# Patient Record
Sex: MALE | Race: WHITE | NOT HISPANIC OR LATINO | Employment: OTHER | URBAN - METROPOLITAN AREA
[De-identification: names, ages, dates, MRNs, and addresses within clinical notes are randomized per-mention and may not be internally consistent; named-entity substitution may affect disease eponyms.]

---

## 2017-05-19 ENCOUNTER — CONVERSION ENCOUNTER (OUTPATIENT)
Dept: CARDIOLOGY | Facility: CLINIC | Age: 62
End: 2017-05-19

## 2018-05-18 ENCOUNTER — CONVERSION ENCOUNTER (OUTPATIENT)
Dept: CARDIOLOGY | Facility: CLINIC | Age: 63
End: 2018-05-18

## 2018-11-12 ENCOUNTER — CONVERSION ENCOUNTER (OUTPATIENT)
Dept: CARDIOLOGY | Facility: CLINIC | Age: 63
End: 2018-11-12

## 2018-11-13 ENCOUNTER — HOSPITAL ENCOUNTER (OUTPATIENT)
Dept: PREADMISSION TESTING | Facility: HOSPITAL | Age: 63
Discharge: HOME | End: 2018-11-13
Attending: INTERNAL MEDICINE | Admitting: INTERNAL MEDICINE

## 2018-11-13 LAB
ANION GAP SERPL CALC-SCNC: 12.9 MMOL/L (ref 10–20)
APTT BLD: 24.3 SEC (ref 24–31)
BASOPHILS # BLD AUTO: 0 10*3/UL (ref 0–0.2)
BASOPHILS NFR BLD AUTO: 1 % (ref 0–2)
BUN SERPL-MCNC: 19 MG/DL (ref 8–20)
BUN/CREAT SERPL: 21.1 (ref 6.2–20.3)
CALCIUM SERPL-MCNC: 9 MG/DL (ref 8.9–10.3)
CHLORIDE SERPL-SCNC: 99 MMOL/L (ref 101–111)
CONV CO2: 31 MMOL/L (ref 22–32)
CREAT UR-MCNC: 0.9 MG/DL (ref 0.7–1.2)
DIFFERENTIAL METHOD BLD: (no result)
EOSINOPHIL # BLD AUTO: 0.2 10*3/UL (ref 0–0.3)
EOSINOPHIL # BLD AUTO: 3 % (ref 0–3)
ERYTHROCYTE [DISTWIDTH] IN BLOOD BY AUTOMATED COUNT: 13.6 % (ref 11.5–14.5)
GLUCOSE SERPL-MCNC: 220 MG/DL (ref 65–99)
HCT VFR BLD AUTO: 37.8 % (ref 40–54)
HGB BLD-MCNC: 13.3 G/DL (ref 14–18)
INR PPP: 1
LYMPHOCYTES # BLD AUTO: 1.2 10*3/UL (ref 0.8–4.8)
LYMPHOCYTES NFR BLD AUTO: 18 % (ref 18–42)
MCH RBC QN AUTO: 29.8 PG (ref 26–32)
MCHC RBC AUTO-ENTMCNC: 35.3 G/DL (ref 32–36)
MCV RBC AUTO: 84.5 FL (ref 80–94)
MONOCYTES # BLD AUTO: 0.4 10*3/UL (ref 0.1–1.3)
MONOCYTES NFR BLD AUTO: 7 % (ref 2–11)
NEUTROPHILS # BLD AUTO: 4.8 10*3/UL (ref 2.3–8.6)
NEUTROPHILS NFR BLD AUTO: 71 % (ref 50–75)
NRBC BLD AUTO-RTO: 0 /100{WBCS}
NRBC/RBC NFR BLD MANUAL: 0 10*3/UL
PLATELET # BLD AUTO: 248 10*3/UL (ref 150–450)
PMV BLD AUTO: 8 FL (ref 7.4–10.4)
POTASSIUM SERPL-SCNC: 3.9 MMOL/L (ref 3.6–5.1)
PROTHROMBIN TIME: 10.2 SEC (ref 9.6–11.7)
RBC # BLD AUTO: 4.48 10*6/UL (ref 4.6–6)
SODIUM SERPL-SCNC: 139 MMOL/L (ref 136–144)
WBC # BLD AUTO: 6.7 10*3/UL (ref 4.5–11.5)

## 2018-11-21 ENCOUNTER — HOSPITAL ENCOUNTER (OUTPATIENT)
Dept: LAB | Facility: HOSPITAL | Age: 63
Discharge: HOME | End: 2018-11-21
Attending: NURSE PRACTITIONER | Admitting: NURSE PRACTITIONER

## 2018-11-21 LAB
ANION GAP SERPL CALC-SCNC: 11.2 MMOL/L (ref 10–20)
BUN SERPL-MCNC: 15 MG/DL (ref 8–20)
BUN/CREAT SERPL: 16.7 (ref 6.2–20.3)
CALCIUM SERPL-MCNC: 9.4 MG/DL (ref 8.9–10.3)
CHLORIDE SERPL-SCNC: 100 MMOL/L (ref 101–111)
CONV CO2: 29 MMOL/L (ref 22–32)
CREAT UR-MCNC: 0.9 MG/DL (ref 0.7–1.2)
GLUCOSE SERPL-MCNC: 202 MG/DL (ref 65–99)
POTASSIUM SERPL-SCNC: 4.2 MMOL/L (ref 3.6–5.1)
SODIUM SERPL-SCNC: 136 MMOL/L (ref 136–144)

## 2018-11-27 ENCOUNTER — CONVERSION ENCOUNTER (OUTPATIENT)
Dept: CARDIOLOGY | Facility: CLINIC | Age: 63
End: 2018-11-27

## 2019-06-04 VITALS
BODY MASS INDEX: 39.68 KG/M2 | WEIGHT: 258 LBS | SYSTOLIC BLOOD PRESSURE: 132 MMHG | WEIGHT: 257 LBS | DIASTOLIC BLOOD PRESSURE: 74 MMHG | SYSTOLIC BLOOD PRESSURE: 118 MMHG | WEIGHT: 261 LBS | HEART RATE: 71 BPM | DIASTOLIC BLOOD PRESSURE: 72 MMHG | BODY MASS INDEX: 38.77 KG/M2 | WEIGHT: 255 LBS | HEART RATE: 68 BPM | BODY MASS INDEX: 39.23 KG/M2 | SYSTOLIC BLOOD PRESSURE: 142 MMHG | BODY MASS INDEX: 39.08 KG/M2 | DIASTOLIC BLOOD PRESSURE: 76 MMHG | HEART RATE: 67 BPM | DIASTOLIC BLOOD PRESSURE: 80 MMHG | SYSTOLIC BLOOD PRESSURE: 142 MMHG

## 2019-11-19 PROBLEM — I10 HYPERTENSION: Status: ACTIVE | Noted: 2019-11-19

## 2019-11-19 PROBLEM — E11.9 TYPE 2 DIABETES MELLITUS WITHOUT COMPLICATIONS: Status: ACTIVE | Noted: 2019-11-19

## 2019-11-19 PROBLEM — E78.5 HYPERLIPIDEMIA: Status: ACTIVE | Noted: 2019-11-19

## 2019-11-19 PROBLEM — E78.2 MIXED HYPERLIPIDEMIA: Status: ACTIVE | Noted: 2019-11-19

## 2021-04-30 ENCOUNTER — OFFICE VISIT (OUTPATIENT)
Dept: CARDIOLOGY | Facility: CLINIC | Age: 66
End: 2021-04-30

## 2021-04-30 VITALS
DIASTOLIC BLOOD PRESSURE: 90 MMHG | OXYGEN SATURATION: 98 % | WEIGHT: 255 LBS | BODY MASS INDEX: 38.77 KG/M2 | SYSTOLIC BLOOD PRESSURE: 148 MMHG | HEART RATE: 74 BPM

## 2021-04-30 DIAGNOSIS — I10 ESSENTIAL HYPERTENSION: ICD-10-CM

## 2021-04-30 DIAGNOSIS — I25.10 CORONARY ARTERY DISEASE INVOLVING NATIVE CORONARY ARTERY OF NATIVE HEART WITHOUT ANGINA PECTORIS: ICD-10-CM

## 2021-04-30 DIAGNOSIS — Z01.818 PRE-OPERATIVE CLEARANCE: Primary | ICD-10-CM

## 2021-04-30 PROCEDURE — 99204 OFFICE O/P NEW MOD 45 MIN: CPT | Performed by: INTERNAL MEDICINE

## 2021-04-30 PROCEDURE — 93000 ELECTROCARDIOGRAM COMPLETE: CPT | Performed by: INTERNAL MEDICINE

## 2021-04-30 RX ORDER — METFORMIN HYDROCHLORIDE 500 MG/1
500 TABLET, EXTENDED RELEASE ORAL DAILY
COMMUNITY
Start: 2021-02-19 | End: 2023-03-27 | Stop reason: ALTCHOICE

## 2021-04-30 RX ORDER — METOPROLOL SUCCINATE 50 MG/1
50 TABLET, EXTENDED RELEASE ORAL DAILY
COMMUNITY
Start: 2021-02-21 | End: 2021-04-30 | Stop reason: ALTCHOICE

## 2021-04-30 RX ORDER — TERAZOSIN 5 MG/1
5 CAPSULE ORAL
COMMUNITY
Start: 2021-03-17 | End: 2023-03-27

## 2021-06-28 RX ORDER — METOPROLOL SUCCINATE 50 MG/1
50 TABLET, EXTENDED RELEASE ORAL DAILY
COMMUNITY
Start: 2021-05-18 | End: 2023-03-27

## 2021-06-28 RX ORDER — NITROGLYCERIN 0.4 MG/1
TABLET SUBLINGUAL
Qty: 25 TABLET | Refills: 11 | Status: SHIPPED | OUTPATIENT
Start: 2021-06-28

## 2021-06-30 ENCOUNTER — TELEPHONE (OUTPATIENT)
Dept: CARDIOLOGY | Facility: CLINIC | Age: 66
End: 2021-06-30

## 2021-06-30 NOTE — TELEPHONE ENCOUNTER
Patient called and stated he was going to postpone having his stress myoview done because of the things that were going on with his wife. I made sure to let him know based on the symptoms that he was having that it was important that he have this test done. The patient was insistent on waiting. Patient made aware of the risks.

## 2021-09-13 ENCOUNTER — HOSPITAL ENCOUNTER (OUTPATIENT)
Dept: NUCLEAR MEDICINE | Facility: HOSPITAL | Age: 66
Discharge: HOME | End: 2021-09-13

## 2021-09-13 DIAGNOSIS — I25.10 CORONARY ARTERY DISEASE INVOLVING NATIVE CORONARY ARTERY OF NATIVE HEART WITHOUT ANGINA PECTORIS: ICD-10-CM

## 2021-09-13 DIAGNOSIS — Z01.818 PRE-OPERATIVE CLEARANCE: ICD-10-CM

## 2021-09-13 DIAGNOSIS — I10 ESSENTIAL HYPERTENSION: ICD-10-CM

## 2021-09-13 LAB
BH CV NUCLEAR PRIOR STUDY: 3
BH CV REST NUCLEAR ISOTOPE DOSE: 7.8 MCI
BH CV STRESS BP STAGE 1: NORMAL
BH CV STRESS BP STAGE 2: NORMAL
BH CV STRESS BP STAGE 3: NORMAL
BH CV STRESS BP STAGE 4: NORMAL
BH CV STRESS COMMENTS STAGE 1: NORMAL
BH CV STRESS COMMENTS STAGE 2: NORMAL
BH CV STRESS DOSE REGADENOSON STAGE 1: 0.4
BH CV STRESS DURATION MIN STAGE 1: 0
BH CV STRESS DURATION MIN STAGE 2: 4
BH CV STRESS DURATION SEC STAGE 1: 10
BH CV STRESS DURATION SEC STAGE 2: 0
BH CV STRESS HR STAGE 1: 96
BH CV STRESS HR STAGE 2: 91
BH CV STRESS HR STAGE 3: 86
BH CV STRESS HR STAGE 4: 84
BH CV STRESS NUCLEAR ISOTOPE DOSE: 21.5 MCI
BH CV STRESS PROTOCOL 1: NORMAL
BH CV STRESS RECOVERY BP: NORMAL MMHG
BH CV STRESS RECOVERY HR: 78 BPM
BH CV STRESS STAGE 1: 1
BH CV STRESS STAGE 2: 2
BH CV STRESS STAGE 3: 3
BH CV STRESS STAGE 4: 4
LV EF NUC BP: 62 %
MAXIMAL PREDICTED HEART RATE: 153 BPM
PERCENT MAX PREDICTED HR: 54.9 %
STRESS BASELINE BP: NORMAL MMHG
STRESS BASELINE HR: 69 BPM
STRESS PERCENT HR: 65 %
STRESS POST PEAK BP: NORMAL MMHG
STRESS POST PEAK HR: 84 BPM
STRESS TARGET HR: 130 BPM

## 2021-09-13 PROCEDURE — 93017 CV STRESS TEST TRACING ONLY: CPT

## 2021-09-13 PROCEDURE — A9502 TC99M TETROFOSMIN: HCPCS | Performed by: INTERNAL MEDICINE

## 2021-09-13 PROCEDURE — 78452 HT MUSCLE IMAGE SPECT MULT: CPT | Performed by: INTERNAL MEDICINE

## 2021-09-13 PROCEDURE — 78452 HT MUSCLE IMAGE SPECT MULT: CPT

## 2021-09-13 PROCEDURE — 0 TECHNETIUM TETROFOSMIN KIT: Performed by: INTERNAL MEDICINE

## 2021-09-13 PROCEDURE — 25010000002 REGADENOSON 0.4 MG/5ML SOLUTION: Performed by: INTERNAL MEDICINE

## 2021-09-13 PROCEDURE — 93016 CV STRESS TEST SUPVJ ONLY: CPT | Performed by: NURSE PRACTITIONER

## 2021-09-13 PROCEDURE — 93018 CV STRESS TEST I&R ONLY: CPT | Performed by: INTERNAL MEDICINE

## 2021-09-13 RX ADMIN — TETROFOSMIN 1 DOSE: 1.38 INJECTION, POWDER, LYOPHILIZED, FOR SOLUTION INTRAVENOUS at 10:39

## 2021-09-13 RX ADMIN — REGADENOSON 0.4 MG: 0.08 INJECTION, SOLUTION INTRAVENOUS at 10:39

## 2021-09-13 RX ADMIN — TETROFOSMIN 1 DOSE: 1.38 INJECTION, POWDER, LYOPHILIZED, FOR SOLUTION INTRAVENOUS at 08:45

## 2022-03-10 ENCOUNTER — OFFICE VISIT (OUTPATIENT)
Dept: CARDIOLOGY | Facility: CLINIC | Age: 67
End: 2022-03-10

## 2022-03-10 VITALS
OXYGEN SATURATION: 97 % | DIASTOLIC BLOOD PRESSURE: 79 MMHG | WEIGHT: 253 LBS | SYSTOLIC BLOOD PRESSURE: 146 MMHG | BODY MASS INDEX: 38.34 KG/M2 | HEIGHT: 68 IN | HEART RATE: 82 BPM

## 2022-03-10 DIAGNOSIS — I10 ESSENTIAL HYPERTENSION: ICD-10-CM

## 2022-03-10 DIAGNOSIS — I25.10 CORONARY ARTERY DISEASE INVOLVING NATIVE CORONARY ARTERY OF NATIVE HEART WITHOUT ANGINA PECTORIS: ICD-10-CM

## 2022-03-10 DIAGNOSIS — E78.2 MIXED HYPERLIPIDEMIA: ICD-10-CM

## 2022-03-10 DIAGNOSIS — Z01.818 PRE-OPERATIVE CLEARANCE: ICD-10-CM

## 2022-03-10 DIAGNOSIS — J38.1 VOCAL CORD POLYPS: Primary | ICD-10-CM

## 2022-03-10 PROCEDURE — 93000 ELECTROCARDIOGRAM COMPLETE: CPT | Performed by: NURSE PRACTITIONER

## 2022-03-10 PROCEDURE — 99214 OFFICE O/P EST MOD 30 MIN: CPT | Performed by: NURSE PRACTITIONER

## 2022-03-13 PROBLEM — Z01.810 PREOP CARDIOVASCULAR EXAM: Status: ACTIVE | Noted: 2022-03-13

## 2022-03-13 NOTE — PROGRESS NOTES
Cardiology Office Follow Up Visit      Primary Care Provider:  Ta Islas MD    Reason for f/u:     Preoperative cardiovascular risk assessment requested  Vocal polyps  CAD  Hypertension  Dyslipidemia      Subjective     CC:    Denies chest pain or dyspnea    History of Present Illness       Codey Rhodes is a 67 y.o. male.  Patient is here today requesting preoperative cardiovascular risk assessment prior to upcoming excision of vocal cord nodules planned by Dr. Durand.    The patient is known to have coronary artery disease with previous PCI and 2006, 2016 and 2018.  Additional past medical history is significant for hypertension, dyslipidemia and diabetes.    In September 2020 when the patient underwent a stress Myoview which showed no reversible ischemia.    At this time he denies any current chest pain or shortness of breath.  He denies any change in his functional activity status.  He has had no PND, orthopnea, palpitations, near syncope, lower extremity edema or feelings of his heart racing.  He reports compliance with medical therapy.          ASSESSMENT/PLAN:      Diagnoses and all orders for this visit:    1. Vocal cord polyps (Primary)    2. Pre-operative clearance  Comments:  Preoperative cardiovascular risk assessment requested prior to removal of vocal polyps    3. Essential hypertension  Comments:  Stable on current medical therapy    4. Mixed hyperlipidemia  Comments:  Treated with statin therapy    5. Coronary artery disease involving native coronary artery of native heart without angina pectoris  Comments:  And O'sNo signs or symptoms to suggest unstable angina.  Previous PCI 6, 2016 and 2018.  Negative stress Myoview September 2021            MEDICAL DECISION MAKING:      Patient is seeking preoperative cardiovascular risk assessment prior to upper upcoming vocal cord surgery.  The patient does not have any current signs or symptoms to suggest unstable angina, recent MI or heart failure.   EKG shows no acute ST or T wave segment abnormalities.  The patient has had a noninvasive ischemic evaluation within the last year that showed no reversible ischemia.    At this time the patient is an acceptable risk to proceed with upcoming surgery as planned.  He will follow up in the future with Dr. Cruz in our [place] clinic for convenience.  If he develops any new or worsening problems of asked him to contact the office sooner      Past Medical History:   Diagnosis Date   • Coronary artery disease involving native coronary artery of native heart 2016    PCI  PCI RCA 16 PCI to LAD 2018   • Hypertension    • Mixed hyperlipidemia 2019   • Type 2 diabetes mellitus without complications (HCC) 2019       Past Surgical History:   Procedure Laterality Date   • CARDIAC CATHETERIZATION     • CORONARY STENT PLACEMENT           Current Outpatient Medications:   •  amLODIPine (NORVASC) 10 MG tablet, Daily., Disp: , Rfl:   •  aspirin 325 MG tablet, Daily., Disp: , Rfl:   •  chlorthalidone (HYGROTON) 25 MG tablet, Take 12.5 mg by mouth Daily., Disp: , Rfl:   •  losartan (COZAAR) 100 MG tablet, LOSARTAN POTASSIUM 100 MG TABS, Disp: , Rfl:   •  metFORMIN ER (GLUCOPHAGE-XR) 500 MG 24 hr tablet, Take 500 mg by mouth Daily., Disp: , Rfl:   •  metoprolol succinate XL (TOPROL-XL) 50 MG 24 hr tablet, Take 50 mg by mouth Daily., Disp: , Rfl:   •  nitroglycerin (NITROSTAT) 0.4 MG SL tablet, 1 under the tongue as needed for angina, may repeat q5mins for up three doses, Disp: 25 tablet, Rfl: 11  •  pravastatin (PRAVACHOL) 40 MG tablet, Daily., Disp: , Rfl:   •  terazosin (HYTRIN) 5 MG capsule, Take 5 mg by mouth every night at bedtime., Disp: , Rfl:     Social History     Socioeconomic History   • Marital status:    Tobacco Use   • Smoking status: Former Smoker     Quit date:      Years since quittin.2   • Smokeless tobacco: Never Used   Vaping Use   • Vaping Use: Never used   Substance  "and Sexual Activity   • Alcohol use: Not Currently   • Drug use: Never   • Sexual activity: Defer       Family History   Problem Relation Age of Onset   • Heart disease Mother    • COPD Mother    • Cancer Mother    • No Known Problems Father        The following portions of the patient's history were reviewed and updated as appropriate: allergies, current medications, past family history, past medical history, past social history, past surgical history and problem list.    Review of Systems   Constitutional: Negative for decreased appetite and diaphoresis.   HENT: Positive for hoarse voice. Negative for congestion, hearing loss and nosebleeds.    Cardiovascular: Negative for chest pain, claudication, dyspnea on exertion, irregular heartbeat, leg swelling, near-syncope, orthopnea, palpitations, paroxysmal nocturnal dyspnea and syncope.   Respiratory: Negative for cough, shortness of breath and sleep disturbances due to breathing.    Endocrine: Negative for polyuria.   Hematologic/Lymphatic: Does not bruise/bleed easily.   Skin: Negative for itching and rash.   Musculoskeletal: Negative for back pain, muscle weakness and myalgias.   Gastrointestinal: Negative for abdominal pain, change in bowel habit and nausea.   Genitourinary: Negative for dysuria, flank pain, frequency and hesitancy.   Neurological: Negative for dizziness, tremors and weakness.   Psychiatric/Behavioral: Negative for altered mental status. The patient does not have insomnia.      /79   Pulse 82   Ht 172.7 cm (67.99\")   Wt 115 kg (253 lb)   SpO2 97%   BMI 38.48 kg/m² .  Objective     Vitals reviewed.   Constitutional:       General: Not in acute distress.     Appearance: Normal appearance. Well-developed.   Eyes:      Pupils: Pupils are equal, round, and reactive to light.   HENT:      Head: Normocephalic and atraumatic.   Neck:      Vascular: No JVD.   Pulmonary:      Effort: Pulmonary effort is normal.      Breath sounds: Normal breath " sounds.   Cardiovascular:      Normal rate. Regular rhythm.   Pulses:     Intact distal pulses.   Edema:     Peripheral edema absent.   Abdominal:      General: There is no distension.      Palpations: Abdomen is soft.      Tenderness: There is no abdominal tenderness.   Musculoskeletal: Normal range of motion.      Cervical back: Normal range of motion and neck supple. Skin:     General: Skin is warm and dry.   Neurological:      Mental Status: Alert and oriented to person, place, and time.             ECG 12 Lead    Date/Time: 3/10/2022 11:05 AM  Performed by: Bhavna Abdi APRN  Authorized by: Bhavna Abdi APRN   Comparison: not compared with previous ECG   Rhythm: sinus rhythm  Rate: normal  BPM: 82  Conduction: conduction normal  ST Segments: ST segments normal  T Waves: T waves normal  QRS axis: normal  Other: no other findings    Clinical impression: normal ECG            EKG ordered by and reviewed by me in office

## 2023-03-09 ENCOUNTER — OFFICE VISIT (OUTPATIENT)
Dept: CARDIOLOGY | Facility: CLINIC | Age: 68
End: 2023-03-09
Payer: MEDICARE

## 2023-03-09 VITALS
WEIGHT: 244 LBS | DIASTOLIC BLOOD PRESSURE: 79 MMHG | SYSTOLIC BLOOD PRESSURE: 144 MMHG | RESPIRATION RATE: 18 BRPM | HEART RATE: 60 BPM | BODY MASS INDEX: 36.98 KG/M2 | HEIGHT: 68 IN

## 2023-03-09 DIAGNOSIS — I25.118 CORONARY ARTERY DISEASE OF NATIVE ARTERY OF NATIVE HEART WITH STABLE ANGINA PECTORIS: Primary | ICD-10-CM

## 2023-03-09 PROCEDURE — 3077F SYST BP >= 140 MM HG: CPT | Performed by: INTERNAL MEDICINE

## 2023-03-09 PROCEDURE — 99214 OFFICE O/P EST MOD 30 MIN: CPT | Performed by: INTERNAL MEDICINE

## 2023-03-09 PROCEDURE — 3078F DIAST BP <80 MM HG: CPT | Performed by: INTERNAL MEDICINE

## 2023-03-09 PROCEDURE — 93000 ELECTROCARDIOGRAM COMPLETE: CPT | Performed by: INTERNAL MEDICINE

## 2023-03-17 NOTE — PROGRESS NOTES
"Cardiology Clinic Note  Zak Cantu MD, PhD    Subjective:     Encounter Date:03/09/2023      Patient ID: Codey Rhodes is a 68 y.o. male.    Chief Complaint:  Chief Complaint   Patient presents with   • Follow-up       HPI:    I had the pleasure to see this very pleasant 67-year-old gentleman previously followed by cardiology with CAD, prior PCI remotely 2006 2016 and 2018, essential hypertension hyperlipidemia and diabetes.  Stress Myoview 2020 revealed no reversible ischemia with preserved EF.  He has had occasional palpitations, underwent preoperative clearance with stress testing which was also unremarkable.  His blood pressures are controlled at home less than 135 systolic with heart rates in the 60s, his weight is down to 244 from 253, he displays no chest pain, unstable angina, EKG demonstrates sinus rhythm with normal conduction at a rate of 60.  No heart failure signs or symptoms no unstable angina no unexplained palpitations or presyncope    Review of systems otherwise negative x14 point review of systems except as mentioned above    History reviewed  PCI 2006 to the RCA, 2016 LAD    Historical data copied forward from previous encounters in EMR including the history, exam, and assessment/plan has been reviewed and is unchanged unless noted otherwise.    Cardiac medicines reviewed with risk, benefits, and necessity of each discussed.    Risk and benefit of cardiac testing reviewed including death heart attack stroke pain bleeding infection need for vascular /cardiovascular surgery were discussed and the patient     Objective:         /79 (BP Location: Left arm, Patient Position: Sitting)   Pulse 60   Resp 18   Ht 172.7 cm (68\")   Wt 111 kg (244 lb)   BMI 37.10 kg/m²     Physical Exam  Regular rate and rhythm with no rubs gallops heave or lift  Faint 1 out of 6 murmur benign  No clubbing cyanosis or edema  Overweight, BMI greater than 35, soft nontender nondistended  Intact grossly  Clear to " auscultation  Normocephalic/atraumatic pupils equal round  Normal pulses normal cap refill  No carotid bruits or JVD  Assessment:       Essential hypertension  Hyperlipidemia  Diabetes is comorbidity  Obesity BMI greater than 35  Multivessel CAD, RCA and LAD with interventions 2016 2016 respectively  Secondary prevention goals discussed  Goal LDL is less than 70 with CAD and diabetes  Goal blood pressure really less than 130 systolic and is in the 120s occasionally at home but also in the 130s,  Continue losartan chlorthalidone and amlodipine and metoprolol extended release in combination with aspirin and statin    EKG is okay today  No complaints    Continue current secondary prevention goals on appropriate medicines as above  See him back in 6 months to year with stability of symptoms and stable CAD since 2018    Encourage diet and exercise, weight loss caloric restriction low-cholesterol low-sodium diet      The pleasure to be involved in this patient's cardiovascular care.  Please call with any questions or concerns  Zak Cantu MD, PhD    Most recent EKG as reviewed and interpreted by me:    ECG 12 Lead    Date/Time: 3/9/2023 8:52 AM  Performed by: Zak Cantu MD  Authorized by: Zak Cantu MD   Comparison: not compared with previous ECG   Previous ECG: no previous ECG available  Rate: normal  Conduction: conduction normal  QRS axis: normal    Clinical impression: normal ECG             Most recent echo as reviewed and interpreted by me:      Most recent stress test as reviewed and interpreted by me:  Results for orders placed during the hospital encounter of 09/13/21    Stress Test With Myocardial Perfusion One Day    Interpretation Summary  · Left ventricular ejection fraction is normal. (Calculated EF = 62%).  · Myocardial perfusion imaging indicates a normal myocardial perfusion study with no evidence of ischemia.  · Impressions are consistent with a low risk study.  · There is  no prior study available for comparison. Resting images had a fixed defect in the inferoapical wall, summed rest score of 9 in the inferoseptum and apical inferior walls mainly Summed rest score improved in stress, summed stress score of only 4, with improvement of perfusion at the apex, no reversibility seen EF 62% with normal size ventricle.  · Findings consistent with a normal ECG stress test.    Low risk study  No significant reversibility seen  EF 60%  Resting abnormalities in the inferoseptal mid and apical inferior wall improved with stress imaging  No arrhythmias seen  Normal stress ECG, no ischemic changes at maximal stress or recovery  No arrhythmias seen    Low risk study for significant burden of reversible ischemia      Most recent cardiac catheterization as reviewed interpreted by me:  No results found for this or any previous visit.    The following portions of the patient's history were reviewed and updated as appropriate: allergies, current medications, past family history, past medical history, past social history, past surgical history and problem list.      ROS:  14 point review of systems negative except as mentioned above    Current Outpatient Medications:   •  amLODIPine (NORVASC) 10 MG tablet, Daily., Disp: , Rfl:   •  aspirin 325 MG tablet, Daily., Disp: , Rfl:   •  chlorthalidone (HYGROTON) 25 MG tablet, Take 12.5 mg by mouth Daily., Disp: , Rfl:   •  losartan (COZAAR) 100 MG tablet, LOSARTAN POTASSIUM 100 MG TABS, Disp: , Rfl:   •  metFORMIN ER (GLUCOPHAGE-XR) 500 MG 24 hr tablet, Take 1 tablet by mouth Daily., Disp: , Rfl:   •  metoprolol succinate XL (TOPROL-XL) 50 MG 24 hr tablet, Take 1 tablet by mouth Daily., Disp: , Rfl:   •  nitroglycerin (NITROSTAT) 0.4 MG SL tablet, 1 under the tongue as needed for angina, may repeat q5mins for up three doses, Disp: 25 tablet, Rfl: 11  •  pravastatin (PRAVACHOL) 40 MG tablet, Daily., Disp: , Rfl:   •  terazosin (HYTRIN) 5 MG capsule, Take 1  capsule by mouth every night at bedtime., Disp: , Rfl:     Problem List:  Patient Active Problem List   Diagnosis   • Coronary artery disease involving native coronary artery of native heart   • Mixed hyperlipidemia   • Essential hypertension   • Type 2 diabetes mellitus without complications (HCC)   • Pre-operative clearance   • Vocal cord polyps   • S/P coronary artery stent placement   • S/P blane   • Borderline diabetes   • Preop cardiovascular exam     Past Medical History:  Past Medical History:   Diagnosis Date   • Coronary artery disease involving native coronary artery of native heart 2016    PCI  PCI RCA 16 PCI to LAD 2018   • Hypertension    • Mixed hyperlipidemia 2019   • Type 2 diabetes mellitus without complications (HCC) 2019     Past Surgical History:  Past Surgical History:   Procedure Laterality Date   • CARDIAC CATHETERIZATION     • CORONARY STENT PLACEMENT       Social History:  Social History     Socioeconomic History   • Marital status:    Tobacco Use   • Smoking status: Former     Types: Cigarettes     Quit date:      Years since quittin.2   • Smokeless tobacco: Never   Vaping Use   • Vaping Use: Never used   Substance and Sexual Activity   • Alcohol use: Not Currently   • Drug use: Never   • Sexual activity: Defer     Allergies:  Allergies   Allergen Reactions   • Crestor [Rosuvastatin] Myalgia     Immunizations:  Immunization History   Administered Date(s) Administered   • COVID-19 (PFIZER) PURPLE CAP 2021, 2021, 2021   • Covid-19 (Pfizer) Gray Cap 2022   • Influenza Split Preservative Free ID 2012   • Pneumococcal Polysaccharide (PPSV23) 2013, 2020            In-Office Procedure(s):  No orders to display        ASCVD RIsk Score::  The ASCVD Risk score (Mario DK, et al., 2019) failed to calculate for the following reasons:    Cannot find a previous HDL lab    Cannot find a previous total  cholesterol lab    Imaging:                 Lab Review:   No visits with results within 6 Month(s) from this visit.   Latest known visit with results is:   Hospital Outpatient Visit on 09/13/2021   Component Date Value   • Target HR (85%) 09/13/2021 130    • Max. Pred. HR (100%) 09/13/2021 153    • BH CV STRESS PROTOCOL 1 09/13/2021 Pharmacologic    • Stage 1 09/13/2021 1    • HR Stage 1 09/13/2021 96    • BP Stage 1 09/13/2021 146/66    • Duration Min Stage 1 09/13/2021 0    • Duration Sec Stage 1 09/13/2021 10    • Stress Dose Regadenoson * 09/13/2021 0.4    • Stress Comments Stage 1 09/13/2021 10 sec bolus injection    • Stage 2 09/13/2021 2    • HR Stage 2 09/13/2021 91    • BP Stage 2 09/13/2021 138/71    • Duration Min Stage 2 09/13/2021 4    • Duration Sec Stage 2 09/13/2021 0    • Stress Comments Stage 2 09/13/2021 recovery    • Stage 3 09/13/2021 3    • HR Stage 3 09/13/2021 86    • BP Stage 3 09/13/2021 138/71    • Stage 4 09/13/2021 4    • HR Stage 4 09/13/2021 84    • BP Stage 4 09/13/2021 141/82    • Baseline HR 09/13/2021 69    • Baseline BP 09/13/2021 146/66    • Peak HR 09/13/2021 84    • Percent Max Pred HR 09/13/2021 54.90    • Percent Target HR 09/13/2021 65    • Peak BP 09/13/2021 141/82    • Recovery HR 09/13/2021 78    • Recovery BP 09/13/2021 139/56    • Nuclear Prior Study 09/13/2021 3    • BH CV REST NUCLEAR ISOTO* 09/13/2021 7.8    • BH CV STRESS NUCLEAR ISO* 09/13/2021 21.5    • Nuc Stress EF 09/13/2021 62      Recent labs reviewed and interpreted for clinical significance and application            Level of Care:           Zak Cantu MD  03/17/23  .

## 2023-03-27 ENCOUNTER — APPOINTMENT (OUTPATIENT)
Dept: CT IMAGING | Facility: HOSPITAL | Age: 68
End: 2023-03-27
Payer: MEDICARE

## 2023-03-27 ENCOUNTER — HOSPITAL ENCOUNTER (EMERGENCY)
Facility: HOSPITAL | Age: 68
Discharge: HOME | End: 2023-03-28
Attending: EMERGENCY MEDICINE | Admitting: EMERGENCY MEDICINE
Payer: MEDICARE

## 2023-03-27 DIAGNOSIS — R10.31 RLQ ABDOMINAL PAIN: ICD-10-CM

## 2023-03-27 DIAGNOSIS — N39.0 URINARY TRACT INFECTION WITHOUT HEMATURIA, SITE UNSPECIFIED: Primary | ICD-10-CM

## 2023-03-27 DIAGNOSIS — N21.0 BLADDER STONE: ICD-10-CM

## 2023-03-27 LAB
ALBUMIN SERPL-MCNC: 4.4 G/DL (ref 3.5–5.2)
ALBUMIN/GLOB SERPL: 1.8 G/DL
ALP SERPL-CCNC: 61 U/L (ref 39–117)
ALT SERPL W P-5'-P-CCNC: 21 U/L (ref 1–41)
ANION GAP SERPL CALCULATED.3IONS-SCNC: 9 MMOL/L (ref 5–15)
AST SERPL-CCNC: 16 U/L (ref 1–40)
BACTERIA UR QL AUTO: ABNORMAL /HPF
BASOPHILS # BLD AUTO: 0.1 10*3/MM3 (ref 0–0.2)
BASOPHILS NFR BLD AUTO: 1.2 % (ref 0–1.5)
BILIRUB SERPL-MCNC: 0.2 MG/DL (ref 0–1.2)
BILIRUB UR QL STRIP: NEGATIVE
BUN SERPL-MCNC: 20 MG/DL (ref 8–23)
BUN/CREAT SERPL: 23.5 (ref 7–25)
CALCIUM SPEC-SCNC: 9.5 MG/DL (ref 8.6–10.5)
CHLORIDE SERPL-SCNC: 103 MMOL/L (ref 98–107)
CLARITY UR: ABNORMAL
CO2 SERPL-SCNC: 31 MMOL/L (ref 22–29)
COLOR UR: YELLOW
CREAT SERPL-MCNC: 0.85 MG/DL (ref 0.76–1.27)
DEPRECATED RDW RBC AUTO: 43.3 FL (ref 37–54)
EGFRCR SERPLBLD CKD-EPI 2021: 94.1 ML/MIN/1.73
EOSINOPHIL # BLD AUTO: 0.2 10*3/MM3 (ref 0–0.4)
EOSINOPHIL NFR BLD AUTO: 3.8 % (ref 0.3–6.2)
ERYTHROCYTE [DISTWIDTH] IN BLOOD BY AUTOMATED COUNT: 14.2 % (ref 12.3–15.4)
GLOBULIN UR ELPH-MCNC: 2.5 GM/DL
GLUCOSE SERPL-MCNC: 177 MG/DL (ref 65–99)
GLUCOSE UR STRIP-MCNC: ABNORMAL MG/DL
HCT VFR BLD AUTO: 38.5 % (ref 37.5–51)
HGB BLD-MCNC: 13.6 G/DL (ref 13–17.7)
HGB UR QL STRIP.AUTO: NEGATIVE
HYALINE CASTS UR QL AUTO: ABNORMAL /LPF
KETONES UR QL STRIP: NEGATIVE
LEUKOCYTE ESTERASE UR QL STRIP.AUTO: ABNORMAL
LIPASE SERPL-CCNC: 43 U/L (ref 13–60)
LYMPHOCYTES # BLD AUTO: 1.5 10*3/MM3 (ref 0.7–3.1)
LYMPHOCYTES NFR BLD AUTO: 24.3 % (ref 19.6–45.3)
MCH RBC QN AUTO: 29.3 PG (ref 26.6–33)
MCHC RBC AUTO-ENTMCNC: 35.2 G/DL (ref 31.5–35.7)
MCV RBC AUTO: 83.2 FL (ref 79–97)
MONOCYTES # BLD AUTO: 0.5 10*3/MM3 (ref 0.1–0.9)
MONOCYTES NFR BLD AUTO: 8.2 % (ref 5–12)
NEUTROPHILS NFR BLD AUTO: 3.9 10*3/MM3 (ref 1.7–7)
NEUTROPHILS NFR BLD AUTO: 62.5 % (ref 42.7–76)
NITRITE UR QL STRIP: NEGATIVE
NRBC BLD AUTO-RTO: 0 /100 WBC (ref 0–0.2)
PH UR STRIP.AUTO: 5.5 [PH] (ref 5–8)
PLATELET # BLD AUTO: 231 10*3/MM3 (ref 140–450)
PMV BLD AUTO: 7.7 FL (ref 6–12)
POTASSIUM SERPL-SCNC: 3.7 MMOL/L (ref 3.5–5.2)
PROT SERPL-MCNC: 6.9 G/DL (ref 6–8.5)
PROT UR QL STRIP: ABNORMAL
RBC # BLD AUTO: 4.63 10*6/MM3 (ref 4.14–5.8)
RBC # UR STRIP: ABNORMAL /HPF
REF LAB TEST METHOD: ABNORMAL
SODIUM SERPL-SCNC: 143 MMOL/L (ref 136–145)
SP GR UR STRIP: 1.02 (ref 1–1.03)
SQUAMOUS #/AREA URNS HPF: ABNORMAL /HPF
UNIDENT CRYS URNS QL MICRO: ABNORMAL /HPF
UROBILINOGEN UR QL STRIP: ABNORMAL
WBC # UR STRIP: ABNORMAL /HPF
WBC NRBC COR # BLD: 6.2 10*3/MM3 (ref 3.4–10.8)

## 2023-03-27 PROCEDURE — 85025 COMPLETE CBC W/AUTO DIFF WBC: CPT

## 2023-03-27 PROCEDURE — 80053 COMPREHEN METABOLIC PANEL: CPT

## 2023-03-27 PROCEDURE — 87086 URINE CULTURE/COLONY COUNT: CPT

## 2023-03-27 PROCEDURE — 83690 ASSAY OF LIPASE: CPT

## 2023-03-27 PROCEDURE — 82962 GLUCOSE BLOOD TEST: CPT

## 2023-03-27 PROCEDURE — 74176 CT ABD & PELVIS W/O CONTRAST: CPT

## 2023-03-27 PROCEDURE — 87077 CULTURE AEROBIC IDENTIFY: CPT

## 2023-03-27 PROCEDURE — 99284 EMERGENCY DEPT VISIT MOD MDM: CPT

## 2023-03-27 PROCEDURE — 81001 URINALYSIS AUTO W/SCOPE: CPT

## 2023-03-27 RX ORDER — ACETAMINOPHEN 500 MG
1000 TABLET ORAL ONCE
Status: COMPLETED | OUTPATIENT
Start: 2023-03-27 | End: 2023-03-28

## 2023-03-27 RX ORDER — SODIUM CHLORIDE 0.9 % (FLUSH) 0.9 %
10 SYRINGE (ML) INJECTION AS NEEDED
Status: DISCONTINUED | OUTPATIENT
Start: 2023-03-27 | End: 2023-03-28 | Stop reason: HOSPADM

## 2023-03-28 VITALS
WEIGHT: 242.51 LBS | OXYGEN SATURATION: 98 % | TEMPERATURE: 97.7 F | HEIGHT: 68 IN | SYSTOLIC BLOOD PRESSURE: 149 MMHG | RESPIRATION RATE: 18 BRPM | BODY MASS INDEX: 36.75 KG/M2 | DIASTOLIC BLOOD PRESSURE: 81 MMHG | HEART RATE: 71 BPM

## 2023-03-28 PROCEDURE — 25010000002 CEFTRIAXONE PER 250 MG: Performed by: PHYSICIAN ASSISTANT

## 2023-03-28 PROCEDURE — 96365 THER/PROPH/DIAG IV INF INIT: CPT

## 2023-03-28 RX ORDER — CEFDINIR 300 MG/1
300 CAPSULE ORAL 2 TIMES DAILY
Qty: 10 CAPSULE | Refills: 0 | Status: SHIPPED | OUTPATIENT
Start: 2023-03-28 | End: 2023-04-02

## 2023-03-28 RX ADMIN — ACETAMINOPHEN 1000 MG: 500 TABLET, FILM COATED ORAL at 00:08

## 2023-03-28 RX ADMIN — CEFTRIAXONE 2 G: 2 INJECTION, POWDER, FOR SOLUTION INTRAMUSCULAR; INTRAVENOUS at 00:08

## 2023-03-28 NOTE — ED PROVIDER NOTES
Subjective      Provider in Triage Note  Patient is a 68-year-old male presents to the ER today with right flank pain radiating into his abdomen, it is been going on for approximately 4 days he has had no nausea or vomiting no diarrhea or constipation he denies dysuria hematuria.  The pain is not exacerbated with certain movements.  Sent from urgent care.      History of Present Illness  Chief Complaint: Abdominal pain, flank pain    Patient is a 68-year-old  male with history of CAD, hypertension, diabetes presents the ER with complaints of right flank pain for 4 days.  Patient states the pain has been constant.  He describes as a constant aching throbbing pain that starts in the right mid to lower back and radiates down to the right lower quadrant.  He currently rates it a 7/10.  No nausea vomiting or diarrhea.  No hematuria hematochezia or melena.  No chest pain shortness breath headache or fever or chills.  Abdominal surgical history significant for cholecystectomy.    PCP: Ta Islas    History provided by:  Patient      Review of Systems   Constitutional: Negative for chills and fever.   HENT: Negative for sore throat and trouble swallowing.    Eyes: Negative.    Respiratory: Negative for shortness of breath and wheezing.    Cardiovascular: Negative for chest pain.   Gastrointestinal: Positive for abdominal pain. Negative for diarrhea, nausea and vomiting.   Endocrine: Negative.    Genitourinary: Positive for flank pain. Negative for dysuria.   Musculoskeletal: Negative for myalgias.   Skin: Negative for rash.   Allergic/Immunologic: Negative.    Neurological: Negative for headaches.   Psychiatric/Behavioral: Negative for behavioral problems.   All other systems reviewed and are negative.      Past Medical History:   Diagnosis Date   • Coronary artery disease involving native coronary artery of native heart 12/8/2016    PCI 12/1/12006 PCI RCA 12/14/16 PCI to LAD 11/2018   • Hypertension    •  Mixed hyperlipidemia 2019   • Type 2 diabetes mellitus without complications (HCC) 2019       Allergies   Allergen Reactions   • Crestor [Rosuvastatin] Myalgia       Past Surgical History:   Procedure Laterality Date   • CARDIAC CATHETERIZATION     • CORONARY STENT PLACEMENT         Family History   Problem Relation Age of Onset   • Heart disease Mother    • COPD Mother    • Cancer Mother    • No Known Problems Father        Social History     Socioeconomic History   • Marital status:    Tobacco Use   • Smoking status: Former     Types: Cigarettes     Quit date:      Years since quittin.2   • Smokeless tobacco: Never   Vaping Use   • Vaping Use: Never used   Substance and Sexual Activity   • Alcohol use: Not Currently   • Drug use: Never   • Sexual activity: Defer           Objective   Physical Exam  Vitals and nursing note reviewed.   Constitutional:       General: He is not in acute distress.     Appearance: He is well-developed and normal weight. He is not diaphoretic.   HENT:      Head: Normocephalic and atraumatic.      Mouth/Throat:      Mouth: Mucous membranes are moist.   Eyes:      Extraocular Movements: Extraocular movements intact.   Cardiovascular:      Rate and Rhythm: Normal rate and regular rhythm.      Heart sounds: Normal heart sounds. No murmur heard.  Pulmonary:      Effort: Pulmonary effort is normal.      Breath sounds: Normal breath sounds.   Abdominal:      General: Abdomen is flat. Bowel sounds are normal.      Palpations: Abdomen is soft.      Tenderness: There is abdominal tenderness in the right lower quadrant. There is right CVA tenderness. There is no left CVA tenderness or guarding. Negative signs include Eubanks's sign.   Skin:     General: Skin is warm.      Capillary Refill: Capillary refill takes less than 2 seconds.   Neurological:      General: No focal deficit present.      Mental Status: He is alert and oriented to person, place, and time.  "  Psychiatric:         Mood and Affect: Mood normal.         Behavior: Behavior normal.         Procedures           ED Course    /80   Pulse 69   Temp 97.7 °F (36.5 °C)   Resp 18   Ht 172.7 cm (68\")   Wt 110 kg (242 lb 8.1 oz)   SpO2 98%   BMI 36.87 kg/m²   Labs Reviewed   COMPREHENSIVE METABOLIC PANEL - Abnormal; Notable for the following components:       Result Value    Glucose 177 (*)     CO2 31.0 (*)     All other components within normal limits    Narrative:     GFR Normal >60  Chronic Kidney Disease <60  Kidney Failure <15     URINALYSIS W/ CULTURE IF INDICATED - Abnormal; Notable for the following components:    Appearance, UA Cloudy (*)     Glucose,  mg/dL (1+) (*)     Protein, UA Trace (*)     Leuk Esterase, UA Small (1+) (*)     All other components within normal limits    Narrative:     In absence of clinical symptoms, the presence of pyuria, bacteria, and/or nitrites on the urinalysis result does not correlate with infection.   URINALYSIS, MICROSCOPIC ONLY - Abnormal; Notable for the following components:    RBC, UA 0-2 (*)     WBC, UA 21-30 (*)     Bacteria, UA 3+ (*)     All other components within normal limits   LIPASE - Normal   CBC WITH AUTO DIFFERENTIAL - Normal   URINE CULTURE   CBC AND DIFFERENTIAL    Narrative:     The following orders were created for panel order CBC & Differential.  Procedure                               Abnormality         Status                     ---------                               -----------         ------                     CBC Auto Differential[[id number]]        Normal              Final result                 Please view results for these tests on the individual orders.     Medications   sodium chloride 0.9 % flush 10 mL (has no administration in time range)   cefTRIAXone (ROCEPHIN) 2 g in sodium chloride 0.9 % 100 mL IVPB (2 g Intravenous New Bag 3/28/23 0008)   acetaminophen (TYLENOL) tablet 1,000 mg (1,000 mg Oral Given 3/28/23 0008) "     CT Abdomen Pelvis Without Contrast    Result Date: 3/27/2023  1.Tiny nonobstructive stones are noted in the left kidney measuring 1 to 2 mm. There is no evidence for obstructive uropathy. 2.No evidence for acute abnormality throughout the abdomen or pelvis on this noncontrast exam. 3.A large stone is seen in the bladder measuring 3.6 cm. Electronically Signed: Valente Lyles  3/27/2023 10:08 PM EDT  Workstation ID: [id number]                                           Medical Decision Making  Differential Dx (Includes but not limited to): Appendicitis, kidney stone, obstructive uropathy, UTI, cystitis, obstruction, diverticulitis  Medical Records Reviewed: Patient seen in urgent care prior to presenting to the ER, no other pertinent records  Labs: On my interpretation urinalysis positive for UTI, 3+ bacteria.  CBC no leukocytosis.  CMP glucose 177, CO2 31.0.  Imaging: On my interpretation, no obvious obstructive uropathy.  There is a large bladder stone  Telemetry: N/A  Testing considered but not ordered: Chest x-ray, patient denies any cough congestion or URI symptoms.  Nature of Complaint: Acute  Admission vs Discharge:   Discussion: While in the ED IV was placed and labs were obtained appropriate PPE was worn during exam and throughout all encounters with the patient.  Patient had the above evaluation.  Patient offered pain medication, he declined.  Lab work unremarkable.  Urinalysis positive for UTI.  Patient given 1 g Rocephin.  CT abdomen pelvis shows nonobstructing stones in the left kidney, no obstructive uropathy.  Normal appendix.  Large bladder stone.  Patient was notified of findings.  He is aware of the bladder stone.  Patient will be treated for UTI, no indication for admission at this time.  He will be discharged with prescription for cefdinir.  Patient to follow-up with primary care for recheck.    Discharge plan and instructions were discussed with the patient who verbalized understanding and is in  agreement with the plan, all questions were answered at this time.  Patient is aware of signs symptoms that would require immediate return to the emergency room.  Patient understands importance of following up with primary care provider for further evaluation and worsening concerns as well as blood pressure recheck in the next 4 weeks.    Patient was discharged in improved stable condition with an upright steady gait.    Patient is aware that discharge does not mean that nothing is wrong but indicates no emergencies present and they must continue care with follow-up as given below or physician of his choice.    Bladder stone: acute illness or injury  RLQ abdominal pain: acute illness or injury  Urinary tract infection without hematuria, site unspecified: acute illness or injury  Amount and/or Complexity of Data Reviewed  External Data Reviewed: notes.  Labs: ordered. Decision-making details documented in ED Course.  Radiology: ordered. Decision-making details documented in ED Course.      Risk  OTC drugs.  Prescription drug management.          Final diagnoses:   Urinary tract infection without hematuria, site unspecified   RLQ abdominal pain   Bladder stone       ED Disposition  ED Disposition     ED Disposition   Discharge    Condition   Stable    Comment   --             Ta Islas MD  [address] [facility] [place] [address] [phone]    Schedule an appointment as soon as possible for a visit in 2 days  As needed, If symptoms worsen    Trent Bowden MD  [address] [place] [address] [phone]    Schedule an appointment as soon as possible for a visit in 2 days  As needed, If symptoms worsen         Medication List      New Prescriptions    cefdinir 300 MG capsule  Commonly known as: OMNICEF  Take 1 capsule by mouth 2 (Two) Times a Day for 5 days.           Where to Get Your Medications      These medications were sent to [facility]/pharmacy #[id number] - [place], [place] - [address]  - [phone] [facility] [phone] FX  [address] [facility] [place] [address]    Phone: [phone]   · cefdinir 300 MG capsule          Denise Larsen PA  03/28/23 0022

## 2023-03-28 NOTE — DISCHARGE INSTRUCTIONS
Take Tylenol as needed for pain.  Take cefdinir antibiotics to completion.    Drink plenty fluid  Follow-up with primary care for recheck    Return to the ER for new or worsening symptoms

## 2023-03-29 LAB — BACTERIA SPEC AEROBE CULT: ABNORMAL

## 2023-03-29 NOTE — PHARMACY RECOMMENDATION
Patient was seen in ED on 3/27 for flank pain radiating into the abdomen for 4 days. Patient was sent her from the urgent care. During the visit, WBC was wnl, patient was afebrile. Urinalysis showed 3+ bacteria, negative nitrites, small leukocytes, and 21-30 WBCs. Ct showed nonobstructive kidney stones. Patient urine culture resulted with  aerococcus urinae , susceptible to Penicillins and requiring higher AYO for ceftriaxone per Zepeda Guide. Spoke to the patient who stated he is feeling better and current antibiotic seem to help. Patient was given Rx for cefdinir. Case discussed with Lynette Larsen, no change in therapy needed. Therapy is appropriate coverage. No further follow-up required..    Microbiology Results (last 10 days)       Procedure Component Value - Date/Time    Urine Culture - Urine, Urine, Clean Catch [[id number]]  (Abnormal) Collected: 03/27/23 3415    Lab Status: Final result Specimen: Urine, Clean Catch Updated: 03/29/23 0842     Urine Culture >100,000 CFU/mL Aerococcus urinae     Comment:   Aerococcus spp. are usually susceptible to beta-lactams and vancomycin. Resistance has been reported to the fluoroquinolones. Routine susceptibility testing is not recommended. Please call lab to request further workup.       Narrative:      Colonization of the urinary tract without infection is common. Treatment is discouraged unless the patient is symptomatic, pregnant, or undergoing an invasive urologic procedure.            Denisha Sims, PharmD  3/29/2023 11:33 EDT

## 2023-03-30 ENCOUNTER — HOSPITAL ENCOUNTER (EMERGENCY)
Facility: HOSPITAL | Age: 68
Discharge: HOME | End: 2023-03-30
Attending: EMERGENCY MEDICINE | Admitting: EMERGENCY MEDICINE
Payer: MEDICARE

## 2023-03-30 VITALS
HEIGHT: 68 IN | WEIGHT: 242 LBS | RESPIRATION RATE: 19 BRPM | BODY MASS INDEX: 36.68 KG/M2 | DIASTOLIC BLOOD PRESSURE: 76 MMHG | TEMPERATURE: 97.2 F | SYSTOLIC BLOOD PRESSURE: 134 MMHG | HEART RATE: 69 BPM | OXYGEN SATURATION: 98 %

## 2023-03-30 DIAGNOSIS — B02.9 HERPES ZOSTER WITHOUT COMPLICATION: Primary | ICD-10-CM

## 2023-03-30 LAB
ALBUMIN SERPL-MCNC: 4.3 G/DL (ref 3.5–5.2)
ALBUMIN/GLOB SERPL: 1.7 G/DL
ALP SERPL-CCNC: 63 U/L (ref 39–117)
ALT SERPL W P-5'-P-CCNC: 19 U/L (ref 1–41)
ANION GAP SERPL CALCULATED.3IONS-SCNC: 11 MMOL/L (ref 5–15)
AST SERPL-CCNC: 15 U/L (ref 1–40)
BACTERIA UR QL AUTO: ABNORMAL /HPF
BASOPHILS # BLD AUTO: 0 10*3/MM3 (ref 0–0.2)
BASOPHILS NFR BLD AUTO: 0.7 % (ref 0–1.5)
BILIRUB SERPL-MCNC: 0.5 MG/DL (ref 0–1.2)
BILIRUB UR QL STRIP: NEGATIVE
BUN SERPL-MCNC: 13 MG/DL (ref 8–23)
BUN/CREAT SERPL: 15.9 (ref 7–25)
CALCIUM SPEC-SCNC: 9.4 MG/DL (ref 8.6–10.5)
CHLORIDE SERPL-SCNC: 103 MMOL/L (ref 98–107)
CLARITY UR: CLEAR
CO2 SERPL-SCNC: 29 MMOL/L (ref 22–29)
COLOR UR: YELLOW
CREAT SERPL-MCNC: 0.82 MG/DL (ref 0.76–1.27)
DEPRECATED RDW RBC AUTO: 43.8 FL (ref 37–54)
EGFRCR SERPLBLD CKD-EPI 2021: 95.1 ML/MIN/1.73
EOSINOPHIL # BLD AUTO: 0.1 10*3/MM3 (ref 0–0.4)
EOSINOPHIL NFR BLD AUTO: 2.3 % (ref 0.3–6.2)
ERYTHROCYTE [DISTWIDTH] IN BLOOD BY AUTOMATED COUNT: 14.2 % (ref 12.3–15.4)
GLOBULIN UR ELPH-MCNC: 2.6 GM/DL
GLUCOSE SERPL-MCNC: 165 MG/DL (ref 65–99)
GLUCOSE UR STRIP-MCNC: NEGATIVE MG/DL
HCT VFR BLD AUTO: 39.8 % (ref 37.5–51)
HGB BLD-MCNC: 13.8 G/DL (ref 13–17.7)
HGB UR QL STRIP.AUTO: NEGATIVE
HYALINE CASTS UR QL AUTO: ABNORMAL /LPF
KETONES UR QL STRIP: ABNORMAL
LEUKOCYTE ESTERASE UR QL STRIP.AUTO: ABNORMAL
LIPASE SERPL-CCNC: 30 U/L (ref 13–60)
LYMPHOCYTES # BLD AUTO: 1.1 10*3/MM3 (ref 0.7–3.1)
LYMPHOCYTES NFR BLD AUTO: 18.1 % (ref 19.6–45.3)
MCH RBC QN AUTO: 28.8 PG (ref 26.6–33)
MCHC RBC AUTO-ENTMCNC: 34.5 G/DL (ref 31.5–35.7)
MCV RBC AUTO: 83.4 FL (ref 79–97)
MONOCYTES # BLD AUTO: 0.3 10*3/MM3 (ref 0.1–0.9)
MONOCYTES NFR BLD AUTO: 5.3 % (ref 5–12)
NEUTROPHILS NFR BLD AUTO: 4.5 10*3/MM3 (ref 1.7–7)
NEUTROPHILS NFR BLD AUTO: 73.6 % (ref 42.7–76)
NITRITE UR QL STRIP: NEGATIVE
NRBC BLD AUTO-RTO: 0.2 /100 WBC (ref 0–0.2)
PH UR STRIP.AUTO: 5.5 [PH] (ref 5–8)
PLATELET # BLD AUTO: 223 10*3/MM3 (ref 140–450)
PMV BLD AUTO: 7.5 FL (ref 6–12)
POTASSIUM SERPL-SCNC: 3.7 MMOL/L (ref 3.5–5.2)
PROT SERPL-MCNC: 6.9 G/DL (ref 6–8.5)
PROT UR QL STRIP: ABNORMAL
RBC # BLD AUTO: 4.77 10*6/MM3 (ref 4.14–5.8)
RBC # UR STRIP: ABNORMAL /HPF
REF LAB TEST METHOD: ABNORMAL
SODIUM SERPL-SCNC: 143 MMOL/L (ref 136–145)
SP GR UR STRIP: 1.03 (ref 1–1.03)
SQUAMOUS #/AREA URNS HPF: ABNORMAL /HPF
UROBILINOGEN UR QL STRIP: ABNORMAL
WBC # UR STRIP: ABNORMAL /HPF
WBC NRBC COR # BLD: 6.1 10*3/MM3 (ref 3.4–10.8)

## 2023-03-30 PROCEDURE — 80053 COMPREHEN METABOLIC PANEL: CPT | Performed by: PHYSICIAN ASSISTANT

## 2023-03-30 PROCEDURE — 85025 COMPLETE CBC W/AUTO DIFF WBC: CPT | Performed by: PHYSICIAN ASSISTANT

## 2023-03-30 PROCEDURE — 83690 ASSAY OF LIPASE: CPT | Performed by: PHYSICIAN ASSISTANT

## 2023-03-30 PROCEDURE — 81001 URINALYSIS AUTO W/SCOPE: CPT | Performed by: PHYSICIAN ASSISTANT

## 2023-03-30 PROCEDURE — 99283 EMERGENCY DEPT VISIT LOW MDM: CPT

## 2023-03-30 RX ORDER — SODIUM CHLORIDE 0.9 % (FLUSH) 0.9 %
10 SYRINGE (ML) INJECTION AS NEEDED
Status: DISCONTINUED | OUTPATIENT
Start: 2023-03-30 | End: 2023-03-30 | Stop reason: HOSPADM

## 2023-03-30 RX ORDER — VALACYCLOVIR HYDROCHLORIDE 1 G/1
1000 TABLET, FILM COATED ORAL 3 TIMES DAILY
Qty: 21 TABLET | Refills: 0 | Status: SHIPPED | OUTPATIENT
Start: 2023-03-30 | End: 2023-04-06

## 2023-03-30 RX ORDER — ONDANSETRON 2 MG/ML
4 INJECTION INTRAMUSCULAR; INTRAVENOUS ONCE
Status: DISCONTINUED | OUTPATIENT
Start: 2023-03-30 | End: 2023-03-30

## 2023-03-30 RX ORDER — ACETAMINOPHEN 500 MG
1000 TABLET ORAL ONCE
Status: COMPLETED | OUTPATIENT
Start: 2023-03-30 | End: 2023-03-30

## 2023-03-30 RX ADMIN — ACETAMINOPHEN 1000 MG: 500 TABLET, FILM COATED ORAL at 09:51

## 2023-03-30 NOTE — ED PROVIDER NOTES
Subjective   History of Present Illness  Patient is a 68-year-old male who presents with right sided abdominal pain that radiates to the right flank.  Patient was seen here last Monday for similar pain and was found to have nonobstructing kidney stone and a large bladder stone.  Patient reports has had a bladder stone for quite some time and is currently followed with urology however he has not followed up with them since being seen in the ED earlier this week. patient was started on cefdinir which he says he has been taking as prescribed.  Patient reports pain is getting worse and spreading.  Patient reports pain is 9/10.  Patient denies any urinary symptoms. Denies increased urgency or frequency.  Denies pain with urination.  Denies hematuria.  Denies diarrhea/constipation.  Denies fever or recent illness.  Patient reports feeling nauseous without vomiting.  Upon assessment patient is in no acute distress.  He also states he noticed a rash in his right flank area a few days ago and has now noticed it on the right side of his abdomen it does not cross midline he states this is where he is tender.  He states it is not pruritic.        History provided by:  Patient      Review of Systems   Constitutional: Negative for chills, fatigue and fever.   HENT: Negative.    Eyes: Negative.    Respiratory: Negative.         Reports pain with deep respirations. Denies SOA.    Cardiovascular: Negative.  Negative for chest pain and palpitations.   Gastrointestinal: Positive for abdominal pain and nausea. Negative for blood in stool, constipation, diarrhea and vomiting.   Endocrine: Negative.    Genitourinary: Positive for flank pain. Negative for difficulty urinating, dysuria, frequency, hematuria and urgency.   Musculoskeletal: Negative for arthralgias and myalgias.   Skin: Positive for rash.        Red rash reported on Monday    Allergic/Immunologic: Negative.    Neurological: Negative for dizziness, weakness, light-headedness,  numbness and headaches.   Hematological: Negative.    Psychiatric/Behavioral: Negative.        Past Medical History:   Diagnosis Date   • Coronary artery disease involving native coronary artery of native heart 2016    PCI  PCI RCA 16 PCI to LAD 2018   • Hypertension    • Mixed hyperlipidemia 2019   • Type 2 diabetes mellitus without complications (HCC) 2019       Allergies   Allergen Reactions   • Crestor [Rosuvastatin] Myalgia       Past Surgical History:   Procedure Laterality Date   • CARDIAC CATHETERIZATION     • CORONARY STENT PLACEMENT         Family History   Problem Relation Age of Onset   • Heart disease Mother    • COPD Mother    • Cancer Mother    • No Known Problems Father        Social History     Socioeconomic History   • Marital status:    Tobacco Use   • Smoking status: Former     Types: Cigarettes     Quit date:      Years since quittin.2   • Smokeless tobacco: Never   Vaping Use   • Vaping Use: Never used   Substance and Sexual Activity   • Alcohol use: Not Currently   • Drug use: Never   • Sexual activity: Defer           Objective   Physical Exam  Vitals and nursing note reviewed.   Constitutional:       General: He is not in acute distress.     Appearance: He is well-developed. He is obese. He is not ill-appearing, toxic-appearing or diaphoretic.   HENT:      Head: Normocephalic and atraumatic.      Mouth/Throat:      Mouth: Mucous membranes are moist.      Pharynx: Oropharynx is clear.   Eyes:      Extraocular Movements: Extraocular movements intact.      Pupils: Pupils are equal, round, and reactive to light.   Cardiovascular:      Rate and Rhythm: Normal rate and regular rhythm.      Heart sounds: Normal heart sounds. No murmur heard.    No friction rub. No gallop.   Pulmonary:      Effort: Pulmonary effort is normal. No tachypnea or accessory muscle usage.      Breath sounds: Normal breath sounds. No decreased breath sounds, wheezing,  "rhonchi or rales.   Chest:      Chest wall: No mass, deformity, tenderness or crepitus.   Abdominal:      General: Abdomen is protuberant. Bowel sounds are normal. There is no distension.      Palpations: Abdomen is soft. There is no mass.      Tenderness: There is abdominal tenderness in the right lower quadrant. There is no guarding or rebound.   Skin:     General: Skin is warm.      Capillary Refill: Capillary refill takes less than 2 seconds.      Findings: No rash.      Comments: Red erythematous raised bumps on right flank.  Erythematous maculopapular rash to right lower quadrant.  The rash does not cross midline.Appears to follow the same dermatome   Neurological:      Mental Status: He is alert and oriented to person, place, and time.   Psychiatric:         Mood and Affect: Mood normal.         Behavior: Behavior normal.         Procedures           ED Course    /76   Pulse 69   Temp 97.2 °F (36.2 °C)   Resp 19   Ht 172.7 cm (68\")   Wt 110 kg (242 lb)   SpO2 98%   BMI 36.80 kg/m²   Medications   sodium chloride 0.9 % flush 10 mL (has no administration in time range)   acetaminophen (TYLENOL) tablet 1,000 mg (1,000 mg Oral Given 3/30/23 0951)     Labs Reviewed   COMPREHENSIVE METABOLIC PANEL - Abnormal; Notable for the following components:       Result Value    Glucose 165 (*)     All other components within normal limits    Narrative:     GFR Normal >60  Chronic Kidney Disease <60  Kidney Failure <15     URINALYSIS W/ CULTURE IF INDICATED - Abnormal; Notable for the following components:    Ketones, UA Trace (*)     Protein, UA Trace (*)     Leuk Esterase, UA Trace (*)     All other components within normal limits    Narrative:     In absence of clinical symptoms, the presence of pyuria, bacteria, and/or nitrites on the urinalysis result does not correlate with infection.   CBC WITH AUTO DIFFERENTIAL - Abnormal; Notable for the following components:    Lymphocyte % 18.1 (*)     All other " components within normal limits   URINALYSIS, MICROSCOPIC ONLY - Abnormal; Notable for the following components:    WBC, UA 0-2 (*)     All other components within normal limits   LIPASE - Normal   CBC AND DIFFERENTIAL    Narrative:     The following orders were created for panel order CBC & Differential.  Procedure                               Abnormality         Status                     ---------                               -----------         ------                     CBC Auto Differential[[id number]]        Abnormal            Final result                 Please view results for these tests on the individual orders.     No radiology results for the last day                                         Medical Decision Making  Chart Review:   ED visit reviewed from 3/27/2023 Labs are significant for urinary tract infection he was sent home on cefdinir.  CT abdomen and pelvis showed teeny nonobstructing stones in the left kidney no evidence of obstructive uropathy there was a large stone seen in the bladder measuring 3.6 cm.    Comorbidity: As per past medical history  Differentials: Herpes zoster, cellulitis, intra-abdominal infection kidney stone, UTI, pyelonephritis      ;this list is not all inclusive and does not constitute the entirety of considered causes  Labs: as above     Disposition/Treatment:  Appropriate PPE was worn during exam and throughout all encounters with the patient.  While in the ED IV was placed and labs were obtained patient was placed on proper monitor he was afebrile and appeared nontoxic presented with complaints of abdominal and flank pain over the past several days patient was seen in the ED on 3/27/2023 had a CT scan of his abdomen and pelvis at that time which tiny nonobstructing kidney stones in his left kidney but none noted in his right there are no signs of an intra-abdominal infection at that time.  Was also noted to have a bladder stone which patient reports history of.  In  regards to his labs he was found to have a UTI he was given cefdinir for home which he states he has been taking.     Patient declined pain medicine besides Tylenol while in the ED today.  Labs today showed a normal white count and patient was hemodynamically stable the metabolic panel showed hyperglycemia with a glucose of 165 but no signs of DKA with a normal anion gap and bicarb.  Repeat urinalysis now has no bacteria noted seems to be improving on previously prescribed cefdinir.     On physical exam patient did have a rash on the right side of his abdomen and flank area consistent with herpes zoster.  Patient states he first noticed the rash 3 days ago patient will be placed on valacyclovir for 7 days.  He was advised to continue antibiotic as previously directed he does not want any narcotic pain medicine for home.  I do believe his continued pain is likely secondary to herpes zoster intra-abdominal infection was considered though thought to be less likely as he had no signs of an intra-abdominal infection on CT scan 3 days ago.  Patient reports his bladder stone is chronic and he is followed by urology with this currently.  There was no kidney stones noted in the right kidney on recent scan.     Upon reassessment patient is resting quietly findings were discussed with the patient at bedside voiced understanding and discharge along with signs and symptoms to return to the ED he was in agreement with plan all questions were answered.     This document is intended for medical expert use only. Reading of this document by patients and/or patient's family without participating medical staff guidance may result in misinterpretation and unintended morbidity.  Any interpretation of such data is the responsibility of the patient and/or family member responsible for the patient in concert with their primary or specialist providers, not to be left for sources of online searches such as [facility], [employer] or similar queries.  Relying on these approaches to knowledge may result in misinterpretation, misguided goals of care and even death should patients or family members try recommendations outside of the realm of professional medical care in a supervised inpatient environment.     Herpes zoster without complication: acute illness or injury  Amount and/or Complexity of Data Reviewed  External Data Reviewed: radiology.  Labs: ordered. Decision-making details documented in ED Course.      Risk  OTC drugs.  Prescription drug management.          Final diagnoses:   Herpes zoster without complication       ED Disposition  ED Disposition     ED Disposition   Discharge    Condition   Stable    Comment   --             Ta Islas MD  [address] [place] [address] [phone]    Schedule an appointment as soon as possible for a visit in 3 days      [facility] EMERGENCY DEPARTMENT  [facility] [facility] [address] [phone]  Go to   If symptoms worsen         Medication List      New Prescriptions    valACYclovir 1000 MG tablet  Commonly known as: VALTREX  Take 1 tablet by mouth 3 (Three) Times a Day for 7 days.           Where to Get Your Medications      These medications were [facility] to [facility]/pharm[facility] #[id number] [facility] [place] [place] [facility] [address] [facility] [phone]  - [phone] FX  [address] [facility] [place] [address]    Phone: [phone]   · valACYclovir 1000 MG tablet          Sabra Gage PA  03/30/23 1100

## 2023-03-30 NOTE — DISCHARGE INSTRUCTIONS
Take medications as directed    Follow-up with your primary care provider in 3-5 days.  If you do not have a primary care provider call [phone] for help in finding one, or you may follow up with [facility] at [phone].    Return to ED for any new or worsening symptoms

## 2023-09-20 ENCOUNTER — HOSPITAL ENCOUNTER (EMERGENCY)
Facility: HOSPITAL | Age: 68
Discharge: HOME | End: 2023-09-20
Attending: EMERGENCY MEDICINE | Admitting: EMERGENCY MEDICINE

## 2023-09-20 ENCOUNTER — APPOINTMENT (OUTPATIENT)
Dept: CT IMAGING | Facility: HOSPITAL | Age: 68
End: 2023-09-20

## 2023-09-20 VITALS
TEMPERATURE: 98 F | BODY MASS INDEX: 36.37 KG/M2 | RESPIRATION RATE: 20 BRPM | OXYGEN SATURATION: 97 % | SYSTOLIC BLOOD PRESSURE: 128 MMHG | WEIGHT: 240 LBS | DIASTOLIC BLOOD PRESSURE: 81 MMHG | HEIGHT: 68 IN | HEART RATE: 70 BPM

## 2023-09-20 DIAGNOSIS — R10.31 RIGHT LOWER QUADRANT ABDOMINAL PAIN: Primary | ICD-10-CM

## 2023-09-20 DIAGNOSIS — N30.00 ACUTE CYSTITIS WITHOUT HEMATURIA: ICD-10-CM

## 2023-09-20 DIAGNOSIS — N21.0 BLADDER STONE: ICD-10-CM

## 2023-09-20 LAB
ALBUMIN SERPL-MCNC: 4.4 G/DL (ref 3.5–5.2)
ALBUMIN/GLOB SERPL: 1.8 G/DL
ALP SERPL-CCNC: 74 U/L (ref 39–117)
ALT SERPL W P-5'-P-CCNC: 22 U/L (ref 1–41)
ANION GAP SERPL CALCULATED.3IONS-SCNC: 8 MMOL/L (ref 5–15)
AST SERPL-CCNC: 16 U/L (ref 1–40)
BACTERIA UR QL AUTO: ABNORMAL /HPF
BASOPHILS # BLD AUTO: 0.1 10*3/MM3 (ref 0–0.2)
BASOPHILS NFR BLD AUTO: 0.8 % (ref 0–1.5)
BILIRUB SERPL-MCNC: 0.4 MG/DL (ref 0–1.2)
BILIRUB UR QL STRIP: NEGATIVE
BUN SERPL-MCNC: 16 MG/DL (ref 8–23)
BUN/CREAT SERPL: 19.8 (ref 7–25)
CALCIUM SPEC-SCNC: 10 MG/DL (ref 8.6–10.5)
CHLORIDE SERPL-SCNC: 102 MMOL/L (ref 98–107)
CLARITY UR: ABNORMAL
CO2 SERPL-SCNC: 31 MMOL/L (ref 22–29)
COLOR UR: YELLOW
CREAT SERPL-MCNC: 0.81 MG/DL (ref 0.76–1.27)
DEPRECATED RDW RBC AUTO: 43.3 FL (ref 37–54)
EGFRCR SERPLBLD CKD-EPI 2021: 95.4 ML/MIN/1.73
EOSINOPHIL # BLD AUTO: 0.3 10*3/MM3 (ref 0–0.4)
EOSINOPHIL NFR BLD AUTO: 4.7 % (ref 0.3–6.2)
ERYTHROCYTE [DISTWIDTH] IN BLOOD BY AUTOMATED COUNT: 14.5 % (ref 12.3–15.4)
GLOBULIN UR ELPH-MCNC: 2.5 GM/DL
GLUCOSE SERPL-MCNC: 183 MG/DL (ref 65–99)
GLUCOSE UR STRIP-MCNC: ABNORMAL MG/DL
HCT VFR BLD AUTO: 43.9 % (ref 37.5–51)
HGB BLD-MCNC: 14.8 G/DL (ref 13–17.7)
HGB UR QL STRIP.AUTO: ABNORMAL
HYALINE CASTS UR QL AUTO: ABNORMAL /LPF
KETONES UR QL STRIP: NEGATIVE
LEUKOCYTE ESTERASE UR QL STRIP.AUTO: ABNORMAL
LIPASE SERPL-CCNC: 48 U/L (ref 13–60)
LYMPHOCYTES # BLD AUTO: 1.2 10*3/MM3 (ref 0.7–3.1)
LYMPHOCYTES NFR BLD AUTO: 15.5 % (ref 19.6–45.3)
MCH RBC QN AUTO: 29.1 PG (ref 26.6–33)
MCHC RBC AUTO-ENTMCNC: 33.7 G/DL (ref 31.5–35.7)
MCV RBC AUTO: 86.4 FL (ref 79–97)
MONOCYTES # BLD AUTO: 0.4 10*3/MM3 (ref 0.1–0.9)
MONOCYTES NFR BLD AUTO: 5.5 % (ref 5–12)
NEUTROPHILS NFR BLD AUTO: 5.4 10*3/MM3 (ref 1.7–7)
NEUTROPHILS NFR BLD AUTO: 73.5 % (ref 42.7–76)
NITRITE UR QL STRIP: NEGATIVE
NRBC BLD AUTO-RTO: 0 /100 WBC (ref 0–0.2)
PH UR STRIP.AUTO: 5.5 [PH] (ref 5–8)
PLATELET # BLD AUTO: 251 10*3/MM3 (ref 140–450)
PMV BLD AUTO: 8 FL (ref 6–12)
POTASSIUM SERPL-SCNC: 4.2 MMOL/L (ref 3.5–5.2)
PROT SERPL-MCNC: 6.9 G/DL (ref 6–8.5)
PROT UR QL STRIP: ABNORMAL
RBC # BLD AUTO: 5.08 10*6/MM3 (ref 4.14–5.8)
RBC # UR STRIP: ABNORMAL /HPF
REF LAB TEST METHOD: ABNORMAL
SODIUM SERPL-SCNC: 141 MMOL/L (ref 136–145)
SP GR UR STRIP: 1.02 (ref 1–1.03)
SQUAMOUS #/AREA URNS HPF: ABNORMAL /HPF
UROBILINOGEN UR QL STRIP: ABNORMAL
WBC # UR STRIP: ABNORMAL /HPF
WBC NRBC COR # BLD: 7.4 10*3/MM3 (ref 3.4–10.8)

## 2023-09-20 PROCEDURE — 25510000001 IOPAMIDOL PER 1 ML: Performed by: EMERGENCY MEDICINE

## 2023-09-20 PROCEDURE — 74177 CT ABD & PELVIS W/CONTRAST: CPT

## 2023-09-20 PROCEDURE — 81001 URINALYSIS AUTO W/SCOPE: CPT | Performed by: EMERGENCY MEDICINE

## 2023-09-20 PROCEDURE — 80053 COMPREHEN METABOLIC PANEL: CPT | Performed by: EMERGENCY MEDICINE

## 2023-09-20 PROCEDURE — 96365 THER/PROPH/DIAG IV INF INIT: CPT

## 2023-09-20 PROCEDURE — 25010000002 CEFTRIAXONE PER 250 MG: Performed by: EMERGENCY MEDICINE

## 2023-09-20 PROCEDURE — 83690 ASSAY OF LIPASE: CPT | Performed by: EMERGENCY MEDICINE

## 2023-09-20 PROCEDURE — 87086 URINE CULTURE/COLONY COUNT: CPT | Performed by: EMERGENCY MEDICINE

## 2023-09-20 PROCEDURE — 85025 COMPLETE CBC W/AUTO DIFF WBC: CPT | Performed by: EMERGENCY MEDICINE

## 2023-09-20 PROCEDURE — 99285 EMERGENCY DEPT VISIT HI MDM: CPT

## 2023-09-20 RX ORDER — SODIUM CHLORIDE 0.9 % (FLUSH) 0.9 %
10 SYRINGE (ML) INJECTION AS NEEDED
Status: DISCONTINUED | OUTPATIENT
Start: 2023-09-20 | End: 2023-09-20 | Stop reason: HOSPADM

## 2023-09-20 RX ORDER — CEFDINIR 300 MG/1
300 CAPSULE ORAL 2 TIMES DAILY
Qty: 14 CAPSULE | Refills: 0 | Status: SHIPPED | OUTPATIENT
Start: 2023-09-20

## 2023-09-20 RX ADMIN — CEFTRIAXONE 2000 MG: 2 INJECTION, POWDER, FOR SOLUTION INTRAMUSCULAR; INTRAVENOUS at 13:44

## 2023-09-20 RX ADMIN — IOPAMIDOL 100 ML: 755 INJECTION, SOLUTION INTRAVENOUS at 12:41

## 2023-09-20 NOTE — DISCHARGE INSTRUCTIONS
Omnicef sent to your pharmacy.  Rest plenty of fluids.  Follow-up with urologist call tomorrow follow-up in the office for recheck and follow-up urine culture within the next 3 days.  Return for fever altered mental status vomiting unable to urinate no improvement next few days or any other new or worse problems or concerns return immediately to the ER.

## 2023-09-20 NOTE — ED PROVIDER NOTES
Subjective   History of Present Illness  Chief complaint I think I have another urinary tract infection    History of present illness 68-year-old male 8-day history of some dysuria frequency and some white milky discharge.  He denies any fever chills sweats nausea vomiting.  He has some right lower abdominal pain in the right inguinal crease.  No injury.  The patient denies any long car ride plane or immobilization foreign travels no discharge or worry of STD he states he is happily  and has had no penile sores or lesions and his wife has had no symptoms and not concerned about STD.  The patient denies any altered mental status.  He has had a couple of these use gets better with antibiotics and reoccurs.  He is followed by urology and has a prostate ultrasound upcoming.  No black or bloody stool no bloody urine.  Patient is able to eat and drink without difficulty.  No recent flus viruses or vaccinations.    Review of Systems   Constitutional:  Negative for chills and fever.   Respiratory:  Negative for chest tightness and shortness of breath.    Cardiovascular:  Negative for chest pain and palpitations.   Gastrointestinal:  Positive for abdominal pain. Negative for vomiting.   Genitourinary:  Positive for difficulty urinating and dysuria. Negative for genital sores, hematuria, scrotal swelling and testicular pain.   Neurological:  Negative for dizziness and light-headedness.   Psychiatric/Behavioral:  Negative for confusion.      Past Medical History:   Diagnosis Date    Coronary artery disease involving native coronary artery of native heart 12/8/2016    PCI 12/1/12006 PCI RCA 12/14/16 PCI to LAD 11/2018    Hypertension     Mixed hyperlipidemia 11/19/2019    Type 2 diabetes mellitus without complications 11/19/2019       Allergies   Allergen Reactions    Crestor [Rosuvastatin] Myalgia       Past Surgical History:   Procedure Laterality Date    CARDIAC CATHETERIZATION      CORONARY STENT PLACEMENT          Family History   Problem Relation Age of Onset    Heart disease Mother     COPD Mother     Cancer Mother     No Known Problems Father        Social History     Socioeconomic History    Marital status:    Tobacco Use    Smoking status: Former     Types: Cigarettes     Quit date:      Years since quittin.7    Smokeless tobacco: Never   Vaping Use    Vaping Use: Never used   Substance and Sexual Activity    Alcohol use: Not Currently    Drug use: Never    Sexual activity: Defer     Prior to Admission medications    Medication Sig Start Date End Date Taking? Authorizing Provider   albuterol sulfate  (90 Base) MCG/ACT inhaler Inhale 2 puffs Every 4 (Four) Hours As Needed for Wheezing or Shortness of Air. 23   Nery Beltran APRN   amLODIPine (NORVASC) 10 MG tablet Take 1 tablet by mouth Daily. 3/22/23   Kong Brennan MD   cefdinir (OMNICEF) 300 MG capsule Take 1 capsule by mouth 2 (Two) Times a Day. 23   Nir Mckeon MD   losartan (COZAAR) 100 MG tablet Take 1 tablet by mouth Daily. 3/6/23   Kong Brennan MD   metoprolol succinate XL (TOPROL-XL) 50 MG 24 hr tablet Take 1 tablet by mouth Daily. 22   Kong Brennan MD   nitroglycerin (NITROSTAT) 0.4 MG SL tablet 1 under the tongue as needed for angina, may repeat q5mins for up three doses 21   Pradip Mcneill MD   pravastatin (PRAVACHOL) 40 MG tablet Daily. 18   Kong Brennan MD   terazosin (HYTRIN) 5 MG capsule Take 1 capsule by mouth Daily. 3/17/23   Kong Brennan MD   cefdinir (OMNICEF) 300 MG capsule Take 1 capsule by mouth 2 (Two) Times a Day. 23  Lissett Ibrahim APRN   Patient is not on Omnicef        Objective   Physical Exam  Constitutional this is a 68-year-old male awake alert no acute distress triage vital signs reviewed.  HEENT extraocular muscles intact pupils equal round react sclera clear neck supple no adenopathy no meningeal signs  lungs clear no retractions heart regular without murmur abdomen soft nontender good bowel sounds no peritoneal findings or pulsatile masses no inguinal nodes no tenderness throughout the inguinal area no aneurysms or pulsations noted.   examination testicles downgoing without tenderness or masses normal lie normal cremaster reflex not red or hot or fevers no abscess or cellulitic changes noted.  No penile discharge sores or lesions.  Extremities no edema cords or Homans' sign or evidence of DVT skin warm and dry without rashes neurologic awake alert and follows commands motor strength normal without focal weakness.  No face asymmetry speech normal.  Procedures           ED Course      Results for orders placed or performed during the hospital encounter of 09/20/23   Comprehensive Metabolic Panel    Specimen: Blood   Result Value Ref Range    Glucose 183 (H) 65 - 99 mg/dL    BUN 16 8 - 23 mg/dL    Creatinine 0.81 0.76 - 1.27 mg/dL    Sodium 141 136 - 145 mmol/L    Potassium 4.2 3.5 - 5.2 mmol/L    Chloride 102 98 - 107 mmol/L    CO2 31.0 (H) 22.0 - 29.0 mmol/L    Calcium 10.0 8.6 - 10.5 mg/dL    Total Protein 6.9 6.0 - 8.5 g/dL    Albumin 4.4 3.5 - 5.2 g/dL    ALT (SGPT) 22 1 - 41 U/L    AST (SGOT) 16 1 - 40 U/L    Alkaline Phosphatase 74 39 - 117 U/L    Total Bilirubin 0.4 0.0 - 1.2 mg/dL    Globulin 2.5 gm/dL    A/G Ratio 1.8 g/dL    BUN/Creatinine Ratio 19.8 7.0 - 25.0    Anion Gap 8.0 5.0 - 15.0 mmol/L    eGFR 95.4 >60.0 mL/min/1.73   Lipase    Specimen: Blood   Result Value Ref Range    Lipase 48 13 - 60 U/L   Urinalysis With Microscopic If Indicated (No Culture) - Urine, Clean Catch    Specimen: Urine, Clean Catch   Result Value Ref Range    Color, UA Yellow Yellow, Straw    Appearance, UA Cloudy (A) Clear    pH, UA 5.5 5.0 - 8.0    Specific Gravity, UA 1.020 1.005 - 1.030    Glucose,  mg/dL (Trace) (A) Negative    Ketones, UA Negative Negative    Bilirubin, UA Negative Negative    Blood, UA Large (3+)  (A) Negative    Protein,  mg/dL (2+) (A) Negative    Leuk Esterase, UA Moderate (2+) (A) Negative    Nitrite, UA Negative Negative    Urobilinogen, UA 1.0 E.U./dL 0.2 - 1.0 E.U./dL   CBC Auto Differential    Specimen: Blood   Result Value Ref Range    WBC 7.40 3.40 - 10.80 10*3/mm3    RBC 5.08 4.14 - 5.80 10*6/mm3    Hemoglobin 14.8 13.0 - 17.7 g/dL    Hematocrit 43.9 37.5 - 51.0 %    MCV 86.4 79.0 - 97.0 fL    MCH 29.1 26.6 - 33.0 pg    MCHC 33.7 31.5 - 35.7 g/dL    RDW 14.5 12.3 - 15.4 %    RDW-SD 43.3 37.0 - 54.0 fl    MPV 8.0 6.0 - 12.0 fL    Platelets 251 140 - 450 10*3/mm3    Neutrophil % 73.5 42.7 - 76.0 %    Lymphocyte % 15.5 (L) 19.6 - 45.3 %    Monocyte % 5.5 5.0 - 12.0 %    Eosinophil % 4.7 0.3 - 6.2 %    Basophil % 0.8 0.0 - 1.5 %    Neutrophils, Absolute 5.40 1.70 - 7.00 10*3/mm3    Lymphocytes, Absolute 1.20 0.70 - 3.10 10*3/mm3    Monocytes, Absolute 0.40 0.10 - 0.90 10*3/mm3    Eosinophils, Absolute 0.30 0.00 - 0.40 10*3/mm3    Basophils, Absolute 0.10 0.00 - 0.20 10*3/mm3    nRBC 0.0 0.0 - 0.2 /100 WBC   Urinalysis, Microscopic Only - Urine, Clean Catch    Specimen: Urine, Clean Catch   Result Value Ref Range    RBC, UA Too Numerous to Count (A) None Seen /HPF    WBC, UA Too Numerous to Count (A) None Seen /HPF    Bacteria, UA 4+ (A) None Seen /HPF    Squamous Epithelial Cells, UA 0-2 None Seen, 0-2 /HPF    Hyaline Casts, UA 3-6 None Seen /LPF    Methodology Automated Microscopy      CT Abdomen Pelvis With Contrast    Result Date: 9/20/2023  Impression: 1.Persistent large bladder calculus with worsening irregular bladder wall thickening suggesting cystitis. Correlate with symptoms. 2.Other incidental nonemergent findings as detailed above. Electronically Signed: James Carter MD  9/20/2023 11:52 AM CDT  Workstation ID: [id number]   Medications   sodium chloride 0.9 % flush 10 mL (has no administration in time range)   cefTRIAXone (ROCEPHIN) 2,000 mg in sodium chloride 0.9 % 100 mL IVPB (2,000 mg  Intravenous New Bag 9/20/23 1344)   iopamidol (ISOVUE-370) 76 % injection 100 mL (100 mL Intravenous Given 9/20/23 1241)                                            Medical Decision Making  Medical decision making.  IV established monitor placed my review of sinus rhythm.  Labs obtained independent reviewed by me comprehensive metabolic profile unremarkable with a blood sugar of 183 liver lipase unremarkable CBC unremarkable urinalysis shows moderate leukocyte too numerous to count red cells too numerous to count white cells and 4+ bacteria I did culture that.  Patient was given Rocephin 2 g IV.  Patient's records reviewed.  He is been treated for UTI cystitis a couple times in the past he had a culture back in June of this year at some [facility] and is usually sensitive to beta-lactam's.  The patient is nontoxic.  We talked about inpatient with IV antibiotics versus outpatient but he wants to go home.  I do not see evidence of bacteremia or sepsis do not see evidence of appendicitis diverticulitis aortic aneurysm dissections or ischemic bowel testicular torsion or infection at the scrotum STD.  Not a complete list of all possibilities.  He will be discharged home stressed importance of follow-up with his urologist and follow-up the culture.  We had a long discussion about what to return for he voices understanding stable otherwise unremarkable ER course.  Patient did have a CT scan abdomen pelvis I do not see evidence of appendicitis diverticulitis or pancreatitis or ischemic bowel or aneurysm dissection on my independent review.  He has a bladder stone in the left renal stone.  Radiology review agrees.    Problems Addressed:  Acute cystitis without hematuria: complicated acute illness or injury  Bladder stone: complicated acute illness or injury  Right lower quadrant abdominal pain: complicated acute illness or injury    Amount and/or Complexity of Data Reviewed  Labs: ordered. Decision-making details documented in  ED Course.  Radiology: ordered and independent interpretation performed. Decision-making details documented in ED Course.    Risk  Prescription drug management.        Final diagnoses:   Right lower quadrant abdominal pain   Acute cystitis without hematuria   Bladder stone       ED Disposition  ED Disposition       ED Disposition   Discharge    Condition   Stable    Comment   --               Ta Islas MD  [address] [facility] [place] [address]  [phone]    In 1 day      Trent Bowden MD  [facility] [place] [address]  [phone]    In 1 day           Where to Get Your Medications        These medications were sent to [address] [place] [address]      Hours: Monday to Friday 7 AM to 7 PM Phone: [phone]   cefdinir 300 MG capsule          Medication List      No changes were made to your prescriptions during this visit.            Nir Mckeon MD  09/20/23 2288

## 2023-09-21 LAB — BACTERIA SPEC AEROBE CULT: ABNORMAL

## 2023-09-22 NOTE — PROGRESS NOTES
Patient urine culture resulted with  alpha hemolytic streptococcus . Patient was given Rx for cefdinir. Therapy is appropriate coverage. No further follow-up required..    Microbiology Results (last 10 days)       Procedure Component Value - Date/Time    Urine Culture - Urine, Urine, Clean Catch [[id number]]  (Abnormal) Collected: 09/20/23 1119    Lab Status: Final result Specimen: Urine, Clean Catch Updated: 09/21/23 2055     Urine Culture >100,000 CFU/mL Streptococcus, Alpha Hemolytic     Comment:   Based on laboratory diagnosis criteria, these organisms are common urogenital commensal chin and have not been associated with urinary tract infections; clinical correlation is recommended.       Narrative:      Colonization of the urinary tract without infection is common. Treatment is discouraged unless the patient is symptomatic, pregnant, or undergoing an invasive urologic procedure.            Denisha Sims, PharmD  9/22/2023 09:55 EDT

## 2023-10-24 ENCOUNTER — TRANSCRIBE ORDERS (OUTPATIENT)
Dept: ADMINISTRATIVE | Facility: HOSPITAL | Age: 68
End: 2023-10-24
Payer: MEDICARE

## 2023-10-24 ENCOUNTER — LAB (OUTPATIENT)
Dept: LAB | Facility: HOSPITAL | Age: 68
End: 2023-10-24
Payer: MEDICARE

## 2023-10-24 ENCOUNTER — HOSPITAL ENCOUNTER (OUTPATIENT)
Dept: CARDIOLOGY | Facility: HOSPITAL | Age: 68
Discharge: HOME | End: 2023-10-24
Payer: MEDICARE

## 2023-10-24 DIAGNOSIS — N21.0 BLADDER STONE: Primary | ICD-10-CM

## 2023-10-24 DIAGNOSIS — N21.0 BLADDER STONE: ICD-10-CM

## 2023-10-24 LAB
ANION GAP SERPL CALCULATED.3IONS-SCNC: 6.3 MMOL/L (ref 5–15)
BASOPHILS # BLD AUTO: 0.05 10*3/MM3 (ref 0–0.2)
BASOPHILS NFR BLD AUTO: 0.6 % (ref 0–1.5)
BUN SERPL-MCNC: 17 MG/DL (ref 8–23)
BUN/CREAT SERPL: 19.1 (ref 7–25)
CALCIUM SPEC-SCNC: 9.7 MG/DL (ref 8.6–10.5)
CHLORIDE SERPL-SCNC: 104 MMOL/L (ref 98–107)
CO2 SERPL-SCNC: 32.7 MMOL/L (ref 22–29)
CREAT SERPL-MCNC: 0.89 MG/DL (ref 0.76–1.27)
DEPRECATED RDW RBC AUTO: 39.8 FL (ref 37–54)
EGFRCR SERPLBLD CKD-EPI 2021: 92.8 ML/MIN/1.73
EOSINOPHIL # BLD AUTO: 0.6 10*3/MM3 (ref 0–0.4)
EOSINOPHIL NFR BLD AUTO: 7.1 % (ref 0.3–6.2)
ERYTHROCYTE [DISTWIDTH] IN BLOOD BY AUTOMATED COUNT: 13 % (ref 12.3–15.4)
GLUCOSE SERPL-MCNC: 174 MG/DL (ref 65–99)
HCT VFR BLD AUTO: 41.5 % (ref 37.5–51)
HGB BLD-MCNC: 14.1 G/DL (ref 13–17.7)
IMM GRANULOCYTES # BLD AUTO: 0.03 10*3/MM3 (ref 0–0.05)
IMM GRANULOCYTES NFR BLD AUTO: 0.4 % (ref 0–0.5)
LYMPHOCYTES # BLD AUTO: 1.34 10*3/MM3 (ref 0.7–3.1)
LYMPHOCYTES NFR BLD AUTO: 15.8 % (ref 19.6–45.3)
MCH RBC QN AUTO: 28.8 PG (ref 26.6–33)
MCHC RBC AUTO-ENTMCNC: 34 G/DL (ref 31.5–35.7)
MCV RBC AUTO: 84.7 FL (ref 79–97)
MONOCYTES # BLD AUTO: 0.5 10*3/MM3 (ref 0.1–0.9)
MONOCYTES NFR BLD AUTO: 5.9 % (ref 5–12)
NEUTROPHILS NFR BLD AUTO: 5.98 10*3/MM3 (ref 1.7–7)
NEUTROPHILS NFR BLD AUTO: 70.2 % (ref 42.7–76)
NRBC BLD AUTO-RTO: 0.1 /100 WBC (ref 0–0.2)
PLATELET # BLD AUTO: 239 10*3/MM3 (ref 140–450)
PMV BLD AUTO: 10.2 FL (ref 6–12)
POTASSIUM SERPL-SCNC: 4 MMOL/L (ref 3.5–5.2)
RBC # BLD AUTO: 4.9 10*6/MM3 (ref 4.14–5.8)
SODIUM SERPL-SCNC: 143 MMOL/L (ref 136–145)
WBC NRBC COR # BLD: 8.5 10*3/MM3 (ref 3.4–10.8)

## 2023-10-24 PROCEDURE — 93005 ELECTROCARDIOGRAM TRACING: CPT | Performed by: UROLOGY

## 2023-10-24 PROCEDURE — 80048 BASIC METABOLIC PNL TOTAL CA: CPT

## 2023-10-24 PROCEDURE — 36415 COLL VENOUS BLD VENIPUNCTURE: CPT

## 2023-10-24 PROCEDURE — 85025 COMPLETE CBC W/AUTO DIFF WBC: CPT

## 2023-10-25 LAB
QT INTERVAL: 353 MS
QTC INTERVAL: 391 MS

## 2023-12-22 ENCOUNTER — APPOINTMENT (OUTPATIENT)
Dept: GENERAL RADIOLOGY | Facility: HOSPITAL | Age: 68
End: 2023-12-22
Payer: MEDICARE

## 2023-12-22 ENCOUNTER — HOSPITAL ENCOUNTER (INPATIENT)
Facility: HOSPITAL | Age: 68
LOS: 1 days | Discharge: HOME | End: 2023-12-25
Attending: EMERGENCY MEDICINE | Admitting: INTERNAL MEDICINE
Payer: MEDICARE

## 2023-12-22 DIAGNOSIS — R07.9 CHEST PAIN, UNSPECIFIED TYPE: Primary | ICD-10-CM

## 2023-12-22 DIAGNOSIS — R73.03 BORDERLINE DIABETES: ICD-10-CM

## 2023-12-22 DIAGNOSIS — E11.9 TYPE 2 DIABETES MELLITUS WITHOUT COMPLICATION, WITHOUT LONG-TERM CURRENT USE OF INSULIN: ICD-10-CM

## 2023-12-22 DIAGNOSIS — Z90.49 S/P CHOLE: ICD-10-CM

## 2023-12-22 DIAGNOSIS — I20.0 UNSTABLE ANGINA PECTORIS: ICD-10-CM

## 2023-12-22 DIAGNOSIS — I25.10 CORONARY ARTERY DISEASE INVOLVING NATIVE CORONARY ARTERY OF NATIVE HEART WITHOUT ANGINA PECTORIS: ICD-10-CM

## 2023-12-22 DIAGNOSIS — I10 ESSENTIAL HYPERTENSION: ICD-10-CM

## 2023-12-22 DIAGNOSIS — Z01.810 PREOP CARDIOVASCULAR EXAM: ICD-10-CM

## 2023-12-22 DIAGNOSIS — J38.1 VOCAL CORD POLYPS: ICD-10-CM

## 2023-12-22 DIAGNOSIS — Z01.818 PRE-OPERATIVE CLEARANCE: ICD-10-CM

## 2023-12-22 DIAGNOSIS — Z95.5 S/P CORONARY ARTERY STENT PLACEMENT: ICD-10-CM

## 2023-12-22 DIAGNOSIS — E78.2 MIXED HYPERLIPIDEMIA: ICD-10-CM

## 2023-12-22 LAB
ANION GAP SERPL CALCULATED.3IONS-SCNC: 5 MMOL/L (ref 5–15)
APTT PPP: 25.8 SECONDS (ref 61–76.5)
BASOPHILS # BLD AUTO: 0.1 10*3/MM3 (ref 0–0.2)
BASOPHILS NFR BLD AUTO: 0.8 % (ref 0–1.5)
BUN SERPL-MCNC: 17 MG/DL (ref 8–23)
BUN/CREAT SERPL: 18.5 (ref 7–25)
CALCIUM SPEC-SCNC: 9.7 MG/DL (ref 8.6–10.5)
CHLORIDE SERPL-SCNC: 99 MMOL/L (ref 98–107)
CO2 SERPL-SCNC: 34 MMOL/L (ref 22–29)
CREAT SERPL-MCNC: 0.92 MG/DL (ref 0.76–1.27)
DEPRECATED RDW RBC AUTO: 43.8 FL (ref 37–54)
EGFRCR SERPLBLD CKD-EPI 2021: 90 ML/MIN/1.73
EOSINOPHIL # BLD AUTO: 0.4 10*3/MM3 (ref 0–0.4)
EOSINOPHIL NFR BLD AUTO: 5 % (ref 0.3–6.2)
ERYTHROCYTE [DISTWIDTH] IN BLOOD BY AUTOMATED COUNT: 13.9 % (ref 12.3–15.4)
GEN 5 2HR TROPONIN T REFLEX: 12 NG/L
GLUCOSE BLDC GLUCOMTR-MCNC: 107 MG/DL (ref 70–105)
GLUCOSE BLDC GLUCOMTR-MCNC: 194 MG/DL (ref 70–105)
GLUCOSE SERPL-MCNC: 183 MG/DL (ref 65–99)
HCT VFR BLD AUTO: 42.5 % (ref 37.5–51)
HGB BLD-MCNC: 14.4 G/DL (ref 13–17.7)
HOLD SPECIMEN: NORMAL
INR PPP: 1 (ref 0.93–1.1)
LYMPHOCYTES # BLD AUTO: 1.4 10*3/MM3 (ref 0.7–3.1)
LYMPHOCYTES NFR BLD AUTO: 16 % (ref 19.6–45.3)
MAGNESIUM SERPL-MCNC: 1.9 MG/DL (ref 1.6–2.4)
MCH RBC QN AUTO: 28.6 PG (ref 26.6–33)
MCHC RBC AUTO-ENTMCNC: 33.8 G/DL (ref 31.5–35.7)
MCV RBC AUTO: 84.7 FL (ref 79–97)
MONOCYTES # BLD AUTO: 0.5 10*3/MM3 (ref 0.1–0.9)
MONOCYTES NFR BLD AUTO: 6.2 % (ref 5–12)
NEUTROPHILS NFR BLD AUTO: 6.1 10*3/MM3 (ref 1.7–7)
NEUTROPHILS NFR BLD AUTO: 72 % (ref 42.7–76)
NRBC BLD AUTO-RTO: 0.1 /100 WBC (ref 0–0.2)
NT-PROBNP SERPL-MCNC: 178.2 PG/ML (ref 0–900)
PLATELET # BLD AUTO: 265 10*3/MM3 (ref 140–450)
PMV BLD AUTO: 7.6 FL (ref 6–12)
POTASSIUM SERPL-SCNC: 3.4 MMOL/L (ref 3.5–5.2)
PROTHROMBIN TIME: 10.9 SECONDS (ref 9.6–11.7)
QT INTERVAL: 347 MS
QTC INTERVAL: 380 MS
RBC # BLD AUTO: 5.02 10*6/MM3 (ref 4.14–5.8)
SODIUM SERPL-SCNC: 138 MMOL/L (ref 136–145)
TROPONIN T DELTA: -1 NG/L
TROPONIN T SERPL HS-MCNC: 13 NG/L
TSH SERPL DL<=0.05 MIU/L-ACNC: 0.89 UIU/ML (ref 0.27–4.2)
WBC NRBC COR # BLD AUTO: 8.5 10*3/MM3 (ref 3.4–10.8)

## 2023-12-22 PROCEDURE — 93005 ELECTROCARDIOGRAM TRACING: CPT

## 2023-12-22 PROCEDURE — 85730 THROMBOPLASTIN TIME PARTIAL: CPT | Performed by: NURSE PRACTITIONER

## 2023-12-22 PROCEDURE — 84484 ASSAY OF TROPONIN QUANT: CPT | Performed by: NURSE PRACTITIONER

## 2023-12-22 PROCEDURE — G0378 HOSPITAL OBSERVATION PER HR: HCPCS

## 2023-12-22 PROCEDURE — 99285 EMERGENCY DEPT VISIT HI MDM: CPT

## 2023-12-22 PROCEDURE — 83880 ASSAY OF NATRIURETIC PEPTIDE: CPT | Performed by: NURSE PRACTITIONER

## 2023-12-22 PROCEDURE — 85025 COMPLETE CBC W/AUTO DIFF WBC: CPT | Performed by: NURSE PRACTITIONER

## 2023-12-22 PROCEDURE — 93005 ELECTROCARDIOGRAM TRACING: CPT | Performed by: EMERGENCY MEDICINE

## 2023-12-22 PROCEDURE — 36415 COLL VENOUS BLD VENIPUNCTURE: CPT

## 2023-12-22 PROCEDURE — 85610 PROTHROMBIN TIME: CPT | Performed by: NURSE PRACTITIONER

## 2023-12-22 PROCEDURE — 82948 REAGENT STRIP/BLOOD GLUCOSE: CPT

## 2023-12-22 PROCEDURE — 84443 ASSAY THYROID STIM HORMONE: CPT | Performed by: NURSE PRACTITIONER

## 2023-12-22 PROCEDURE — 83735 ASSAY OF MAGNESIUM: CPT | Performed by: NURSE PRACTITIONER

## 2023-12-22 PROCEDURE — 80048 BASIC METABOLIC PNL TOTAL CA: CPT | Performed by: NURSE PRACTITIONER

## 2023-12-22 PROCEDURE — 71045 X-RAY EXAM CHEST 1 VIEW: CPT

## 2023-12-22 RX ORDER — POTASSIUM CHLORIDE 20 MEQ/1
40 TABLET, EXTENDED RELEASE ORAL EVERY 4 HOURS
Status: COMPLETED | OUTPATIENT
Start: 2023-12-22 | End: 2023-12-22

## 2023-12-22 RX ORDER — INSULIN LISPRO 100 [IU]/ML
2-9 INJECTION, SOLUTION INTRAVENOUS; SUBCUTANEOUS
Status: DISCONTINUED | OUTPATIENT
Start: 2023-12-22 | End: 2023-12-25 | Stop reason: HOSPADM

## 2023-12-22 RX ORDER — SODIUM CHLORIDE 9 MG/ML
40 INJECTION, SOLUTION INTRAVENOUS AS NEEDED
Status: DISCONTINUED | OUTPATIENT
Start: 2023-12-22 | End: 2023-12-25 | Stop reason: HOSPADM

## 2023-12-22 RX ORDER — BISACODYL 10 MG
10 SUPPOSITORY, RECTAL RECTAL DAILY PRN
Status: DISCONTINUED | OUTPATIENT
Start: 2023-12-22 | End: 2023-12-25 | Stop reason: HOSPADM

## 2023-12-22 RX ORDER — POLYETHYLENE GLYCOL 3350 17 G/17G
17 POWDER, FOR SOLUTION ORAL DAILY PRN
Status: DISCONTINUED | OUTPATIENT
Start: 2023-12-22 | End: 2023-12-25 | Stop reason: HOSPADM

## 2023-12-22 RX ORDER — TERAZOSIN 5 MG/1
5 CAPSULE ORAL NIGHTLY
Status: DISCONTINUED | OUTPATIENT
Start: 2023-12-22 | End: 2023-12-25 | Stop reason: HOSPADM

## 2023-12-22 RX ORDER — LOSARTAN POTASSIUM 50 MG/1
100 TABLET ORAL NIGHTLY
Status: DISCONTINUED | OUTPATIENT
Start: 2023-12-22 | End: 2023-12-25 | Stop reason: HOSPADM

## 2023-12-22 RX ORDER — IPRATROPIUM BROMIDE AND ALBUTEROL SULFATE 2.5; .5 MG/3ML; MG/3ML
3 SOLUTION RESPIRATORY (INHALATION) ONCE
Status: COMPLETED | OUTPATIENT
Start: 2023-12-22 | End: 2023-12-23

## 2023-12-22 RX ORDER — IBUPROFEN 600 MG/1
1 TABLET ORAL
Status: DISCONTINUED | OUTPATIENT
Start: 2023-12-22 | End: 2023-12-25 | Stop reason: HOSPADM

## 2023-12-22 RX ORDER — ASPIRIN 81 MG/1
81 TABLET ORAL DAILY
Status: DISCONTINUED | OUTPATIENT
Start: 2023-12-23 | End: 2023-12-24 | Stop reason: SDUPTHER

## 2023-12-22 RX ORDER — BISACODYL 5 MG/1
5 TABLET, DELAYED RELEASE ORAL DAILY PRN
Status: DISCONTINUED | OUTPATIENT
Start: 2023-12-22 | End: 2023-12-25 | Stop reason: HOSPADM

## 2023-12-22 RX ORDER — ACETAMINOPHEN 160 MG/5ML
650 SOLUTION ORAL EVERY 4 HOURS PRN
Status: DISCONTINUED | OUTPATIENT
Start: 2023-12-22 | End: 2023-12-24 | Stop reason: SDUPTHER

## 2023-12-22 RX ORDER — DEXTROSE MONOHYDRATE 25 G/50ML
25 INJECTION, SOLUTION INTRAVENOUS
Status: DISCONTINUED | OUTPATIENT
Start: 2023-12-22 | End: 2023-12-25 | Stop reason: HOSPADM

## 2023-12-22 RX ORDER — ACETAMINOPHEN 325 MG/1
650 TABLET ORAL EVERY 4 HOURS PRN
Status: DISCONTINUED | OUTPATIENT
Start: 2023-12-22 | End: 2023-12-24 | Stop reason: SDUPTHER

## 2023-12-22 RX ORDER — SODIUM CHLORIDE 0.9 % (FLUSH) 0.9 %
10 SYRINGE (ML) INJECTION AS NEEDED
Status: DISCONTINUED | OUTPATIENT
Start: 2023-12-22 | End: 2023-12-25 | Stop reason: HOSPADM

## 2023-12-22 RX ORDER — ATORVASTATIN CALCIUM 10 MG/1
10 TABLET, FILM COATED ORAL DAILY
Status: DISCONTINUED | OUTPATIENT
Start: 2023-12-22 | End: 2023-12-24

## 2023-12-22 RX ORDER — METOPROLOL SUCCINATE 50 MG/1
50 TABLET, EXTENDED RELEASE ORAL DAILY
Status: DISCONTINUED | OUTPATIENT
Start: 2023-12-22 | End: 2023-12-24

## 2023-12-22 RX ORDER — ALUMINA, MAGNESIA, AND SIMETHICONE 2400; 2400; 240 MG/30ML; MG/30ML; MG/30ML
15 SUSPENSION ORAL EVERY 6 HOURS PRN
Status: DISCONTINUED | OUTPATIENT
Start: 2023-12-22 | End: 2023-12-24 | Stop reason: SDUPTHER

## 2023-12-22 RX ORDER — ASPIRIN 81 MG/1
81 TABLET ORAL DAILY
COMMUNITY

## 2023-12-22 RX ORDER — SODIUM CHLORIDE 0.9 % (FLUSH) 0.9 %
10 SYRINGE (ML) INJECTION EVERY 12 HOURS SCHEDULED
Status: DISCONTINUED | OUTPATIENT
Start: 2023-12-22 | End: 2023-12-25 | Stop reason: HOSPADM

## 2023-12-22 RX ORDER — ONDANSETRON 2 MG/ML
4 INJECTION INTRAMUSCULAR; INTRAVENOUS EVERY 6 HOURS PRN
Status: DISCONTINUED | OUTPATIENT
Start: 2023-12-22 | End: 2023-12-25 | Stop reason: HOSPADM

## 2023-12-22 RX ORDER — MORPHINE SULFATE 2 MG/ML
2 INJECTION, SOLUTION INTRAMUSCULAR; INTRAVENOUS EVERY 4 HOURS PRN
Status: DISCONTINUED | OUTPATIENT
Start: 2023-12-22 | End: 2023-12-25 | Stop reason: HOSPADM

## 2023-12-22 RX ORDER — AMLODIPINE BESYLATE 5 MG/1
10 TABLET ORAL DAILY
Status: DISCONTINUED | OUTPATIENT
Start: 2023-12-22 | End: 2023-12-23

## 2023-12-22 RX ORDER — AMOXICILLIN 250 MG
2 CAPSULE ORAL 2 TIMES DAILY
Status: DISCONTINUED | OUTPATIENT
Start: 2023-12-22 | End: 2023-12-25 | Stop reason: HOSPADM

## 2023-12-22 RX ORDER — ACETAMINOPHEN 650 MG/1
650 SUPPOSITORY RECTAL EVERY 4 HOURS PRN
Status: DISCONTINUED | OUTPATIENT
Start: 2023-12-22 | End: 2023-12-24 | Stop reason: SDUPTHER

## 2023-12-22 RX ORDER — NICOTINE POLACRILEX 4 MG
15 LOZENGE BUCCAL
Status: DISCONTINUED | OUTPATIENT
Start: 2023-12-22 | End: 2023-12-25 | Stop reason: HOSPADM

## 2023-12-22 RX ADMIN — AMLODIPINE BESYLATE 10 MG: 5 TABLET ORAL at 17:19

## 2023-12-22 RX ADMIN — METOPROLOL SUCCINATE 50 MG: 50 TABLET, EXTENDED RELEASE ORAL at 17:20

## 2023-12-22 RX ADMIN — Medication 10 ML: at 13:27

## 2023-12-22 RX ADMIN — ACETAMINOPHEN 650 MG: 325 TABLET, FILM COATED ORAL at 23:01

## 2023-12-22 RX ADMIN — Medication 10 ML: at 17:19

## 2023-12-22 RX ADMIN — TERAZOSIN HYDROCHLORIDE 5 MG: 5 CAPSULE ORAL at 21:11

## 2023-12-22 RX ADMIN — ATORVASTATIN CALCIUM 10 MG: 10 TABLET, FILM COATED ORAL at 17:20

## 2023-12-22 RX ADMIN — POTASSIUM CHLORIDE 40 MEQ: 1500 TABLET, EXTENDED RELEASE ORAL at 17:19

## 2023-12-22 RX ADMIN — TERAZOSIN HYDROCHLORIDE 5 MG: 5 CAPSULE ORAL at 21:10

## 2023-12-22 RX ADMIN — POTASSIUM CHLORIDE 40 MEQ: 1500 TABLET, EXTENDED RELEASE ORAL at 21:11

## 2023-12-22 RX ADMIN — Medication 10 ML: at 21:10

## 2023-12-22 NOTE — PLAN OF CARE
Goal Outcome Evaluation:      New Admit for chest pain, denies any pain , n/v at this time, patient will be NPO after midnight due to scheduled stress test

## 2023-12-22 NOTE — Clinical Note
Second inflation of balloon - Max pressure = 10 jeromy. 2nd Inflation of balloon - Duration = 8 seconds.

## 2023-12-22 NOTE — ED NOTES
"Nursing report ED to floor  Codey Rhodes  68 y.o.  male    HPI:   Chief Complaint   Patient presents with    Chest Pain       Admitting doctor:   Surya Xie MD    Admitting diagnosis:   The encounter diagnosis was Chest pain, unspecified type.    Code status:   Current Code Status       Date Active Code Status Order ID Comments User Context       Not on file            Allergies:   Crestor [rosuvastatin]    Isolation:  No active isolations     Fall Risk:  Fall Risk Assessment was completed, and patient is at low risk for falls.   Predictive Model Details         6 (Low) Factor Value    Calculated 12/22/2023 16:10 Age 68    Risk of Fall Model Musculoskeletal Assessment WDL     Active Peripheral IV Present     Imaging order in this encounter Present     Skin Assessment WDL     Financial Class Other     Magnesium 1.9 mg/dL     Drug Use No     Tobacco Use Quit     Glenn Scale not on file     Peripheral Vascular Assessment WDL     Chloride 99 mmol/L     Cardiac Assessment X     Clinically Relevant Sex Not Female     Gastrointestinal Assessment WDL     Number of Distinct Medication Classes administered 1     Diastolic BP 86     Albumin not on file     Respiratory Rate 14     Total Bilirubin not on file     Days after Admission 0.122     Creatinine 0.92 mg/dL     Calcium 9.7 mg/dL     Potassium 3.4 mmol/L     ALT not on file         Weight:       12/22/23  1258   Weight: 110 kg (243 lb 9.7 oz)       Intake and Output  No intake or output data in the 24 hours ending 12/22/23 1610    Diet:        Most recent vitals:   Vitals:    12/22/23 1200 12/22/23 1258 12/22/23 1444   BP: (!) 212/100  164/86   BP Location: Right arm  Left arm   Patient Position: Sitting  Sitting   Pulse: 78  72   Resp: 20  14   Temp: 98.1 °F (36.7 °C)     TempSrc: Oral     SpO2: 98%  100%   Weight:  110 kg (243 lb 9.7 oz)    Height: 172.7 cm (68\") 172.7 cm (68\")        Active LDAs/IV Access:   Lines, Drains & Airways       Active LDAs       Name " Placement date Placement time Site Days    Peripheral IV 12/22/23 1326 Right Antecubital 12/22/23  1326  Antecubital  less than 1                    Skin Condition:   Skin Assessments (last day)       None             Labs (abnormal labs have a star):   Labs Reviewed   BASIC METABOLIC PANEL - Abnormal; Notable for the following components:       Result Value    Glucose 183 (*)     Potassium 3.4 (*)     CO2 34.0 (*)     All other components within normal limits    Narrative:     GFR Normal >60  Chronic Kidney Disease <60  Kidney Failure <15     APTT - Abnormal; Notable for the following components:    PTT 25.8 (*)     All other components within normal limits   CBC WITH AUTO DIFFERENTIAL - Abnormal; Notable for the following components:    Lymphocyte % 16.0 (*)     All other components within normal limits   PROTIME-INR - Normal   BNP (IN-HOUSE) - Normal    Narrative:     This assay is used as an aid in the diagnosis of individuals suspected of having heart failure. It can be used as an aid in the diagnosis of acute decompensated heart failure (ADHF) in patients presenting with signs and symptoms of ADHF to the emergency department (ED). In addition, NT-proBNP of <300 pg/mL indicates ADHF is not likely.    Age Range Result Interpretation  NT-proBNP Concentration (pg/mL:      <50             Positive            >450                   Gray                 300-450                    Negative             <300    50-75           Positive            >900                  Gray                300-900                  Negative            <300      >75             Positive            >1800                  Gray                300-1800                  Negative            <300   TROPONIN - Normal    Narrative:     High Sensitive Troponin T Reference Range:  <14.0 ng/L- Negative Female for AMI  <22.0 ng/L- Negative Male for AMI  >=14 - Abnormal Female indicating possible myocardial injury.  >=22 - Abnormal Male indicating  possible myocardial injury.   Clinicians would have to utilize clinical acumen, EKG, Troponin, and serial changes to determine if it is an Acute Myocardial Infarction or myocardial injury due to an underlying chronic condition.        MAGNESIUM - Normal   TSH - Normal   HIGH SENSITIVITIY TROPONIN T 2HR   CBC AND DIFFERENTIAL    Narrative:     The following orders were created for panel order CBC & Differential.  Procedure                               Abnormality         Status                     ---------                               -----------         ------                     CBC Auto Differential[[id number]]        Abnormal            Final result                 Please view results for these tests on the individual orders.   EXTRA TUBES    Narrative:     The following orders were created for panel order Extra Tubes.  Procedure                               Abnormality         Status                     ---------                               -----------         ------                     [facility] - [facility][[id number]]                                   Final result                 Please view results for these tests on the individual orders.   [facility] - [facility]       LOC: Person, Place, Time, and Situation    Telemetry:  Observation Unit    Cardiac Monitoring Ordered: yes    EKG:   ECG 12 Lead Chest Pain   Preliminary Result   HEART RATE= 72  bpm   RR Interval= 836  ms   MO Interval= 149  ms   P Horizontal Axis=   deg   P Front Axis= 65  deg   QRSD Interval= 81  ms   QT Interval= 347  ms   QTcB= 380  ms   QRS Axis= 51  deg   T Wave Axis= 70  deg   - NORMAL ECG -   Sinus rhythm   When compared with ECG of 24-Oct-2023 12:21:59,   No significant change   Electronically Signed By:    Date and Time of Study: 2023-12-22 13:04:42          Medications Given in the ED:   Medications   sodium chloride 0.9 % flush 10 mL (10 mL Intravenous Given 12/22/23 0955)   ipratropium-albuterol (DUO-NEB) nebulizer solution 3 mL (has no  administration in time range)       Imaging results:  XR Chest 1 View    Result Date: 2023  Impression: No acute cardiopulmonary findings. Electronically Signed: Surya Rai MD  2023 1:57 PM EST  Workstation ID: [id number]     Social issues:   Social History     Socioeconomic History    Marital status:    Tobacco Use    Smoking status: Former     Types: Cigarettes     Quit date:      Years since quittin.9    Smokeless tobacco: Never   Vaping Use    Vaping Use: Never used   Substance and Sexual Activity    Alcohol use: Not Currently    Drug use: Never    Sexual activity: Defer       NIH Stroke Scale:  Interval: (not recorded)  1a. Level of Consciousness: (not recorded)  1b. LOC Questions: (not recorded)  1c. LOC Commands: (not recorded)  2. Best Gaze: (not recorded)  3. Visual: (not recorded)  4. Facial Palsy: (not recorded)  5a. Motor Arm, Left: (not recorded)  5b. Motor Arm, Right: (not recorded)  6a. Motor Leg, Left: (not recorded)  6b. Motor Leg, Right: (not recorded)  7. Limb Ataxia: (not recorded)  8. Sensory: (not recorded)  9. Best Language: (not recorded)  10. Dysarthria: (not recorded)  11. Extinction and Inattention (formerly Neglect): (not recorded)    Total (NIH Stroke Scale): (not recorded)     Additional notable assessment information:     Nursing report ED to floor:  Report given to MARY Turner taken room 223     Charbeljuan EDYTA Cueva   23 16:10 EST Nursing report ED to floor  Codey Rhodes  68 y.o.  male    HPI:   Chief Complaint   Patient presents with    Chest Pain       Admitting doctor:   Surya Xie MD    Admitting diagnosis:   The encounter diagnosis was Chest pain, unspecified type.    Code status:   Current Code Status       Date Active Code Status Order ID Comments User Context       Not on file            Allergies:   Crestor [rosuvastatin]    Isolation:  No active isolations     Fall Risk:  Fall Risk Assessment was completed, and patient is at low risk for  "falls.   Predictive Model Details         6 (Low) Factor Value    Calculated 12/22/2023 16:10 Age 68    Risk of Fall Model Musculoskeletal Assessment WDL     Active Peripheral IV Present     Imaging order in this encounter Present     Skin Assessment WDL     Financial Class Other     Magnesium 1.9 mg/dL     Drug Use No     Tobacco Use Quit     Glenn Scale not on file     Peripheral Vascular Assessment WDL     Chloride 99 mmol/L     Cardiac Assessment X     Clinically Relevant Sex Not Female     Gastrointestinal Assessment WDL     Number of Distinct Medication Classes administered 1     Diastolic BP 86     Albumin not on file     Respiratory Rate 14     Total Bilirubin not on file     Days after Admission 0.122     Creatinine 0.92 mg/dL     Calcium 9.7 mg/dL     Potassium 3.4 mmol/L     ALT not on file         Weight:       12/22/23  1258   Weight: 110 kg (243 lb 9.7 oz)       Intake and Output  No intake or output data in the 24 hours ending 12/22/23 1610    Diet:        Most recent vitals:   Vitals:    12/22/23 1200 12/22/23 1258 12/22/23 1444   BP: (!) 212/100  164/86   BP Location: Right arm  Left arm   Patient Position: Sitting  Sitting   Pulse: 78  72   Resp: 20  14   Temp: 98.1 °F (36.7 °C)     TempSrc: Oral     SpO2: 98%  100%   Weight:  110 kg (243 lb 9.7 oz)    Height: 172.7 cm (68\") 172.7 cm (68\")        Active LDAs/IV Access:   Lines, Drains & Airways       Active LDAs       Name Placement date Placement time Site Days    Peripheral IV 12/22/23 1326 Right Antecubital 12/22/23  1326  Antecubital  less than 1                    Skin Condition:   Skin Assessments (last day)       None             Labs (abnormal labs have a star):   Labs Reviewed   BASIC METABOLIC PANEL - Abnormal; Notable for the following components:       Result Value    Glucose 183 (*)     Potassium 3.4 (*)     CO2 34.0 (*)     All other components within normal limits    Narrative:     GFR Normal >60  Chronic Kidney Disease <60  Kidney " Failure <15     APTT - Abnormal; Notable for the following components:    PTT 25.8 (*)     All other components within normal limits   CBC WITH AUTO DIFFERENTIAL - Abnormal; Notable for the following components:    Lymphocyte % 16.0 (*)     All other components within normal limits   PROTIME-INR - Normal   BNP (IN-HOUSE) - Normal    Narrative:     This assay is used as an aid in the diagnosis of individuals suspected of having heart failure. It can be used as an aid in the diagnosis of acute decompensated heart failure (ADHF) in patients presenting with signs and symptoms of ADHF to the emergency department (ED). In addition, NT-proBNP of <300 pg/mL indicates ADHF is not likely.    Age Range Result Interpretation  NT-proBNP Concentration (pg/mL:      <50             Positive            >450                   Gray                 300-450                    Negative             <300    50-75           Positive            >900                  Gray                300-900                  Negative            <300      >75             Positive            >1800                  Gray                300-1800                  Negative            <300   TROPONIN - Normal    Narrative:     High Sensitive Troponin T Reference Range:  <14.0 ng/L- Negative Female for AMI  <22.0 ng/L- Negative Male for AMI  >=14 - Abnormal Female indicating possible myocardial injury.  >=22 - Abnormal Male indicating possible myocardial injury.   Clinicians would have to utilize clinical acumen, EKG, Troponin, and serial changes to determine if it is an Acute Myocardial Infarction or myocardial injury due to an underlying chronic condition.        MAGNESIUM - Normal   TSH - Normal   HIGH SENSITIVITIY TROPONIN T 2HR   CBC AND DIFFERENTIAL    Narrative:     The following orders were created for panel order CBC & Differential.  Procedure                               Abnormality         Status                     ---------                                -----------         ------                     CBC Auto Differential[[id number]]        Abnormal            Final result                 Please view results for these tests on the individual orders.   EXTRA TUBES    Narrative:     The following orders were created for panel order Extra Tubes.  Procedure                               Abnormality         Status                     ---------                               -----------         ------                     [facility] - [facility][[id number]]                                   Final result                 Please view results for these tests on the individual orders.   [facility] - [facility]       LOC: Person, Place, Time, and Situation    Telemetry:  Observation Unit    Cardiac Monitoring Ordered: yes    EKG:   ECG 12 Lead Chest Pain   Preliminary Result   HEART RATE= 72  bpm   RR Interval= 836  ms   NJ Interval= 149  ms   P Horizontal Axis=   deg   P Front Axis= 65  deg   QRSD Interval= 81  ms   QT Interval= 347  ms   QTcB= 380  ms   QRS Axis= 51  deg   T Wave Axis= 70  deg   - NORMAL ECG -   Sinus rhythm   When compared with ECG of 24-Oct-2023 12:21:59,   No significant change   Electronically Signed By:    Date and Time of Study: 2023 13:04:42          Medications Given in the ED:   Medications   sodium chloride 0.9 % flush 10 mL (10 mL Intravenous Given 23 1327)   ipratropium-albuterol (DUO-NEB) nebulizer solution 3 mL (has no administration in time range)       Imaging results:  XR Chest 1 View    Result Date: 2023  Impression: No acute cardiopulmonary findings. Electronically Signed: Surya Rai MD  2023 1:57 PM EST  Workstation ID: [id number]     Social issues:   Social History     Socioeconomic History    Marital status:    Tobacco Use    Smoking status: Former     Types: Cigarettes     Quit date:      Years since quittin.9    Smokeless tobacco: Never   Vaping Use    Vaping Use: Never used   Substance and Sexual Activity     Alcohol use: Not Currently    Drug use: Never    Sexual activity: Defer       NIH Stroke Scale:  Interval: (not recorded)  1a. Level of Consciousness: (not recorded)  1b. LOC Questions: (not recorded)  1c. LOC Commands: (not recorded)  2. Best Gaze: (not recorded)  3. Visual: (not recorded)  4. Facial Palsy: (not recorded)  5a. Motor Arm, Left: (not recorded)  5b. Motor Arm, Right: (not recorded)  6a. Motor Leg, Left: (not recorded)  6b. Motor Leg, Right: (not recorded)  7. Limb Ataxia: (not recorded)  8. Sensory: (not recorded)  9. Best Language: (not recorded)  10. Dysarthria: (not recorded)  11. Extinction and Inattention (formerly Neglect): (not recorded)    Total (NIH Stroke Scale): (not recorded)     Additional notable assessment information:     Nursing report ED to floor:  Report given to MARY Turner taken room 223     Daniela Cueva LPN   12/22/23 16:10 EST

## 2023-12-22 NOTE — Clinical Note
The DP pulses are +2 bilaterally. The PT pulses are +1 bilaterally. [Gradual] : gradual [6] : 6 [Localized] : localized [Constant] : constant [Nothing helps with pain getting better] : Nothing helps with pain getting better [Walking] : walking [de-identified] : 11/29/22 HERE WITH LEFT KNEE PAIN FOR ABOUT 1 YEAR\par NO INJURY\par PT HAS TRY PHYSICAL THERAPY WITH NO RELIEF \par \par 12/27/22 here for a follow up on the left knee , pt had a cortisone injection last visit with some relief will start left knee euflexxa # 1 today \par \par 01/03/22 left knee euflexxa # 2 \par \par 01/10/23 LEFT KNEE EUFLEXXA # 3  [] : no [FreeTextEntry1] : LEFT KNEE  [de-identified] : PHYSICAL THERAPY \par cortisone injection\par EUFLEXXA INJECTIONS

## 2023-12-22 NOTE — Clinical Note
Patient's family at bedside visiting with patient. Dr. Holden also present and providing information about procedure per patient request.

## 2023-12-23 ENCOUNTER — APPOINTMENT (OUTPATIENT)
Dept: NUCLEAR MEDICINE | Facility: HOSPITAL | Age: 68
End: 2023-12-23
Payer: MEDICARE

## 2023-12-23 LAB
ANION GAP SERPL CALCULATED.3IONS-SCNC: 10 MMOL/L (ref 5–15)
BASOPHILS # BLD AUTO: 0.1 10*3/MM3 (ref 0–0.2)
BASOPHILS NFR BLD AUTO: 0.8 % (ref 0–1.5)
BH CV REST NUCLEAR ISOTOPE DOSE: 10.5 MCI
BH CV STRESS BP STAGE 1: NORMAL
BH CV STRESS COMMENTS STAGE 1: NORMAL
BH CV STRESS DOSE REGADENOSON STAGE 1: 0.4
BH CV STRESS DURATION MIN STAGE 1: 0
BH CV STRESS DURATION SEC STAGE 1: 10
BH CV STRESS HR STAGE 1: 86
BH CV STRESS NUCLEAR ISOTOPE DOSE: 31.4 MCI
BH CV STRESS PROTOCOL 1: NORMAL
BH CV STRESS RECOVERY BP: NORMAL MMHG
BH CV STRESS RECOVERY HR: 67 BPM
BH CV STRESS STAGE 1: 1
BUN SERPL-MCNC: 17 MG/DL (ref 8–23)
BUN/CREAT SERPL: 22.1 (ref 7–25)
CALCIUM SPEC-SCNC: 9.2 MG/DL (ref 8.6–10.5)
CHLORIDE SERPL-SCNC: 100 MMOL/L (ref 98–107)
CHOLEST SERPL-MCNC: 129 MG/DL (ref 0–200)
CO2 SERPL-SCNC: 29 MMOL/L (ref 22–29)
CREAT SERPL-MCNC: 0.77 MG/DL (ref 0.76–1.27)
DEPRECATED RDW RBC AUTO: 43.3 FL (ref 37–54)
EGFRCR SERPLBLD CKD-EPI 2021: 96.9 ML/MIN/1.73
EOSINOPHIL # BLD AUTO: 0.5 10*3/MM3 (ref 0–0.4)
EOSINOPHIL NFR BLD AUTO: 5.8 % (ref 0.3–6.2)
ERYTHROCYTE [DISTWIDTH] IN BLOOD BY AUTOMATED COUNT: 13.8 % (ref 12.3–15.4)
GLUCOSE BLDC GLUCOMTR-MCNC: 146 MG/DL (ref 70–105)
GLUCOSE BLDC GLUCOMTR-MCNC: 160 MG/DL (ref 70–105)
GLUCOSE BLDC GLUCOMTR-MCNC: 179 MG/DL (ref 70–105)
GLUCOSE BLDC GLUCOMTR-MCNC: 263 MG/DL (ref 70–105)
GLUCOSE SERPL-MCNC: 154 MG/DL (ref 65–99)
HBA1C MFR BLD: 7.5 % (ref 4.8–5.6)
HCT VFR BLD AUTO: 42.2 % (ref 37.5–51)
HDLC SERPL-MCNC: 36 MG/DL (ref 40–60)
HGB BLD-MCNC: 14.2 G/DL (ref 13–17.7)
LDLC SERPL CALC-MCNC: 69 MG/DL (ref 0–100)
LDLC/HDLC SERPL: 1.84 {RATIO}
LYMPHOCYTES # BLD AUTO: 1.8 10*3/MM3 (ref 0.7–3.1)
LYMPHOCYTES NFR BLD AUTO: 22.4 % (ref 19.6–45.3)
MAXIMAL PREDICTED HEART RATE: 151 BPM
MCH RBC QN AUTO: 28.6 PG (ref 26.6–33)
MCHC RBC AUTO-ENTMCNC: 33.6 G/DL (ref 31.5–35.7)
MCV RBC AUTO: 85 FL (ref 79–97)
MONOCYTES # BLD AUTO: 0.5 10*3/MM3 (ref 0.1–0.9)
MONOCYTES NFR BLD AUTO: 5.7 % (ref 5–12)
NEUTROPHILS NFR BLD AUTO: 5.3 10*3/MM3 (ref 1.7–7)
NEUTROPHILS NFR BLD AUTO: 65.3 % (ref 42.7–76)
NRBC BLD AUTO-RTO: 0.1 /100 WBC (ref 0–0.2)
PERCENT MAX PREDICTED HR: 68.21 %
PLATELET # BLD AUTO: 256 10*3/MM3 (ref 140–450)
PMV BLD AUTO: 7.9 FL (ref 6–12)
POTASSIUM SERPL-SCNC: 3.6 MMOL/L (ref 3.5–5.2)
QT INTERVAL: 368 MS
QTC INTERVAL: 415 MS
RBC # BLD AUTO: 4.96 10*6/MM3 (ref 4.14–5.8)
SODIUM SERPL-SCNC: 139 MMOL/L (ref 136–145)
STRESS BASELINE BP: NORMAL MMHG
STRESS BASELINE HR: 71 BPM
STRESS PERCENT HR: 80 %
STRESS POST PEAK BP: NORMAL MMHG
STRESS POST PEAK HR: 103 BPM
STRESS TARGET HR: 128 BPM
TRIGL SERPL-MCNC: 134 MG/DL (ref 0–150)
VLDLC SERPL-MCNC: 24 MG/DL (ref 5–40)
WBC NRBC COR # BLD AUTO: 8 10*3/MM3 (ref 3.4–10.8)

## 2023-12-23 PROCEDURE — 85025 COMPLETE CBC W/AUTO DIFF WBC: CPT | Performed by: NURSE PRACTITIONER

## 2023-12-23 PROCEDURE — 94799 UNLISTED PULMONARY SVC/PX: CPT

## 2023-12-23 PROCEDURE — 93005 ELECTROCARDIOGRAM TRACING: CPT | Performed by: INTERNAL MEDICINE

## 2023-12-23 PROCEDURE — 25010000002 ENOXAPARIN PER 10 MG: Performed by: INTERNAL MEDICINE

## 2023-12-23 PROCEDURE — 78452 HT MUSCLE IMAGE SPECT MULT: CPT

## 2023-12-23 PROCEDURE — 0 TECHNETIUM TETROFOSMIN KIT: Performed by: EMERGENCY MEDICINE

## 2023-12-23 PROCEDURE — 80061 LIPID PANEL: CPT | Performed by: NURSE PRACTITIONER

## 2023-12-23 PROCEDURE — 99214 OFFICE O/P EST MOD 30 MIN: CPT | Performed by: INTERNAL MEDICINE

## 2023-12-23 PROCEDURE — 83036 HEMOGLOBIN GLYCOSYLATED A1C: CPT | Performed by: NURSE PRACTITIONER

## 2023-12-23 PROCEDURE — 78452 HT MUSCLE IMAGE SPECT MULT: CPT | Performed by: INTERNAL MEDICINE

## 2023-12-23 PROCEDURE — 93017 CV STRESS TEST TRACING ONLY: CPT

## 2023-12-23 PROCEDURE — G0378 HOSPITAL OBSERVATION PER HR: HCPCS

## 2023-12-23 PROCEDURE — 82948 REAGENT STRIP/BLOOD GLUCOSE: CPT

## 2023-12-23 PROCEDURE — 25010000002 REGADENOSON 0.4 MG/5ML SOLUTION: Performed by: EMERGENCY MEDICINE

## 2023-12-23 PROCEDURE — 80048 BASIC METABOLIC PNL TOTAL CA: CPT | Performed by: NURSE PRACTITIONER

## 2023-12-23 PROCEDURE — 94761 N-INVAS EAR/PLS OXIMETRY MLT: CPT

## 2023-12-23 PROCEDURE — 25010000002 MORPHINE PER 10 MG: Performed by: NURSE PRACTITIONER

## 2023-12-23 PROCEDURE — A9502 TC99M TETROFOSMIN: HCPCS | Performed by: EMERGENCY MEDICINE

## 2023-12-23 PROCEDURE — 93018 CV STRESS TEST I&R ONLY: CPT | Performed by: INTERNAL MEDICINE

## 2023-12-23 RX ORDER — ENOXAPARIN SODIUM 100 MG/ML
40 INJECTION SUBCUTANEOUS DAILY
Status: DISCONTINUED | OUTPATIENT
Start: 2023-12-23 | End: 2023-12-24

## 2023-12-23 RX ORDER — REGADENOSON 0.08 MG/ML
0.4 INJECTION, SOLUTION INTRAVENOUS
Status: COMPLETED | OUTPATIENT
Start: 2023-12-23 | End: 2023-12-23

## 2023-12-23 RX ORDER — AMLODIPINE BESYLATE 5 MG/1
5 TABLET ORAL DAILY
Status: DISCONTINUED | OUTPATIENT
Start: 2023-12-24 | End: 2023-12-25 | Stop reason: HOSPADM

## 2023-12-23 RX ORDER — NITROGLYCERIN 0.4 MG/1
0.4 TABLET SUBLINGUAL
Status: DISCONTINUED | OUTPATIENT
Start: 2023-12-23 | End: 2023-12-24 | Stop reason: SDUPTHER

## 2023-12-23 RX ADMIN — NITROGLYCERIN 0.4 MG: 0.4 TABLET SUBLINGUAL at 15:26

## 2023-12-23 RX ADMIN — LOSARTAN POTASSIUM 100 MG: 50 TABLET, FILM COATED ORAL at 20:51

## 2023-12-23 RX ADMIN — TETROFOSMIN 1 DOSE: 1.38 INJECTION, POWDER, LYOPHILIZED, FOR SOLUTION INTRAVENOUS at 08:16

## 2023-12-23 RX ADMIN — REGADENOSON 0.4 MG: 0.08 INJECTION, SOLUTION INTRAVENOUS at 09:15

## 2023-12-23 RX ADMIN — ENOXAPARIN SODIUM 40 MG: 100 INJECTION SUBCUTANEOUS at 19:08

## 2023-12-23 RX ADMIN — TETROFOSMIN 1 DOSE: 1.38 INJECTION, POWDER, LYOPHILIZED, FOR SOLUTION INTRAVENOUS at 09:15

## 2023-12-23 RX ADMIN — MORPHINE SULFATE 2 MG: 2 INJECTION, SOLUTION INTRAMUSCULAR; INTRAVENOUS at 15:15

## 2023-12-23 RX ADMIN — ATORVASTATIN CALCIUM 10 MG: 10 TABLET, FILM COATED ORAL at 20:51

## 2023-12-23 RX ADMIN — TERAZOSIN HYDROCHLORIDE 5 MG: 5 CAPSULE ORAL at 20:51

## 2023-12-23 RX ADMIN — ASPIRIN 81 MG: 81 TABLET, COATED ORAL at 11:33

## 2023-12-23 RX ADMIN — NITROGLYCERIN 0.4 MG: 0.4 TABLET SUBLINGUAL at 15:09

## 2023-12-23 RX ADMIN — Medication 10 ML: at 11:35

## 2023-12-23 RX ADMIN — METOPROLOL SUCCINATE 50 MG: 50 TABLET, EXTENDED RELEASE ORAL at 20:51

## 2023-12-23 RX ADMIN — NITROGLYCERIN 1 INCH: 20 OINTMENT TOPICAL at 19:12

## 2023-12-23 RX ADMIN — Medication 10 ML: at 20:52

## 2023-12-23 RX ADMIN — AMLODIPINE BESYLATE 10 MG: 5 TABLET ORAL at 11:33

## 2023-12-23 RX ADMIN — IPRATROPIUM BROMIDE AND ALBUTEROL SULFATE 3 ML: .5; 3 SOLUTION RESPIRATORY (INHALATION) at 15:29

## 2023-12-23 NOTE — PLAN OF CARE
Goal Outcome Evaluation:  Plan of Care Reviewed With: patient, spouse        Progress: no change  Outcome Evaluation: Positive stress test, awaiting cardiologist to see patient.

## 2023-12-23 NOTE — H&P
[facility] H&P    Patient Name: Codey Rhodes  : 1955  MRN: [id number]  Primary Care Physician: Ta Islas MD  Date of admission: 2023     Patient Care Team:  Ta Islas MD as PCP - General          Subjective   History Present Illness     Chief Complaint:   Chief Complaint   Patient presents with    Chest Pain     Chest Pain     Chest Pain   Pertinent negatives include no irregular heartbeat, malaise/fatigue, nausea, near-syncope, palpitations, shortness of breath or vomiting.     HPI  ED 2023  Context: Chest tightness for the last 2 days that he states feels like it is similar to last time he had some blockages.  Took a nitro today with some relief.  Has had mild shortness of breath with any productive cough or fever.  Recently took up smoking again.  Denies any edema of his lower extremities.      Observation 2023  Patient agrees with HPI noted above including chest tightness for the past 2 days.  Reports history of CAD with cardiac stents a few years ago.  Patient currently asymptomatic.  Discussed abnormal stress test with patient as well as cardiology consultation.    Review of Systems   Constitutional: Negative for malaise/fatigue.   Cardiovascular:  Positive for chest pain. Negative for irregular heartbeat, near-syncope and palpitations.   Respiratory:  Negative for shortness of breath.    Gastrointestinal:  Negative for nausea and vomiting.   All other systems reviewed and are negative.          Personal History     Past Medical History:   Past Medical History:   Diagnosis Date    Coronary artery disease involving native coronary artery of native heart 2016    PCI  PCI RCA 16 PCI to LAD 2018    Hypertension     Mixed hyperlipidemia 2019    Type 2 diabetes mellitus without complications 2019       Surgical History:      Past Surgical History:   Procedure Laterality Date    CARDIAC CATHETERIZATION      CORONARY STENT  PLACEMENT             Family History: family history includes COPD in his mother; Cancer in his mother; Heart disease in his mother; No Known Problems in his father. Otherwise pertinent FHx was reviewed and unremarkable.     Social History:  reports that he has been smoking cigarettes. He has been smoking an average of .25 packs per day. He has never used smokeless tobacco. He reports that he does not currently use alcohol. He reports that he does not use drugs.      Medications:  Prior to Admission medications    Medication Sig Start Date End Date Taking? Authorizing Provider   amLODIPine (NORVASC) 10 MG tablet Take 1 tablet by mouth Daily. 3/22/23  Yes Kong Brennan MD   aspirin 81 MG EC tablet Take 1 tablet by mouth Daily.   Yes Kong Brennan MD   losartan (COZAAR) 100 MG tablet Take 1 tablet by mouth Every Night. 3/6/23  Yes Kong Brennan MD   metoprolol succinate XL (TOPROL-XL) 50 MG 24 hr tablet Take 1 tablet by mouth Daily. 11/25/22  Yes Kong Brennan MD   nitroglycerin (NITROSTAT) 0.4 MG SL tablet 1 under the tongue as needed for angina, may repeat q5mins for up three doses 6/28/21  Yes Pradip Mcneill MD   pravastatin (PRAVACHOL) 40 MG tablet Take 1 tablet by mouth Every Night. 5/18/18  Yes Kong Brennan MD   terazosin (HYTRIN) 5 MG capsule Take 1 capsule by mouth Every Night. 3/17/23  Yes Kong Brennan MD       Allergies:    Allergies   Allergen Reactions    Crestor [Rosuvastatin] Myalgia       Objective   Objective     Vital Signs  Temp:  [97.5 °F (36.4 °C)-98.9 °F (37.2 °C)] 98.1 °F (36.7 °C)  Heart Rate:  [68-88] 88  Resp:  [14-20] 20  BP: (128-196)/(69-90) 156/81  SpO2:  [95 %-100 %] 99 %  on   ;   Device (Oxygen Therapy): room air  Body mass index is 37.04 kg/m².    Physical Exam  Vitals and nursing note reviewed.   Constitutional:       Appearance: Normal appearance. He is obese.      Comments: Patient's voice is raspy.  He denies any respiratory  symptoms.  He states he has polyps in his vocal cords.   HENT:      Head: Normocephalic and atraumatic.      Right Ear: External ear normal.      Left Ear: External ear normal.      Nose: Nose normal.      Mouth/Throat:      Mouth: Mucous membranes are moist.   Eyes:      Extraocular Movements: Extraocular movements intact.   Cardiovascular:      Rate and Rhythm: Normal rate and regular rhythm.      Pulses: Normal pulses.      Heart sounds: Normal heart sounds.   Pulmonary:      Effort: Pulmonary effort is normal.      Breath sounds: Normal breath sounds.   Abdominal:      General: Abdomen is flat. Bowel sounds are normal.      Palpations: Abdomen is soft.   Musculoskeletal:         General: Normal range of motion.      Cervical back: Normal range of motion.   Skin:     General: Skin is warm.   Neurological:      Mental Status: He is alert and oriented to person, place, and time.   Psychiatric:         Behavior: Behavior normal.         Thought Content: Thought content normal.         Judgment: Judgment normal.         Results Review:  I have personally reviewed most recent cardiac tracings, lab results, and radiology images and interpretations and agree with findings, most notably: Troponin, CMP, CBC, EKG and chest x-ray.    Results from last 7 days   Lab Units 12/23/23  0449 12/22/23  1326   WBC 10*3/mm3 8.00 8.50   HEMOGLOBIN g/dL 14.2 14.4   HEMATOCRIT % 42.2 42.5   PLATELETS 10*3/mm3 256 265   INR   --  1.00     Results from last 7 days   Lab Units 12/23/23  0449 12/22/23  1628 12/22/23  1326   SODIUM mmol/L 139  --  138   POTASSIUM mmol/L 3.6  --  3.4*   CHLORIDE mmol/L 100  --  99   CO2 mmol/L 29.0  --  34.0*   BUN mg/dL 17  --  17   CREATININE mg/dL 0.77  --  0.92   GLUCOSE mg/dL 154*  --  183*   CALCIUM mg/dL 9.2  --  9.7   HSTROP T ng/L  --  12 13   PROBNP pg/mL  --   --  178.2     Estimated Creatinine Clearance: 109.4 mL/min (by C-G formula based on SCr of 0.77 mg/dL).  Brief Urine Lab Results  (Last  result in the past 365 days)        Color   Clarity   Blood   Leuk Est   Nitrite   Protein   CREAT   Urine HCG        09/20/23 1119 Yellow   Cloudy   Large (3+)   Moderate (2+)   Negative   100 mg/dL (2+)                   Microbiology Results (last 10 days)       ** No results found for the last 240 hours. **            ECG/EMG Results (most recent)       Procedure Component Value Units Date/Time    ECG 12 Lead Chest Pain [[id number]] Collected: 12/22/23 1304     Updated: 12/22/23 1305     QT Interval 347 ms      QTC Interval 380 ms     Narrative:      HEART RATE= 72  bpm  RR Interval= 836  ms  NE Interval= 149  ms  P Horizontal Axis=   deg  P Front Axis= 65  deg  QRSD Interval= 81  ms  QT Interval= 347  ms  QTcB= 380  ms  QRS Axis= 51  deg  T Wave Axis= 70  deg  - NORMAL ECG -  Sinus rhythm  When compared with ECG of 24-Oct-2023 12:21:59,  No significant change  Electronically Signed By:   Date and Time of Study: 2023-12-22 13:04:42    SCANNED - TELEMETRY   [[id number]] Resulted: 12/22/23     Updated: 12/22/23 2033    SCANNED - TELEMETRY   [[id number]] Resulted: 12/22/23     Updated: 12/22/23 2049    SCANNED - TELEMETRY   [[id number]] Resulted: 12/22/23     Updated: 12/22/23 2311    SCANNED - TELEMETRY   [[id number]] Resulted: 12/22/23     Updated: 12/22/23 2311    SCANNED - TELEMETRY   [[id number]] Resulted: 12/22/23     Updated: 12/22/23 2355                    XR Chest 1 View    Result Date: 12/22/2023  Impression: No acute cardiopulmonary findings. Electronically Signed: Surya Rai MD  12/22/2023 1:57 PM EST  Workstation ID: [id number]       Estimated Creatinine Clearance: 109.4 mL/min (by C-G formula based on SCr of 0.77 mg/dL).    Assessment & Plan   Assessment/Plan       Active Hospital Problems    Diagnosis  POA    **Chest pain [R07.9]  Yes      Resolved Hospital Problems   No resolved problems to display.        Chest pain        Lab Results   Component Value Date     TROPONINT 12 12/22/2023     TROPONINT 13  12/22/2023   -CMP and CBC generally unremarkable except hyperglycemia and mild hypokalemia  -Lipid panel unremarkable except HDL 36  -Chest X-ray: No acute cardiopulmonary findings  -EKG: Sinus rhythm with heart rate 72, WI interval 149 and   -In the ED pt given potassium per electrolyte protocol  -Stress Test showed mild inferior and apical ischemia  -Telemetry  -Cardiology consulted   -N.p.o. at midnight     Diabetes mellitus  -Moderately controlled         Lab Results   Component Value Date     GLUCOSE 154 (H) 12/23/2023     GLUCOSE 183 (H) 12/22/2023     GLUCOSE 174 (H) 10/24/2023     GLUCOSE 183 (H) 09/20/2023   -A1c 7.50  -Sliding scale insulin  -Diabetic diet  -Monitor AC and HS      Hypertension  -Moderately controlled       BP Readings from Last 1 Encounters:   12/23/23 145/90   - Continue losartan, and amlodipine and metoprolol  - Monitor while admitted      Obesity  -BMI 37.04  -Lifestyle modifications           VTE Prophylaxis -   Mechanical Order History:        Ordered        12/22/23 1635  Place Sequential Compression Device  Once            12/22/23 1635  Maintain Sequential Compression Device  Continuous                          Pharmalogical Order History:       None            CODE STATUS:    Code Status and Medical Interventions:   Ordered at: 12/22/23 1626     Level Of Support Discussed With:    Patient     Code Status (Patient has no pulse and is not breathing):    CPR (Attempt to Resuscitate)     Medical Interventions (Patient has pulse or is breathing):    Full Support       This patient has been examined wearing personal protective equipment.     I discussed the patient's findings and my recommendations with patient and nursing staff.      Signature:Electronically signed by KEVIN Cedeno, 12/23/23, 2:07 PM EST.

## 2023-12-23 NOTE — NURSING NOTE
Contacted Dr. Holden to see if he had this patient on his schedule today, the patient does have a cardiology consult but has not been seen yet, consult was ordered 12-22-23. Spoke with Rosa at the answering service.

## 2023-12-23 NOTE — PLAN OF CARE
Problem: Adult Inpatient Plan of Care  Goal: Plan of Care Review  Outcome: Ongoing, Progressing  Flowsheets (Taken 12/23/2023 0214)  Plan of Care Reviewed With: patient  Outcome Evaluation: cardiology to see pt in am. n o c/o chest pain this evening  Goal: Patient-Specific Goal (Individualized)  Outcome: Ongoing, Progressing  Goal: Absence of Hospital-Acquired Illness or Injury  Outcome: Ongoing, Progressing  Intervention: Identify and Manage Fall Risk  Recent Flowsheet Documentation  Taken 12/23/2023 0200 by Love Lopes RN  Safety Promotion/Fall Prevention:   safety round/check completed   nonskid shoes/slippers when out of bed   lighting adjusted  Taken 12/23/2023 0000 by Love Lopes RN  Safety Promotion/Fall Prevention:   safety round/check completed   nonskid shoes/slippers when out of bed   lighting adjusted  Taken 12/22/2023 2000 by Love Lopes RN  Safety Promotion/Fall Prevention:   safety round/check completed   nonskid shoes/slippers when out of bed   lighting adjusted  Intervention: Prevent Skin Injury  Recent Flowsheet Documentation  Taken 12/23/2023 0200 by Love Lopes RN  Body Position: position changed independently  Taken 12/23/2023 0000 by Love Lopes RN  Body Position: position changed independently  Taken 12/22/2023 2000 by Love Lopes RN  Body Position: position changed independently  Intervention: Prevent and Manage VTE (Venous Thromboembolism) Risk  Recent Flowsheet Documentation  Taken 12/23/2023 0000 by Love Lopes RN  Activity Management: bedrest  Taken 12/22/2023 2000 by Love Lopes RN  Activity Management: bedrest  Range of Motion: ROM (range of motion) performed  Intervention: Prevent Infection  Recent Flowsheet Documentation  Taken 12/23/2023 0200 by Love Lopes RN  Infection Prevention:   single patient room provided   rest/sleep promoted  Taken 12/23/2023 0000 by Love Lopes RN  Infection Prevention:    rest/sleep promoted   single patient room provided  Taken 12/22/2023 2000 by Love Lopes RN  Infection Prevention:   personal protective equipment utilized   rest/sleep promoted  Goal: Optimal Comfort and Wellbeing  Outcome: Ongoing, Progressing  Intervention: Provide Person-Centered Care  Recent Flowsheet Documentation  Taken 12/23/2023 0000 by Love Lopes RN  Trust Relationship/Rapport: questions answered  Taken 12/22/2023 2000 by Love Lopes RN  Trust Relationship/Rapport: questions answered  Goal: Readiness for Transition of Care  Outcome: Ongoing, Progressing     Problem: Diabetes Comorbidity  Goal: Blood Glucose Level Within Targeted Range  Outcome: Ongoing, Progressing     Problem: Hypertension Comorbidity  Goal: Blood Pressure in Desired Range  Outcome: Ongoing, Progressing  Intervention: Maintain Blood Pressure Management  Recent Flowsheet Documentation  Taken 12/23/2023 0200 by Love Lopes RN  Medication Review/Management: medications reviewed  Taken 12/23/2023 0000 by Love Lopes RN  Medication Review/Management: medications reviewed     Problem: Chest Pain  Goal: Resolution of Chest Pain Symptoms  Outcome: Ongoing, Progressing   Goal Outcome Evaluation:  Plan of Care Reviewed With: patient           Outcome Evaluation: cardiology to see pt in am. n o c/o chest pain this evening

## 2023-12-23 NOTE — CONSULTS
Referring Provider: Dr. London was  Reason for Consultation: Chest pain    Chief complaint chest pain      Cardiology assessment and plan      Chest pain/symptoms are concerning for unstable angina  Shortness of breath  Dyspnea on exertion  Coronary artery disease s/p coronary intervention  Essential hypertension  Hyperlipidemia  Diabetes is comorbidity  Obesity BMI greater than 35  Multivessel CAD, RCA and LAD with interventions 2016 2016 respectively      Symptoms are concerning for unstable angina  Abnormal stress test  Tmax is 98.9 pulse is 88 respirations are 20 blood pressure is 156/80 sats are 99%  Sodium is 139 potassium is 3.6 creatinine 0.7 hemoglobin is 14   Lexiscan Cardiolite test showed mild inferior and apical ischemia.  Gated SPECT images revealed normal left ventricular size and contractility with ejection fraction of 66%.  Plans to proceed with cardiac catheterization for definite diagnosis and treatment options  Risk benefits and alternatives reviewed and discussed patient                    History of present illness:  Codey Rhodes is a 68 y.o. male who presents with followed by cardiology with CAD, prior PCI remotely 2006 2016 and 2018, essential hypertension hyperlipidemia and diabetes.  Stress Myoview 2020 revealed no reversible ischemia with preserved EF.     Presented with symptoms of chest pain chest pressure shortness of breath and dyspnea on exertion  Progressively worsening symptoms over the last several days  Worsening shortness of breath and dyspnea on exertion  Significant chest discomfort  No syncope  No orthopnea no PND no weight gain          Review of Systems  Review of Systems   Constitutional: Negative for chills, decreased appetite and malaise/fatigue.   HENT:  Negative for congestion and nosebleeds.    Eyes:  Negative for blurred vision and double vision.   Cardiovascular:  Positive for chest pain and dyspnea on exertion. Negative for irregular heartbeat, leg swelling,  near-syncope, orthopnea, palpitations and paroxysmal nocturnal dyspnea.   Respiratory:  Positive for shortness of breath. Negative for cough.    Hematologic/Lymphatic: Negative for adenopathy. Does not bruise/bleed easily.   Skin:  Negative for color change and rash.   Musculoskeletal:  Negative for back pain and joint pain.   Gastrointestinal:  Negative for bloating, abdominal pain, hematemesis and hematochezia.   Genitourinary:  Negative for flank pain and hematuria.   Neurological:  Negative for dizziness and focal weakness.   Psychiatric/Behavioral:  Negative for altered mental status and memory loss.        Past Medical History  Past Medical History:   Diagnosis Date    Coronary artery disease involving native coronary artery of native heart 2016    PCI  PCI RCA 16 PCI to LAD 2018    Hypertension     Mixed hyperlipidemia 2019    Type 2 diabetes mellitus without complications 2019    and   Past Surgical History:   Procedure Laterality Date    CARDIAC CATHETERIZATION      CORONARY STENT PLACEMENT         Family History  Family History   Problem Relation Age of Onset    Heart disease Mother     COPD Mother     Cancer Mother     No Known Problems Father        Social History  Social History     Socioeconomic History    Marital status:    Tobacco Use    Smoking status: Every Day     Packs/day: .25     Types: Cigarettes     Last attempt to quit: 2005     Years since quittin.9    Smokeless tobacco: Never   Vaping Use    Vaping Use: Never used   Substance and Sexual Activity    Alcohol use: Not Currently    Drug use: Never    Sexual activity: Defer       Objective     Physical Exam:  Constitutional:       Appearance: Well-developed.   Eyes:      Conjunctiva/sclera: Conjunctivae normal.      Pupils: Pupils are equal, round, and reactive to light.   HENT:      Head: Normocephalic and atraumatic.   Neck:      Thyroid: No thyromegaly.   Pulmonary:      Effort: Pulmonary  "effort is normal.      Breath sounds: Normal breath sounds.   Cardiovascular:      Normal rate. Regular rhythm.   Pulses:     Intact distal pulses.   Edema:     Peripheral edema absent.   Abdominal:      General: Bowel sounds are normal.      Palpations: Abdomen is soft.   Musculoskeletal:      Cervical back: Normal range of motion and neck supple. Skin:     General: Skin is warm.   Neurological:      Mental Status: Alert and oriented to person, place, and time.         Vital Signs  Vitals:    12/23/23 1012 12/23/23 1013 12/23/23 1015 12/23/23 1334   BP:  (!) 196/90 145/90 156/81   BP Location:  Left arm  Left arm   Patient Position:  Lying  Lying   Pulse:  71 88    Resp: 20      Temp: 98.1 °F (36.7 °C)      TempSrc: Oral      SpO2:  99% 99%    Weight:       Height:           Weight  Flowsheet Rows      Flowsheet Row First Filed Value   Admission Height 172.7 cm (68\") Documented at 12/22/2023 1200   Admission Weight 110 kg (243 lb 9.7 oz) Documented at 12/22/2023 1258                Results Review:  Lab Results (last 24 hours)       Procedure Component Value Units Date/Time    POC Glucose Once [[id number]]  (Abnormal) Collected: 12/23/23 1132    Specimen: Blood Updated: 12/23/23 1139     Glucose 146 mg/dL      Comment: Serial Number: [id number]:  [id number]       Lipid Panel [[id number]]  (Abnormal) Collected: 12/23/23 0449    Specimen: Blood from Arm, Left Updated: 12/23/23 1127     Total Cholesterol 129 mg/dL      Triglycerides 134 mg/dL      HDL Cholesterol 36 mg/dL      LDL Cholesterol  69 mg/dL      VLDL Cholesterol 24 mg/dL      LDL/HDL Ratio 1.84    Narrative:      Cholesterol Reference Ranges  ([place] Department of Health and Human Services ATP III Classifications)    Desirable          <200 mg/dL  Borderline High    200-239 mg/dL  High Risk          >240 mg/dL      Triglyceride Reference Ranges  (U.S. Department of Health and Human Services ATP III Classifications)    Normal           <150 " mg/dL  Borderline High  150-199 mg/dL  High             200-499 mg/dL  Very High        >500 mg/dL    HDL Reference Ranges  (U.S. Department of Health and Human Services ATP III Classifications)    Low     <40 mg/dl (major risk factor for CHD)  High    >60 mg/dl ('negative' risk factor for CHD)        LDL Reference Ranges  (U.S. Department of Health and Human Services ATP III Classifications)    Optimal          <100 mg/dL  Near Optimal     100-129 mg/dL  Borderline High  130-159 mg/dL  High             160-189 mg/dL  Very High        >189 mg/dL    Hemoglobin A1c [[id number]]  (Abnormal) Collected: 12/23/23 0449    Specimen: Blood from Arm, Left Updated: 12/23/23 1001     Hemoglobin A1C 7.50 %     POC Glucose Once [[id number]]  (Abnormal) Collected: 12/23/23 0738    Specimen: Blood Updated: 12/23/23 0739     Glucose 160 mg/dL      Comment: Serial Number: [id number]:  [id number]       Basic Metabolic Panel [[id number]]  (Abnormal) Collected: 12/23/23 0449    Specimen: Blood from Arm, Left Updated: 12/23/23 0541     Glucose 154 mg/dL      BUN 17 mg/dL      Creatinine 0.77 mg/dL      Sodium 139 mmol/L      Potassium 3.6 mmol/L      Chloride 100 mmol/L      CO2 29.0 mmol/L      Calcium 9.2 mg/dL      BUN/Creatinine Ratio 22.1     Anion Gap 10.0 mmol/L      eGFR 96.9 mL/min/1.73     Narrative:      GFR Normal >60  Chronic Kidney Disease <60  Kidney Failure <15      CBC & Differential [[id number]]  (Abnormal) Collected: 12/23/23 0449    Specimen: Blood from Arm, Left Updated: 12/23/23 0530    Narrative:      The following orders were created for panel order CBC & Differential.  Procedure                               Abnormality         Status                     ---------                               -----------         ------                     CBC Auto Differential[[id number]]        Abnormal            Final result                 Please view results for these tests on the individual orders.    CBC Auto  Differential [[id number]]  (Abnormal) Collected: 12/23/23 0449    Specimen: Blood from Arm, Left Updated: 12/23/23 0530     WBC 8.00 10*3/mm3      RBC 4.96 10*6/mm3      Hemoglobin 14.2 g/dL      Hematocrit 42.2 %      MCV 85.0 fL      MCH 28.6 pg      MCHC 33.6 g/dL      RDW 13.8 %      RDW-SD 43.3 fl      MPV 7.9 fL      Platelets 256 10*3/mm3      Neutrophil % 65.3 %      Lymphocyte % 22.4 %      Monocyte % 5.7 %      Eosinophil % 5.8 %      Basophil % 0.8 %      Neutrophils, Absolute 5.30 10*3/mm3      Lymphocytes, Absolute 1.80 10*3/mm3      Monocytes, Absolute 0.50 10*3/mm3      Eosinophils, Absolute 0.50 10*3/mm3      Basophils, Absolute 0.10 10*3/mm3      nRBC 0.1 /100 WBC     POC Glucose Once [[id number]]  (Abnormal) Collected: 12/22/23 2008    Specimen: Blood Updated: 12/22/23 2009     Glucose 194 mg/dL      Comment: Serial Number: [id number]:  [id number]       High Sensitivity Troponin T 2Hr [[id number]]  (Normal) Collected: 12/22/23 1628    Specimen: Blood from Arm, Left Updated: 12/22/23 1701     HS Troponin T 12 ng/L      Troponin T Delta -1 ng/L     Narrative:      High Sensitive Troponin T Reference Range:  <14.0 ng/L- Negative Female for AMI  <22.0 ng/L- Negative Male for AMI  >=14 - Abnormal Female indicating possible myocardial injury.  >=22 - Abnormal Male indicating possible myocardial injury.   Clinicians would have to utilize clinical acumen, EKG, Troponin, and serial changes to determine if it is an Acute Myocardial Infarction or myocardial injury due to an underlying chronic condition.         POC Glucose Once [[id number]]  (Abnormal) Collected: 12/22/23 1659    Specimen: Blood Updated: 12/22/23 1700     Glucose 107 mg/dL      Comment: Serial Number: [id number]:  [id number]       Extra Tubes [[id number]] Collected: 12/22/23 1326    Specimen: Blood, Venous Line Updated: 12/22/23 1431    Narrative:      The following orders were created for panel order Extra Tubes.  Procedure                                Abnormality         Status                     ---------                               -----------         ------                     Gold Top - SST[[id number]]                                   Final result                 Please view results for these tests on the individual orders.    Gold Top - SST [[id number]] Collected: 12/22/23 1326    Specimen: Blood Updated: 12/22/23 1431     Extra Tube Hold for add-ons.     Comment: Auto resulted.       BNP [[id number]]  (Normal) Collected: 12/22/23 1326    Specimen: Blood Updated: 12/22/23 1403     proBNP 178.2 pg/mL     Narrative:      This assay is used as an aid in the diagnosis of individuals suspected of having heart failure. It can be used as an aid in the diagnosis of acute decompensated heart failure (ADHF) in patients presenting with signs and symptoms of ADHF to the emergency department (ED). In addition, NT-proBNP of <300 pg/mL indicates ADHF is not likely.    Age Range Result Interpretation  NT-proBNP Concentration (pg/mL:      <50             Positive            >450                   Gray                 300-450                    Negative             <300    50-75           Positive            >900                  Gray                300-900                  Negative            <300      >75             Positive            >1800                  Gray                300-1800                  Negative            <300    High Sensitivity Troponin T [[id number]]  (Normal) Collected: 12/22/23 1326    Specimen: Blood Updated: 12/22/23 1403     HS Troponin T 13 ng/L     Narrative:      High Sensitive Troponin T Reference Range:  <14.0 ng/L- Negative Female for AMI  <22.0 ng/L- Negative Male for AMI  >=14 - Abnormal Female indicating possible myocardial injury.  >=22 - Abnormal Male indicating possible myocardial injury.   Clinicians would have to utilize clinical acumen, EKG, Troponin, and serial changes to determine if it is an Acute Myocardial  Infarction or myocardial injury due to an underlying chronic condition.         TSH [[id number]]  (Normal) Collected: 12/22/23 1326    Specimen: Blood Updated: 12/22/23 1403     TSH 0.894 uIU/mL           Imaging Results (Last 72 Hours)       Procedure Component Value Units Date/Time    XR Chest 1 View [[id number]] Collected: 12/22/23 1354     Updated: 12/22/23 1359    Narrative:      XR CHEST 1 VW    Date of Exam: 12/22/2023 1:42 PM EST    Indication: cp chest pain    Comparison: 5/21/2023    Findings:  There is slight elevation of the right hemidiaphragm again noted. No pneumothorax or large effusion identified. Heart size within normal limits for technique. Pulmonary vascularity appears within normal limits. There are calcified granulomatous changes   present. There is degenerative change in the spine and left shoulder.      Impression:      Impression:  No acute cardiopulmonary findings.      Electronically Signed: Surya Rai MD    12/22/2023 1:57 PM EST    Workstation ID: [id number]                Medication Review  Scheduled Meds:amLODIPine, 10 mg, Oral, Daily  aspirin, 81 mg, Oral, Daily  atorvastatin, 10 mg, Oral, Daily  insulin lispro, 2-9 Units, Subcutaneous, 4x Daily AC & at Bedtime  ipratropium-albuterol, 3 mL, Nebulization, Once  losartan, 100 mg, Oral, Nightly  metoprolol succinate XL, 50 mg, Oral, Daily  senna-docusate sodium, 2 tablet, Oral, BID  sodium chloride, 10 mL, Intravenous, Q12H  terazosin, 5 mg, Oral, Nightly      Continuous Infusions:   PRN Meds:.  acetaminophen **OR** acetaminophen **OR** acetaminophen    aluminum-magnesium hydroxide-simethicone    senna-docusate sodium **AND** polyethylene glycol **AND** bisacodyl **AND** bisacodyl    dextrose    dextrose    glucagon (human recombinant)    Morphine    ondansetron    [COMPLETED] Insert Peripheral IV **AND** sodium chloride    sodium chloride    sodium chloride    Lab Results   Component Value Date    GLUCOSE 154 (H) 12/23/2023    BUN 17  12/23/2023    CREATININE 0.77 12/23/2023    EGFR 96.9 12/23/2023    BCR 22.1 12/23/2023    K 3.6 12/23/2023    CO2 29.0 12/23/2023    CALCIUM 9.2 12/23/2023    ALBUMIN 4.4 09/20/2023    BILITOT 0.4 09/20/2023    AST 16 09/20/2023    ALT 22 09/20/2023     No results found for this or any previous visit.        Lab Results   Component Value Date    CHOL 129 12/23/2023    CHLPL 126 11/26/2019    TRIG 134 12/23/2023    HDL 36 (L) 12/23/2023    LDL 69 12/23/2023            Assessment & Plan       Chest pain          Alcira Garcia MD  12/23/23  14:02 EST

## 2023-12-24 LAB
ACT BLD: 147 SECONDS (ref 89–137)
ANION GAP SERPL CALCULATED.3IONS-SCNC: 11 MMOL/L (ref 5–15)
BASOPHILS # BLD AUTO: 0.1 10*3/MM3 (ref 0–0.2)
BASOPHILS NFR BLD AUTO: 0.6 % (ref 0–1.5)
BUN SERPL-MCNC: 18 MG/DL (ref 8–23)
BUN/CREAT SERPL: 23.1 (ref 7–25)
CALCIUM SPEC-SCNC: 8.8 MG/DL (ref 8.6–10.5)
CHLORIDE SERPL-SCNC: 103 MMOL/L (ref 98–107)
CO2 SERPL-SCNC: 28 MMOL/L (ref 22–29)
CREAT SERPL-MCNC: 0.78 MG/DL (ref 0.76–1.27)
DEPRECATED RDW RBC AUTO: 40.7 FL (ref 37–54)
EGFRCR SERPLBLD CKD-EPI 2021: 96.5 ML/MIN/1.73
EOSINOPHIL # BLD AUTO: 0.4 10*3/MM3 (ref 0–0.4)
EOSINOPHIL NFR BLD AUTO: 4.4 % (ref 0.3–6.2)
ERYTHROCYTE [DISTWIDTH] IN BLOOD BY AUTOMATED COUNT: 13.5 % (ref 12.3–15.4)
GLUCOSE BLDC GLUCOMTR-MCNC: 120 MG/DL (ref 70–105)
GLUCOSE BLDC GLUCOMTR-MCNC: 159 MG/DL (ref 70–105)
GLUCOSE BLDC GLUCOMTR-MCNC: 195 MG/DL (ref 70–105)
GLUCOSE SERPL-MCNC: 162 MG/DL (ref 65–99)
HCT VFR BLD AUTO: 39.3 % (ref 37.5–51)
HGB BLD-MCNC: 13.4 G/DL (ref 13–17.7)
LYMPHOCYTES # BLD AUTO: 1.5 10*3/MM3 (ref 0.7–3.1)
LYMPHOCYTES NFR BLD AUTO: 17 % (ref 19.6–45.3)
MCH RBC QN AUTO: 29.3 PG (ref 26.6–33)
MCHC RBC AUTO-ENTMCNC: 34.1 G/DL (ref 31.5–35.7)
MCV RBC AUTO: 85.9 FL (ref 79–97)
MONOCYTES # BLD AUTO: 0.6 10*3/MM3 (ref 0.1–0.9)
MONOCYTES NFR BLD AUTO: 7 % (ref 5–12)
NEUTROPHILS NFR BLD AUTO: 6.3 10*3/MM3 (ref 1.7–7)
NEUTROPHILS NFR BLD AUTO: 71 % (ref 42.7–76)
NRBC BLD AUTO-RTO: 0 /100 WBC (ref 0–0.2)
PLATELET # BLD AUTO: 235 10*3/MM3 (ref 140–450)
PMV BLD AUTO: 8 FL (ref 6–12)
POTASSIUM SERPL-SCNC: 3.3 MMOL/L (ref 3.5–5.2)
QT INTERVAL: 374 MS
QTC INTERVAL: 425 MS
RBC # BLD AUTO: 4.58 10*6/MM3 (ref 4.14–5.8)
SODIUM SERPL-SCNC: 142 MMOL/L (ref 136–145)
WBC NRBC COR # BLD AUTO: 8.9 10*3/MM3 (ref 3.4–10.8)

## 2023-12-24 PROCEDURE — 85347 COAGULATION TIME ACTIVATED: CPT

## 2023-12-24 PROCEDURE — 25010000002 NITROGLYCERIN 200 MCG/ML SOLUTION

## 2023-12-24 PROCEDURE — 82948 REAGENT STRIP/BLOOD GLUCOSE: CPT

## 2023-12-24 PROCEDURE — 25010000002 LIDOCAINE 1 % SOLUTION: Performed by: INTERNAL MEDICINE

## 2023-12-24 PROCEDURE — 25010000002 FENTANYL CITRATE (PF) 100 MCG/2ML SOLUTION: Performed by: INTERNAL MEDICINE

## 2023-12-24 PROCEDURE — 25010000002 MIDAZOLAM PER 1 MG: Performed by: INTERNAL MEDICINE

## 2023-12-24 PROCEDURE — C9600 PERC DRUG-EL COR STENT SING: HCPCS | Performed by: INTERNAL MEDICINE

## 2023-12-24 PROCEDURE — C1887 CATHETER, GUIDING: HCPCS | Performed by: INTERNAL MEDICINE

## 2023-12-24 PROCEDURE — 99153 MOD SED SAME PHYS/QHP EA: CPT | Performed by: INTERNAL MEDICINE

## 2023-12-24 PROCEDURE — 25010000002 HEPARIN (PORCINE) PER 1000 UNITS: Performed by: INTERNAL MEDICINE

## 2023-12-24 PROCEDURE — 25810000003 SODIUM CHLORIDE 0.9 % SOLUTION: Performed by: INTERNAL MEDICINE

## 2023-12-24 PROCEDURE — 80048 BASIC METABOLIC PNL TOTAL CA: CPT | Performed by: NURSE PRACTITIONER

## 2023-12-24 PROCEDURE — 92928 PRQ TCAT PLMT NTRAC ST 1 LES: CPT | Performed by: INTERNAL MEDICINE

## 2023-12-24 PROCEDURE — C1894 INTRO/SHEATH, NON-LASER: HCPCS | Performed by: INTERNAL MEDICINE

## 2023-12-24 PROCEDURE — 25510000001 IOPAMIDOL PER 1 ML: Performed by: INTERNAL MEDICINE

## 2023-12-24 PROCEDURE — 93005 ELECTROCARDIOGRAM TRACING: CPT | Performed by: INTERNAL MEDICINE

## 2023-12-24 PROCEDURE — 85025 COMPLETE CBC W/AUTO DIFF WBC: CPT | Performed by: NURSE PRACTITIONER

## 2023-12-24 PROCEDURE — 93454 CORONARY ARTERY ANGIO S&I: CPT | Performed by: INTERNAL MEDICINE

## 2023-12-24 PROCEDURE — 99232 SBSQ HOSP IP/OBS MODERATE 35: CPT | Performed by: INTERNAL MEDICINE

## 2023-12-24 PROCEDURE — 4A023N7 MEASUREMENT OF CARDIAC SAMPLING AND PRESSURE, LEFT HEART, PERCUTANEOUS APPROACH: ICD-10-PCS | Performed by: INTERNAL MEDICINE

## 2023-12-24 PROCEDURE — C1874 STENT, COATED/COV W/DEL SYS: HCPCS | Performed by: INTERNAL MEDICINE

## 2023-12-24 PROCEDURE — C1769 GUIDE WIRE: HCPCS | Performed by: INTERNAL MEDICINE

## 2023-12-24 PROCEDURE — B2111ZZ FLUOROSCOPY OF MULTIPLE CORONARY ARTERIES USING LOW OSMOLAR CONTRAST: ICD-10-PCS | Performed by: INTERNAL MEDICINE

## 2023-12-24 PROCEDURE — 99152 MOD SED SAME PHYS/QHP 5/>YRS: CPT | Performed by: INTERNAL MEDICINE

## 2023-12-24 PROCEDURE — 027034Z DILATION OF CORONARY ARTERY, ONE ARTERY WITH DRUG-ELUTING INTRALUMINAL DEVICE, PERCUTANEOUS APPROACH: ICD-10-PCS | Performed by: INTERNAL MEDICINE

## 2023-12-24 PROCEDURE — 25010000002 ONDANSETRON PER 1 MG: Performed by: INTERNAL MEDICINE

## 2023-12-24 DEVICE — XIENCE SKYPOINT™ EVEROLIMUS ELUTING CORONARY STENT SYSTEM 3.00 MM X 15 MM / RAPID-EXCHANGE
Type: IMPLANTABLE DEVICE | Site: CORONARY | Status: FUNCTIONAL
Brand: XIENCE SKYPOINT™

## 2023-12-24 RX ORDER — ATORVASTATIN CALCIUM 10 MG/1
10 TABLET, FILM COATED ORAL NIGHTLY
Status: DISCONTINUED | OUTPATIENT
Start: 2023-12-24 | End: 2023-12-25 | Stop reason: HOSPADM

## 2023-12-24 RX ORDER — MIDAZOLAM HYDROCHLORIDE 1 MG/ML
INJECTION INTRAMUSCULAR; INTRAVENOUS
Status: DISCONTINUED | OUTPATIENT
Start: 2023-12-24 | End: 2023-12-24 | Stop reason: HOSPADM

## 2023-12-24 RX ORDER — SODIUM CHLORIDE 9 MG/ML
100 INJECTION, SOLUTION INTRAVENOUS CONTINUOUS
Status: DISPENSED | OUTPATIENT
Start: 2023-12-24 | End: 2023-12-24

## 2023-12-24 RX ORDER — NITROGLYCERIN 20 MG/100ML
INJECTION INTRAVENOUS
Status: COMPLETED
Start: 2023-12-24 | End: 2023-12-24

## 2023-12-24 RX ORDER — ALUMINA, MAGNESIA, AND SIMETHICONE 2400; 2400; 240 MG/30ML; MG/30ML; MG/30ML
15 SUSPENSION ORAL EVERY 6 HOURS PRN
Status: DISCONTINUED | OUTPATIENT
Start: 2023-12-24 | End: 2023-12-25 | Stop reason: HOSPADM

## 2023-12-24 RX ORDER — ASPIRIN 81 MG/1
81 TABLET ORAL DAILY
Status: DISCONTINUED | OUTPATIENT
Start: 2023-12-25 | End: 2023-12-25 | Stop reason: HOSPADM

## 2023-12-24 RX ORDER — SODIUM CHLORIDE 9 MG/ML
250 INJECTION, SOLUTION INTRAVENOUS ONCE AS NEEDED
Status: DISCONTINUED | OUTPATIENT
Start: 2023-12-24 | End: 2023-12-25 | Stop reason: HOSPADM

## 2023-12-24 RX ORDER — FENTANYL CITRATE 50 UG/ML
INJECTION, SOLUTION INTRAMUSCULAR; INTRAVENOUS
Status: DISCONTINUED | OUTPATIENT
Start: 2023-12-24 | End: 2023-12-24 | Stop reason: HOSPADM

## 2023-12-24 RX ORDER — HEPARIN SODIUM 1000 [USP'U]/ML
INJECTION, SOLUTION INTRAVENOUS; SUBCUTANEOUS
Status: DISCONTINUED | OUTPATIENT
Start: 2023-12-24 | End: 2023-12-24 | Stop reason: HOSPADM

## 2023-12-24 RX ORDER — NITROGLYCERIN 20 MG/100ML
5-200 INJECTION INTRAVENOUS
Status: DISCONTINUED | OUTPATIENT
Start: 2023-12-24 | End: 2023-12-25 | Stop reason: HOSPADM

## 2023-12-24 RX ORDER — ONDANSETRON 2 MG/ML
INJECTION INTRAMUSCULAR; INTRAVENOUS
Status: DISCONTINUED | OUTPATIENT
Start: 2023-12-24 | End: 2023-12-24 | Stop reason: HOSPADM

## 2023-12-24 RX ORDER — POTASSIUM CHLORIDE 20 MEQ/1
40 TABLET, EXTENDED RELEASE ORAL EVERY 4 HOURS
Status: COMPLETED | OUTPATIENT
Start: 2023-12-24 | End: 2023-12-24

## 2023-12-24 RX ORDER — NITROGLYCERIN 0.4 MG/1
0.4 TABLET SUBLINGUAL
Status: DISCONTINUED | OUTPATIENT
Start: 2023-12-24 | End: 2023-12-25 | Stop reason: HOSPADM

## 2023-12-24 RX ORDER — METOPROLOL SUCCINATE 50 MG/1
50 TABLET, EXTENDED RELEASE ORAL NIGHTLY
Status: DISCONTINUED | OUTPATIENT
Start: 2023-12-24 | End: 2023-12-25 | Stop reason: HOSPADM

## 2023-12-24 RX ORDER — ACETAMINOPHEN 325 MG/1
650 TABLET ORAL EVERY 4 HOURS PRN
Status: DISCONTINUED | OUTPATIENT
Start: 2023-12-24 | End: 2023-12-25 | Stop reason: HOSPADM

## 2023-12-24 RX ORDER — LIDOCAINE HYDROCHLORIDE 10 MG/ML
INJECTION, SOLUTION INFILTRATION; PERINEURAL
Status: DISCONTINUED | OUTPATIENT
Start: 2023-12-24 | End: 2023-12-24 | Stop reason: HOSPADM

## 2023-12-24 RX ORDER — CLOPIDOGREL BISULFATE 75 MG/1
75 TABLET ORAL DAILY
Status: DISCONTINUED | OUTPATIENT
Start: 2023-12-25 | End: 2023-12-25 | Stop reason: HOSPADM

## 2023-12-24 RX ORDER — CLOPIDOGREL BISULFATE 75 MG/1
TABLET ORAL
Status: DISCONTINUED | OUTPATIENT
Start: 2023-12-24 | End: 2023-12-24 | Stop reason: HOSPADM

## 2023-12-24 RX ADMIN — Medication 10 ML: at 08:13

## 2023-12-24 RX ADMIN — ACETAMINOPHEN 650 MG: 325 TABLET, FILM COATED ORAL at 19:16

## 2023-12-24 RX ADMIN — ACETAMINOPHEN 650 MG: 325 TABLET, FILM COATED ORAL at 14:55

## 2023-12-24 RX ADMIN — METOPROLOL SUCCINATE 50 MG: 50 TABLET, EXTENDED RELEASE ORAL at 20:49

## 2023-12-24 RX ADMIN — NITROGLYCERIN 5 MCG/MIN: 20 INJECTION INTRAVENOUS at 14:28

## 2023-12-24 RX ADMIN — AMLODIPINE BESYLATE 5 MG: 5 TABLET ORAL at 14:35

## 2023-12-24 RX ADMIN — Medication 10 ML: at 21:00

## 2023-12-24 RX ADMIN — ATORVASTATIN CALCIUM 10 MG: 10 TABLET, FILM COATED ORAL at 20:50

## 2023-12-24 RX ADMIN — LOSARTAN POTASSIUM 100 MG: 50 TABLET, FILM COATED ORAL at 20:49

## 2023-12-24 RX ADMIN — ASPIRIN 81 MG: 81 TABLET, COATED ORAL at 06:14

## 2023-12-24 RX ADMIN — POTASSIUM CHLORIDE 40 MEQ: 1500 TABLET, EXTENDED RELEASE ORAL at 14:35

## 2023-12-24 RX ADMIN — SODIUM CHLORIDE 100 ML/HR: 9 INJECTION, SOLUTION INTRAVENOUS at 01:31

## 2023-12-24 RX ADMIN — NITROGLYCERIN 1 INCH: 20 OINTMENT TOPICAL at 06:14

## 2023-12-24 RX ADMIN — POTASSIUM CHLORIDE 40 MEQ: 1500 TABLET, EXTENDED RELEASE ORAL at 08:13

## 2023-12-24 RX ADMIN — TERAZOSIN HYDROCHLORIDE 5 MG: 5 CAPSULE ORAL at 20:49

## 2023-12-24 NOTE — PROGRESS NOTES
Cardiology [facility]      Patient Care Team:  Ta Islas MD as PCP - General    Cardiology assessment and plan        Chest pain/symptoms are concerning for unstable angina  Shortness of breath  Dyspnea on exertion  Coronary artery disease s/p coronary intervention  Essential hypertension  Hyperlipidemia  Diabetes is comorbidity  Obesity BMI greater than 35  Multivessel CAD, RCA and LAD with interventions 2016 2016 respectively        Symptoms are concerning for unstable angina  Abnormal stress test  Continue to have ongoing symptoms of chest pain on maximal medical therapy  Lexiscan Cardiolite test showed mild inferior and apical ischemia.  Gated SPECT images revealed normal left ventricular size and contractility with ejection fraction of 66%.  Tmax is 97.5 pulse is 70 respirations are 18 blood pressure is 149/84 sats are 100%  Sodium is 142 potassium is 3.3 creatinine is 13.4  Plans to proceed with cardiac catheterization for definite diagnosis and treatment options  Risk benefits and alternatives reviewed and discussed patient            Chief Complaint: Chest pain and unstable angina    Subjective no new complaints    Interval History: No significant change in the overall status    History taken from: patient    Review of Systems:  Review of Systems   Constitutional: Negative for chills, decreased appetite and malaise/fatigue.   HENT:  Negative for congestion and nosebleeds.    Eyes:  Negative for blurred vision and double vision.   Cardiovascular:  Positive for chest pain and dyspnea on exertion. Negative for irregular heartbeat, leg swelling, near-syncope, orthopnea, palpitations and paroxysmal nocturnal dyspnea.   Respiratory:  Positive for shortness of breath. Negative for cough.    Hematologic/Lymphatic: Negative for adenopathy. Does not bruise/bleed easily.   Skin:  Negative for color change and rash.   Musculoskeletal:  Negative for back pain and joint pain.   Gastrointestinal:  Negative for  "bloating, abdominal pain, hematemesis and hematochezia.   Genitourinary:  Negative for flank pain and hematuria.   Neurological:  Negative for dizziness and focal weakness.   Psychiatric/Behavioral:  Negative for altered mental status. The patient does not have insomnia.        Objective    Vital Signs  Visit Vitals  /84   Pulse 73   Temp 97.5 °F (36.4 °C) (Oral)   Resp 18   Ht 172.7 cm (68\")   Wt 110 kg (243 lb 9.7 oz)   SpO2 100%   BMI 37.04 kg/m²     Oxygen Therapy  SpO2: 100 %  Pulse Oximetry Type: Continuous  Device (Oxygen Therapy): room air  Flow (L/min): 2  Flowsheet Rows      Flowsheet Row First Filed Value   Admission Height 172.7 cm (68\") Documented at 12/22/2023 1200   Admission Weight 110 kg (243 lb 9.7 oz) Documented at 12/22/2023 1258          Intake & Output (last 3 days)         12/21 0701  12/22 0700 12/22 0701  12/23 0700 12/23 0701  12/24 0700 12/24 0701  12/25 0700    P.O.  240 924     Total Intake(mL/kg)  240 (2.2) 924 (8.4)     Net  +240 +924                   Lines, Drains & Airways       Active LDAs       Name Placement date Placement time Site Days    Peripheral IV 12/22/23 1326 Right Antecubital 12/22/23  1326  Antecubital  1    Arterial Sheath 6 Fr. Right Femoral 12/24/23  0917  Femoral  less than 1                    Physical Exam:  Constitutional:       Appearance: Well-developed.   Eyes:      Conjunctiva/sclera: Conjunctivae normal.      Pupils: Pupils are equal, round, and reactive to light.   HENT:      Head: Normocephalic and atraumatic.   Neck:      Thyroid: No thyromegaly.   Pulmonary:      Effort: Pulmonary effort is normal.      Breath sounds: Normal breath sounds.   Cardiovascular:      Normal rate. Regular rhythm.   Pulses:     Intact distal pulses.   Edema:     Peripheral edema absent.   Abdominal:      General: Bowel sounds are normal.      Palpations: Abdomen is soft.   Musculoskeletal:      Cervical back: Normal range of motion and neck supple. Skin:     General: " Skin is warm.   Neurological:      Mental Status: Alert and oriented to person, place, and time.         Results Review:     I reviewed the patient's new clinical results.    Lab Results (last 24 hours)       Procedure Component Value Units Date/Time    POC Glucose Once [[id number]]  (Abnormal) Collected: 12/24/23 0725    Specimen: Blood Updated: 12/24/23 0727     Glucose 159 mg/dL      Comment: Serial Number: [id number]:  [id number]       Basic Metabolic Panel [[id number]]  (Abnormal) Collected: 12/24/23 0436    Specimen: Blood from Hand, Left Updated: 12/24/23 0552     Glucose 162 mg/dL      BUN 18 mg/dL      Creatinine 0.78 mg/dL      Sodium 142 mmol/L      Potassium 3.3 mmol/L      Chloride 103 mmol/L      CO2 28.0 mmol/L      Calcium 8.8 mg/dL      BUN/Creatinine Ratio 23.1     Anion Gap 11.0 mmol/L      eGFR 96.5 mL/min/1.73     Narrative:      GFR Normal >60  Chronic Kidney Disease <60  Kidney Failure <15      CBC & Differential [[id number]]  (Abnormal) Collected: 12/24/23 0436    Specimen: Blood from Hand, Left Updated: 12/24/23 0538    Narrative:      The following orders were created for panel order CBC & Differential.  Procedure                               Abnormality         Status                     ---------                               -----------         ------                     CBC Auto Differential[[id number]]        Abnormal            Final result                 Please view results for these tests on the individual orders.    CBC Auto Differential [[id number]]  (Abnormal) Collected: 12/24/23 0436    Specimen: Blood from Hand, Left Updated: 12/24/23 0538     WBC 8.90 10*3/mm3      RBC 4.58 10*6/mm3      Hemoglobin 13.4 g/dL      Hematocrit 39.3 %      MCV 85.9 fL      MCH 29.3 pg      MCHC 34.1 g/dL      RDW 13.5 %      RDW-SD 40.7 fl      MPV 8.0 fL      Platelets 235 10*3/mm3      Neutrophil % 71.0 %      Lymphocyte % 17.0 %      Monocyte % 7.0 %      Eosinophil % 4.4 %      Basophil %  0.6 %      Neutrophils, Absolute 6.30 10*3/mm3      Lymphocytes, Absolute 1.50 10*3/mm3      Monocytes, Absolute 0.60 10*3/mm3      Eosinophils, Absolute 0.40 10*3/mm3      Basophils, Absolute 0.10 10*3/mm3      nRBC 0.0 /100 WBC     POC Glucose Once [[id number]]  (Abnormal) Collected: 12/23/23 2009    Specimen: Blood Updated: 12/23/23 2011     Glucose 263 mg/dL      Comment: Serial Number: [id number]:  [id number]       POC Glucose Once [[id number]]  (Abnormal) Collected: 12/23/23 1654    Specimen: Blood Updated: 12/23/23 1656     Glucose 179 mg/dL      Comment: Serial Number: [id number]:  [id number]       POC Glucose Once [[id number]]  (Abnormal) Collected: 12/23/23 1132    Specimen: Blood Updated: 12/23/23 1139     Glucose 146 mg/dL      Comment: Serial Number: [id number]:  [id number]       Lipid Panel [[id number]]  (Abnormal) Collected: 12/23/23 0449    Specimen: Blood from Arm, Left Updated: 12/23/23 1127     Total Cholesterol 129 mg/dL      Triglycerides 134 mg/dL      HDL Cholesterol 36 mg/dL      LDL Cholesterol  69 mg/dL      VLDL Cholesterol 24 mg/dL      LDL/HDL Ratio 1.84    Narrative:      Cholesterol Reference Ranges  (U.S. Department of Health and Human Services ATP III Classifications)    Desirable          <200 mg/dL  Borderline High    200-239 mg/dL  High Risk          >240 mg/dL      Triglyceride Reference Ranges  (U.S. Department of Health and Human Services ATP III Classifications)    Normal           <150 mg/dL  Borderline High  150-199 mg/dL  High             200-499 mg/dL  Very High        >500 mg/dL    HDL Reference Ranges  (U.S. Department of Health and Human Services ATP III Classifications)    Low     <40 mg/dl (major risk factor for CHD)  High    >60 mg/dl ('negative' risk factor for CHD)        LDL Reference Ranges  (U.S. Department of Health and Human Services ATP III Classifications)    Optimal          <100 mg/dL  Near Optimal     100-129 mg/dL  Borderline High  130-159  mg/dL  High             160-189 mg/dL  Very High        >189 mg/dL          No results found for this or any previous visit.        Medication Review:   I have reviewed the patient's current medication list  Scheduled Meds:amLODIPine, 5 mg, Oral, Daily  [MAR Hold] aspirin, 81 mg, Oral, Daily  [MAR Hold] atorvastatin, 10 mg, Oral, Daily  [MAR Hold] enoxaparin, 40 mg, Subcutaneous, Daily  [MAR Hold] insulin lispro, 2-9 Units, Subcutaneous, 4x Daily AC & at Bedtime  losartan, 100 mg, Oral, Nightly  metoprolol succinate XL, 50 mg, Oral, Daily  [MAR Hold] nitroglycerin, 1 inch, Topical, Q6H  potassium chloride, 40 mEq, Oral, Q4H  [MAR Hold] senna-docusate sodium, 2 tablet, Oral, BID  [MAR Hold] sodium chloride, 10 mL, Intravenous, Q12H  terazosin, 5 mg, Oral, Nightly      Continuous Infusions:   PRN Meds:.  [MAR Hold] acetaminophen **OR** [MAR Hold] acetaminophen **OR** [MAR Hold] acetaminophen    [MAR Hold] aluminum-magnesium hydroxide-simethicone    [MAR Hold] senna-docusate sodium **AND** [MAR Hold] polyethylene glycol **AND** [MAR Hold] bisacodyl **AND** [MAR Hold] bisacodyl    [MAR Hold] Calcium Replacement - Follow Nurse / BPA Driven Protocol    clopidogrel    [MAR Hold] dextrose    [MAR Hold] dextrose    fentaNYL citrate (PF)    [MAR Hold] glucagon (human recombinant)    heparin (porcine)    iopamidol    lidocaine    [MAR Hold] Magnesium Standard Dose Replacement - Follow Nurse / BPA Driven Protocol    midazolam    [MAR Hold] Morphine    [MAR Hold] nitroglycerin    [MAR Hold] ondansetron    [MAR Hold] Phosphorus Replacement - Follow Nurse / BPA Driven Protocol    Potassium Replacement - Follow Nurse / BPA Driven Protocol    [COMPLETED] Insert Peripheral IV **AND** [MAR Hold] sodium chloride    [MAR Hold] sodium chloride    [MAR Hold] sodium chloride    ECG/EMG Results (last 24 hours)       Procedure Component Value Units Date/Time    SCANNED - TELEMETRY   [[id number]] Resulted: 12/22/23     Updated: 12/23/23 1819     SCANNED - TELEMETRY   [[id number]] Resulted: 12/22/23     Updated: 12/23/23 1819    SCANNED - TELEMETRY   [[id number]] Resulted: 12/22/23     Updated: 12/23/23 1946    SCANNED - TELEMETRY   [[id number]] Resulted: 12/22/23     Updated: 12/23/23 2113    SCANNED - TELEMETRY   [[id number]] Resulted: 12/22/23     Updated: 12/23/23 2248    SCANNED - TELEMETRY   [[id number]] Resulted: 12/22/23     Updated: 12/23/23 2248            Imaging Results (Last 24 Hours)       ** No results found for the last 24 hours. **          Results for orders placed during the hospital encounter of 12/22/23    Cardiac Catheterization/Vascular Study (Needs Review)  This result has not been signed. Information might be incomplete.    Narrative  Table formatting from the original result was not included.  Cardiac Catheterization with PCI Report    Codey JONEL Meagan  [id number]  12/24/2023  @PCP@    He underwent cardiac catheterization and percutaneous coronary intervention.    Indications for the procedure include: acute coronary syndrome.  Patient presented with symptoms of unstable angina and abnormal stress test with refractory recurrent chest pain on maximal medical therapy    Procedure Details:  The risks, benefits, complications, treatment options, and expected outcomes were discussed with the patient. The patient and/or family concurred with the proposed plan, giving informed consent.    After informed consent the patient was brought to the cath lab after appropriate IV hydration was begun and oral premedication was given. He was further sedated with fentanyl. He was prepped and draped in the usual manner. Using the modified Seldinger access technique, a 6 Beninese sheath was placed in the femoral artery. A left heart catheterization with coronary arteriography was performed. Findings are discussed below.    The decision was made to proceed with intervention. He received Heparin as per protocol. The details of the intervention are as  follows:    For the interventional procedure we used a JR4 guide catheter with a BMW guidewire to cross the lesion in the distal portion of the right coronary artery lesion is directly stented with a 3.0 x 15 mm Xience drug-eluting stent that was deployed at 14 jeromy with good angiographic result.  Patient tolerated the procedure very well with no immediate postprocedure complications plan to obtain hemostasis by manual pressure.  Procedural anticoagulation was heparin patient received 600 mg of Plavix at the end of the procedure.    After the procedure was completed, sedation was stopped and the sheaths and catheters were all removed according to established hospital protocols.    Conscious sedation:  Conscious sedation was performed according to protocol.  I supervised and directed an independent trained observer with the assistance of monitoring the patient's level of consciousness and physiologic status throughout the procedure.  Intraoperative service time was 90 minutes.    Findings:    Hemodynamics Aortic pressure systolic 166 diastolic 75 with a mean pressure of 110 mmHg  Left Main Large-caliber vessel angiographically normal bifurcates into left artery descending and left circumflex arteries  RCA Large-caliber vessel dominant vessel  Proximal right coronary artery has angiographic 30 to 40% stenosis  Mid right coronary artery has a patent stent with mild in-stent restenosis angiographically 10 to 20%  Distal right coronary artery has a focal area of angiographic 80 to 90% stenosis likely the culprit vessel for patient's symptoms of unstable angina  Right coronary artery provides a moderate caliber PDA and PLB branches that are angiographically normal  LAD Large-caliber vessel  LAD has a patent stent in the proximal and midportion  Distal edge of the LAD stent has angiographic 20 to 30% stenosis  Mid to distal LAD has another focal area of angiographic 50% stenosis  First diagonal branch of the LAD is a  moderate caliber vessel angiographically normal  Circ Moderate to large caliber vessel  First marginal branch of the left circumflex artery is almost like a ramus intermediate branch has a patent stent in the proximal portion  Proximal to the stent in the ostial portion of the ramus inferior branch there is angiographic 30 to 40% stenosis  Mid ramus intermediate branch after the stented segment has another focal area of angiographic 40% stenosis  Continuation of the left circumflex artery has a mid angiographic 30 to 40% stenosis  LV Not done  Coronary dominance Right coronary artery    Interventions/Vessels Successful PCI and stenting of the distal right coronary artery with placement of a drug-eluting stent  Guides/Wires BMW  Devices Xience drug-eluting stent 3.0 x 15 mm  Post % Stenosis  0  Pre-procedure GEORGINA flow  3  Post procedure GEORGINA flow  3  Closure Device None  Complications None    Estimated Blood Loss:  Minimal    Specimens: None    Complications:  None; patient tolerated the procedure well.    Disposition: PACU - hemodynamically stable.    Condition: stable    Impressions:  Severe single-vessel coronary artery disease involving the distal right coronary artery culprit lesion for the patient's symptoms of unstable angina successfully treated with placement of a Xience drug-eluting stent  Moderate disease involving the ramus intermediate branch and mid to distal LAD plan to conservatively manage with medical therapy  Patent stents in the LAD mid right coronary artery and also ramus intermediate branch of the left circumflex artery    Recommendations:  Aspirin 81 mg p.o. once a day  Plavix 75 mg p.o. once a day  Observe patient in PCU bed overnight  Test results reviewed and discussed with patient and family     No results found for this or any previous visit.     Lab Results   Component Value Date    GLUCOSE 162 (H) 12/24/2023    BUN 18 12/24/2023    CREATININE 0.78 12/24/2023    EGFR 96.5 12/24/2023     BCR 23.1 12/24/2023    K 3.3 (L) 12/24/2023    CO2 28.0 12/24/2023    CALCIUM 8.8 12/24/2023    ALBUMIN 4.4 09/20/2023    BILITOT 0.4 09/20/2023    AST 16 09/20/2023    ALT 22 09/20/2023      Lab Results   Component Value Date    CHOL 129 12/23/2023    CHLPL 126 11/26/2019    TRIG 134 12/23/2023    HDL 36 (L) 12/23/2023    LDL 69 12/23/2023      Lab Results   Component Value Date    CKTOTAL 84 10/03/2018    TROPONINT 12 12/22/2023        Assessment & Plan       Chest pain    Coronary artery disease involving native coronary artery of native heart    Mixed hyperlipidemia    Essential hypertension    Type 2 diabetes mellitus without complications    Pre-operative clearance    Vocal cord polyps    S/P coronary artery stent placement    S/P blane    Borderline diabetes    Preop cardiovascular exam        Alcira Garcia MD  12/24/23  10:13 EST

## 2023-12-24 NOTE — CASE MANAGEMENT/SOCIAL WORK
Continued Stay Note  EVE Delarosa     Patient Name: Codey Rhodes  MRN: [id number]  Today's Date: 12/24/2023    Admit Date: 12/22/2023        Discharge Plan       Row Name 12/24/23 0738       Plan    Plan Comments DC barriers: IV fluids, chest pain, cardiology following, plan for heart cath

## 2023-12-24 NOTE — PLAN OF CARE
Problem: Adult Inpatient Plan of Care  Goal: Plan of Care Review  12/24/2023 0257 by Love Lopes RN  Outcome: Ongoing, Progressing  12/24/2023 0256 by Love Lopes RN  Outcome: Ongoing, Progressing  Flowsheets (Taken 12/24/2023 0256)  Progress: no change  Plan of Care Reviewed With: patient  Goal: Patient-Specific Goal (Individualized)  12/24/2023 0257 by Love Lopes RN  Outcome: Ongoing, Progressing  12/24/2023 0256 by Love Lopes RN  Outcome: Ongoing, Progressing  Goal: Absence of Hospital-Acquired Illness or Injury  12/24/2023 0257 by Love Lopes RN  Outcome: Ongoing, Progressing  12/24/2023 0256 by Love Lopes RN  Outcome: Ongoing, Progressing  Intervention: Identify and Manage Fall Risk  Recent Flowsheet Documentation  Taken 12/23/2023 2200 by Love Lopes RN  Safety Promotion/Fall Prevention:   safety round/check completed   nonskid shoes/slippers when out of bed   lighting adjusted  Taken 12/23/2023 2000 by Love Lopes RN  Safety Promotion/Fall Prevention:   safety round/check completed   nonskid shoes/slippers when out of bed   lighting adjusted  Intervention: Prevent Skin Injury  Recent Flowsheet Documentation  Taken 12/23/2023 2000 by Love Lopes RN  Body Position: position changed independently  Intervention: Prevent and Manage VTE (Venous Thromboembolism) Risk  Recent Flowsheet Documentation  Taken 12/23/2023 2200 by Love Lopse RN  Activity Management:   ambulated to bathroom   up ad ky  Taken 12/23/2023 2000 by Love Lopes RN  Activity Management: activity encouraged  Intervention: Prevent Infection  Recent Flowsheet Documentation  Taken 12/23/2023 2200 by Love Lopes RN  Infection Prevention:   rest/sleep promoted   single patient room provided  Taken 12/23/2023 2000 by Love Lopes RN  Infection Prevention:   rest/sleep promoted   single patient room provided  Goal: Optimal Comfort and  Wellbeing  12/24/2023 0257 by Love Lopes RN  Outcome: Ongoing, Progressing  12/24/2023 0256 by Love Lopes RN  Outcome: Ongoing, Progressing  Goal: Readiness for Transition of Care  12/24/2023 0257 by Love Lopes RN  Outcome: Ongoing, Progressing  12/24/2023 0256 by Love Lopes RN  Outcome: Ongoing, Progressing     Problem: Diabetes Comorbidity  Goal: Blood Glucose Level Within Targeted Range  12/24/2023 0257 by Love Lopes RN  Outcome: Ongoing, Progressing  12/24/2023 0256 by Love Loeps RN  Outcome: Ongoing, Progressing     Problem: Hypertension Comorbidity  Goal: Blood Pressure in Desired Range  12/24/2023 0257 by Love Lopes RN  Outcome: Ongoing, Progressing  12/24/2023 0256 by Love Lopes RN  Outcome: Ongoing, Progressing  Intervention: Maintain Blood Pressure Management  Recent Flowsheet Documentation  Taken 12/23/2023 2200 by Love Lopes RN  Medication Review/Management: medications reviewed  Taken 12/23/2023 2000 by Love Lopes RN  Medication Review/Management: medications reviewed     Problem: Chest Pain  Goal: Resolution of Chest Pain Symptoms  12/24/2023 0257 by Love Lopes RN  Outcome: Ongoing, Progressing  12/24/2023 0256 by Love Lopes RN  Outcome: Ongoing, Progressing   Goal Outcome Evaluation:  Plan of Care Reviewed With: patient        Progress: no change  Outcome Evaluation: cardiology to see pt in am. n o c/o chest pain this evening

## 2023-12-24 NOTE — CONSULTS
[facility] Medicine Services  Consult Note    Patient Name: Codey Rhodes  : 1955  MRN: [id number]  Primary Care Physician:  Ta Islas MD  Referring Physician: Alcira Garcia, *  Date of admission: 2023  Date and Time of Care: 2023     Inpatient Hospitalist Consult  Consult performed by: Young Poon MD  Consult ordered by: Susan Montana APRN            Reason for Consult/ Chief Complaint: Medical management      Subjective:     History of Present Illness:   This is a 68 years old male with a past medical history of CAD status post 4 stents, hypertension, hyperlipidemia and diabetes who presented to the ED with complaints of chest pain, dyspnea on exertion and shortness of breath.  He was admitted for further workup and management given his cardiac history.    Patient was seen by cardiology, was noted to have an abnormal stress test and is now status post cardiac catheterization with PCI and 1 stent placed.  Patient tolerated procedure well and is laying comfortably.    Hospitalist service is consulted for further medical management    At time of my examination, patient was laying comfortably in bed, tired appearing, family present and in no acute distress.  He is hemodynamically stable and just returned back from the Cath Lab.    Vital signs showed a blood pressure of 143/70, heart rate 67 bpm, respiratory rate 17 and saturating well on room air.    Labs showed BMP within normal limits other than potassium of 3.3, CBC within normal limits.          Review of Systems:   Review of Systems    Personal History:     Past Medical History:   Diagnosis Date    Coronary artery disease involving native coronary artery of native heart 2016    PCI  PCI RCA 16 PCI to LAD 2018    Hypertension     Mixed hyperlipidemia 2019    Type 2 diabetes mellitus without complications 2019       Past Surgical History:   Procedure Laterality Date     CARDIAC CATHETERIZATION      CORONARY STENT PLACEMENT         Family History: family history includes COPD in his mother; Cancer in his mother; Heart disease in his mother; No Known Problems in his father. Otherwise pertinent FHx was reviewed and not pertinent to current issue.    Social History:  reports that he has been smoking cigarettes. He has been smoking an average of .25 packs per day. He has never used smokeless tobacco. He reports that he does not currently use alcohol. He reports that he does not use drugs.    Home Medications:   amLODIPine, aspirin, losartan, metoprolol succinate XL, nitroglycerin, pravastatin, and terazosin    Allergies:  Allergies   Allergen Reactions    Crestor [Rosuvastatin] Myalgia         Objective:     Vital Signs  Temp:  [97.5 °F (36.4 °C)-97.8 °F (36.6 °C)] 97.5 °F (36.4 °C)  Heart Rate:  [] 70  Resp:  [16-18] 17  BP: ()/() 154/77  Flow (L/min):  [2] 2   Body mass index is 37.04 kg/m².    Physical Exam  Physical Exam  Appearance: No apparent distress, non-toxic appearing   HEENT/Neck: Neck is supple, Extraocular movements intact, Moist mucous membranes,   Cardiovascular: Regular Rate and Rhythm,   Pulmonary: Clear to auscultation bilaterally anterior chest wall   Abdomen: BS+, Soft, non-tender, non-distended.   Ext: No Cyanosis, Clubbing, Edema.   Neuro: Non-focal, Alert & Oriented x 3,           Scheduled Meds   amLODIPine, 5 mg, Oral, Daily  [START ON 12/25/2023] aspirin, 81 mg, Oral, Daily  atorvastatin, 10 mg, Oral, Nightly  [START ON 12/25/2023] clopidogrel, 75 mg, Oral, Daily  insulin lispro, 2-9 Units, Subcutaneous, 4x Daily AC & at Bedtime  losartan, 100 mg, Oral, Nightly  metoprolol succinate XL, 50 mg, Oral, Nightly  senna-docusate sodium, 2 tablet, Oral, BID  sodium chloride, 10 mL, Intravenous, Q12H  terazosin, 5 mg, Oral, Nightly       PRN Meds     acetaminophen    aluminum-magnesium hydroxide-simethicone    atropine    senna-docusate sodium  **AND** polyethylene glycol **AND** bisacodyl **AND** bisacodyl    Calcium Replacement - Follow Nurse / BPA Driven Protocol    dextrose    dextrose    glucagon (human recombinant)    Magnesium Standard Dose Replacement - Follow Nurse / BPA Driven Protocol    Morphine    nitroglycerin    ondansetron    Phosphorus Replacement - Follow Nurse / BPA Driven Protocol    Potassium Replacement - Follow Nurse / BPA Driven Protocol    [COMPLETED] Insert Peripheral IV **AND** sodium chloride    sodium chloride    sodium chloride    sodium chloride   Infusions  nitroglycerin, 5-200 mcg/min, Last Rate: 5 mcg/min (12/24/23 0975)          Diagnostic Data    Results from last 7 days   Lab Units 12/24/23  0436   WBC 10*3/mm3 8.90   HEMOGLOBIN g/dL 13.4   HEMATOCRIT % 39.3   PLATELETS 10*3/mm3 235   GLUCOSE mg/dL 162*   CREATININE mg/dL 0.78   BUN mg/dL 18   SODIUM mmol/L 142   POTASSIUM mmol/L 3.3*   ANION GAP mmol/L 11.0       No radiology results for the last day      I reviewed the patient's new clinical results.    Assessment/Plan:   his is a 68 years old male with a past medical history of CAD status post 4 stents, hypertension, hyperlipidemia and diabetes who presented to the ED with complaints of chest pain, dyspnea on exertion and shortness of breath.  He was found to have an abnormal stress test and he is status post cardiac catheterization with 1 stent placed.        CAD status post PCI  -Has had 4 stents placed in the past, required 1 stent today.  -Cardiology following, appreciate recommendations.  -Continue aspirin, Plavix, atorvastatin, metoprolol and losartan.  -Continue to monitor.        Hypertension  -Blood pressure mildly evaded, will monitor for now, continue losartan and metoprolol.      Hyperlipidemia  Continue atorvastatin.  Might need to increase the dose given he is just on 10 mg        Diabetes mellitus  Blood glucose at goal, continue sliding scale insulin.        DVT prophylaxis: SCD  Full code  Continue  patient level of care  Plan discussed with patient, nurse and family.              Time: 30 minutes        Signature: Electronically signed by Young Poon MD, 12/24/23, 17:19 EST.  [facility] Hospitalist Team

## 2023-12-24 NOTE — PROGRESS NOTES
[facility] Observation Unit Progress Note    Patient Name: Codey Rhodes  : 1955  MRN: [id number]  Primary Care Physician: Ta Islas MD  Date of admission: 2023     Patient Care Team:  Ta Islas MD as PCP - General          Subjective   History Present Illness     Chief Complaint:   Chief Complaint   Patient presents with    Chest Pain     Chest Pain     Chest Pain         Chest Pain   Pertinent negatives include no irregular heartbeat, malaise/fatigue, nausea, near-syncope, palpitations, shortness of breath or vomiting.      HPI  ED 2023  Context: Chest tightness for the last 2 days that he states feels like it is similar to last time he had some blockages.  Took a nitro today with some relief.  Has had mild shortness of breath with any productive cough or fever.  Recently took up smoking again.  Denies any edema of his lower extremities.      Observation 2023  Patient agrees with HPI noted above including chest tightness for the past 2 days.  Reports history of CAD with cardiac stents a few years ago.  Patient currently asymptomatic.  Discussed abnormal stress test with patient as well as cardiology consultation.    Observation 2023  Patient denied any acute distress or complaints when seen by provider earlier this morning. Per nursing staff, heart cath required intervention. Patient moving to PCU level care. Will consult the hospitalist.     Review of Systems   Cardiovascular:  Positive for chest pain.     Review of Systems   Constitutional: Negative for malaise/fatigue.   Cardiovascular:  Positive for chest pain. Negative for irregular heartbeat, near-syncope and palpitations.   Respiratory:  Negative for shortness of breath.    Gastrointestinal:  Negative for nausea and vomiting.   All other systems reviewed and are negative.      Personal History     Past Medical History:   Past Medical History:   Diagnosis Date    Coronary artery disease involving native  coronary artery of native heart 12/8/2016    PCI 12/1/12006 PCI RCA 12/14/16 PCI to LAD 11/2018    Hypertension     Mixed hyperlipidemia 11/19/2019    Type 2 diabetes mellitus without complications 11/19/2019       Surgical History:      Past Surgical History:   Procedure Laterality Date    CARDIAC CATHETERIZATION      CORONARY STENT PLACEMENT             Family History: family history includes COPD in his mother; Cancer in his mother; Heart disease in his mother; No Known Problems in his father. Otherwise pertinent FHx was reviewed and unremarkable.     Social History:  reports that he has been smoking cigarettes. He has been smoking an average of .25 packs per day. He has never used smokeless tobacco. He reports that he does not currently use alcohol. He reports that he does not use drugs.      Medications:  Prior to Admission medications    Medication Sig Start Date End Date Taking? Authorizing Provider   amLODIPine (NORVASC) 10 MG tablet Take 1 tablet by mouth Daily. 3/22/23  Yes Kong Brennan MD   aspirin 81 MG EC tablet Take 1 tablet by mouth Daily.   Yes Kong Brennan MD   losartan (COZAAR) 100 MG tablet Take 1 tablet by mouth Every Night. 3/6/23  Yes Kong Brennan MD   metoprolol succinate XL (TOPROL-XL) 50 MG 24 hr tablet Take 1 tablet by mouth Daily. 11/25/22  Yes Kong Brennan MD   nitroglycerin (NITROSTAT) 0.4 MG SL tablet 1 under the tongue as needed for angina, may repeat q5mins for up three doses 6/28/21  Yes Pradip Mcneill MD   pravastatin (PRAVACHOL) 40 MG tablet Take 1 tablet by mouth Every Night. 5/18/18  Yes Kong Brennan MD   terazosin (HYTRIN) 5 MG capsule Take 1 capsule by mouth Every Night. 3/17/23  Yes Kong Brennan MD       Allergies:    Allergies   Allergen Reactions    Crestor [Rosuvastatin] Myalgia       Objective   Objective     Vital Signs  Temp:  [97.5 °F (36.4 °C)-97.9 °F (36.6 °C)] 97.5 °F (36.4 °C)  Heart Rate:  [68-90]  78  Resp:  [13-18] 16  BP: (109-176)/(69-97) 151/82  SpO2:  [95 %-100 %] 99 %  on  Flow (L/min):  [2] 2;   Device (Oxygen Therapy): room air  Body mass index is 37.04 kg/m².    Physical Exam  Vitals and nursing note reviewed.   Constitutional:       Appearance: Normal appearance. He is obese.   HENT:      Head: Normocephalic and atraumatic.      Right Ear: External ear normal.      Left Ear: External ear normal.      Nose: Nose normal.      Mouth/Throat:      Mouth: Mucous membranes are moist.   Eyes:      Extraocular Movements: Extraocular movements intact.   Cardiovascular:      Rate and Rhythm: Normal rate and regular rhythm.      Pulses: Normal pulses.      Heart sounds: Normal heart sounds.   Pulmonary:      Effort: Pulmonary effort is normal.      Breath sounds: Normal breath sounds.   Abdominal:      General: Abdomen is flat. Bowel sounds are normal.      Palpations: Abdomen is soft.   Musculoskeletal:         General: Normal range of motion.      Cervical back: Normal range of motion.   Skin:     General: Skin is warm.   Neurological:      Mental Status: He is alert and oriented to person, place, and time.   Psychiatric:         Behavior: Behavior normal.         Thought Content: Thought content normal.         Judgment: Judgment normal.         Results Review:  I have personally reviewed most recent lab results and radiology images and interpretations and agree with findings, most notably: Troponin, CMP, CBC, EKG and chest x-ray.       Results from last 7 days   Lab Units 12/24/23  0436 12/23/23  0449 12/22/23  1326   WBC 10*3/mm3 8.90   < > 8.50   HEMOGLOBIN g/dL 13.4   < > 14.4   HEMATOCRIT % 39.3   < > 42.5   PLATELETS 10*3/mm3 235   < > 265   INR   --   --  1.00    < > = values in this interval not displayed.     Results from last 7 days   Lab Units 12/24/23  0436 12/23/23  0449 12/22/23  1628 12/22/23  1326   SODIUM mmol/L 142   < >  --  138   POTASSIUM mmol/L 3.3*   < >  --  3.4*   CHLORIDE mmol/L 103    < >  --  99   CO2 mmol/L 28.0   < >  --  34.0*   BUN mg/dL 18   < >  --  17   CREATININE mg/dL 0.78   < >  --  0.92   GLUCOSE mg/dL 162*   < >  --  183*   CALCIUM mg/dL 8.8   < >  --  9.7   HSTROP T ng/L  --   --  12 13   PROBNP pg/mL  --   --   --  178.2    < > = values in this interval not displayed.     Estimated Creatinine Clearance: 108 mL/min (by C-G formula based on SCr of 0.78 mg/dL).  Brief Urine Lab Results  (Last result in the past 365 days)        Color   Clarity   Blood   Leuk Est   Nitrite   Protein   CREAT   Urine HCG        09/20/23 1119 Yellow   Cloudy   Large (3+)   Moderate (2+)   Negative   100 mg/dL (2+)                   Microbiology Results (last 10 days)       ** No results found for the last 240 hours. **            ECG/EMG Results (most recent)       Procedure Component Value Units Date/Time    ECG 12 Lead Chest Pain [[id number]] Collected: 12/22/23 1304     Updated: 12/22/23 1305     QT Interval 347 ms      QTC Interval 380 ms     Narrative:      HEART RATE= 72  bpm  RR Interval= 836  ms  WI Interval= 149  ms  P Horizontal Axis=   deg  P Front Axis= 65  deg  QRSD Interval= 81  ms  QT Interval= 347  ms  QTcB= 380  ms  QRS Axis= 51  deg  T Wave Axis= 70  deg  - NORMAL ECG -  Sinus rhythm  When compared with ECG of 24-Oct-2023 12:21:59,  No significant change  Electronically Signed By:   Date and Time of Study: 2023-12-22 13:04:42    SCANNED - TELEMETRY   [[id number]] Resulted: 12/22/23     Updated: 12/22/23 2033    SCANNED - TELEMETRY   [[id number]] Resulted: 12/22/23     Updated: 12/22/23 2049    SCANNED - TELEMETRY   [[id number]] Resulted: 12/22/23     Updated: 12/22/23 2311    SCANNED - TELEMETRY   [[id number]] Resulted: 12/22/23     Updated: 12/22/23 2311    SCANNED - TELEMETRY   [[id number]] Resulted: 12/22/23     Updated: 12/22/23 2355    SCANNED - TELEMETRY   [[id number]] Resulted: 12/22/23     Updated: 12/23/23 1819    SCANNED - TELEMETRY   [[id number]] Resulted: 12/22/23     Updated:  12/23/23 1819    SCANNED - TELEMETRY   [[id number]] Resulted: 12/22/23     Updated: 12/23/23 1946    SCANNED - TELEMETRY   [[id number]] Resulted: 12/22/23     Updated: 12/23/23 2113    SCANNED - TELEMETRY   [[id number]] Resulted: 12/22/23     Updated: 12/23/23 2248    SCANNED - TELEMETRY   [[id number]] Resulted: 12/22/23     Updated: 12/23/23 2248                    XR Chest 1 View    Result Date: 12/22/2023  Impression: No acute cardiopulmonary findings. Electronically Signed: Surya Rai MD  12/22/2023 1:57 PM EST  Workstation ID: [id number]       Estimated Creatinine Clearance: 108 mL/min (by C-G formula based on SCr of 0.78 mg/dL).    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            Chest pain            Lab Results   Component Value Date     TROPONINT 12 12/22/2023     TROPONINT 13 12/22/2023   -CMP and CBC generally unremarkable except hyperglycemia and mild hypokalemia  -Lipid panel unremarkable except HDL 36  -Chest X-ray: No acute cardiopulmonary findings  -EKG: Sinus rhythm with heart rate 72, VT interval 149 and   -In the ED pt given potassium per electrolyte protocol  -Stress Test showed mild inferior and apical ischemia  -Telemetry  -Cardiology consulted and recommended a heart cath  -Patient had a heart cath today and required intervention. Patient transferring to  PCU level care, according to nursing staff.   -Hospitalist consulted      Diabetes mellitus  -Moderately controlled   Lab Results   Component Value Date    GLUCOSE 162 (H) 12/24/2023    GLUCOSE 154 (H) 12/23/2023    GLUCOSE 183 (H) 12/22/2023    GLUCOSE 174 (H) 10/24/2023   -A1c 7.50  -Sliding scale insulin  -Diabetic diet  -Monitor AC and HS      Hypertension  -Moderately controlled   BP Readings from Last 1 Encounters:   12/24/23 147/89   - Continue losartan, and amlodipine and metoprolol  - Monitor while admitted      Obesity  -BMI 37.04  -Lifestyle modifications           VTE Prophylaxis -   Mechanical Order History:        Ordered        12/22/23 1635  Place Sequential Compression Device  Once            12/22/23 1635  Maintain Sequential Compression Device  Continuous                          Pharmalogical Order History:        Ordered     Dose Route Frequency Stop    12/24/23 0928  heparin (porcine) injection         -- -- Code / Trauma / Sedation Medication --    12/23/23 1548  [MAR Hold]  Enoxaparin Sodium (LOVENOX) syringe 40 mg        (MAR Hold since Sun 12/24/2023 at 0855.Hold Reason: Unreviewed Transfer Orders)    40 mg SC Daily --                    CODE STATUS:    Code Status and Medical Interventions:   Ordered at: 12/22/23 1626     Level Of Support Discussed With:    Patient     Code Status (Patient has no pulse and is not breathing):    CPR (Attempt to Resuscitate)     Medical Interventions (Patient has pulse or is breathing):    Full Support       This patient has been examined wearing personal protective equipment.     I discussed the patient's findings and my recommendations with patient and nursing staff.      Signature:Electronically signed by KEVIN Cedeno, 12/24/23, 11:25 AM EST.

## 2023-12-24 NOTE — PLAN OF CARE
Problem: Adult Inpatient Plan of Care  Goal: Plan of Care Review  Outcome: Ongoing, Progressing  Flowsheets (Taken 12/24/2023 0256)  Progress: no change  Plan of Care Reviewed With: patient  Goal: Patient-Specific Goal (Individualized)  Outcome: Ongoing, Progressing  Goal: Absence of Hospital-Acquired Illness or Injury  Outcome: Ongoing, Progressing  Intervention: Identify and Manage Fall Risk  Recent Flowsheet Documentation  Taken 12/23/2023 2200 by Love Lopes RN  Safety Promotion/Fall Prevention:   safety round/check completed   nonskid shoes/slippers when out of bed   lighting adjusted  Taken 12/23/2023 2000 by Love Lopes RN  Safety Promotion/Fall Prevention:   safety round/check completed   nonskid shoes/slippers when out of bed   lighting adjusted  Intervention: Prevent Skin Injury  Recent Flowsheet Documentation  Taken 12/23/2023 2000 by Love Lopes RN  Body Position: position changed independently  Intervention: Prevent and Manage VTE (Venous Thromboembolism) Risk  Recent Flowsheet Documentation  Taken 12/23/2023 2200 by Love Lopes RN  Activity Management:   ambulated to bathroom   up ad ky  Taken 12/23/2023 2000 by Love Lopes RN  Activity Management: activity encouraged  Intervention: Prevent Infection  Recent Flowsheet Documentation  Taken 12/23/2023 2200 by Love Lopes RN  Infection Prevention:   rest/sleep promoted   single patient room provided  Taken 12/23/2023 2000 by Love Lopes RN  Infection Prevention:   rest/sleep promoted   single patient room provided  Goal: Optimal Comfort and Wellbeing  Outcome: Ongoing, Progressing  Goal: Readiness for Transition of Care  Outcome: Ongoing, Progressing     Problem: Diabetes Comorbidity  Goal: Blood Glucose Level Within Targeted Range  Outcome: Ongoing, Progressing     Problem: Hypertension Comorbidity  Goal: Blood Pressure in Desired Range  Outcome: Ongoing, Progressing  Intervention: Maintain Blood  Pressure Management  Recent Flowsheet Documentation  Taken 12/23/2023 2200 by Love Lopes, RN  Medication Review/Management: medications reviewed  Taken 12/23/2023 2000 by Love Lopes, RN  Medication Review/Management: medications reviewed     Problem: Chest Pain  Goal: Resolution of Chest Pain Symptoms  Outcome: Ongoing, Progressing   Goal Outcome Evaluation:  Plan of Care Reviewed With: patient        Progress: no change  Outcome Evaluation: cardiology to see pt in am. n o c/o chest pain this evening

## 2023-12-25 ENCOUNTER — READMISSION MANAGEMENT (OUTPATIENT)
Dept: CALL CENTER | Facility: HOSPITAL | Age: 68
End: 2023-12-25
Payer: MEDICARE

## 2023-12-25 VITALS
HEIGHT: 68 IN | DIASTOLIC BLOOD PRESSURE: 76 MMHG | TEMPERATURE: 97.7 F | RESPIRATION RATE: 15 BRPM | BODY MASS INDEX: 36.45 KG/M2 | SYSTOLIC BLOOD PRESSURE: 151 MMHG | HEART RATE: 80 BPM | OXYGEN SATURATION: 97 % | WEIGHT: 240.52 LBS

## 2023-12-25 LAB
ANION GAP SERPL CALCULATED.3IONS-SCNC: 10 MMOL/L (ref 5–15)
BASOPHILS # BLD AUTO: 0 10*3/MM3 (ref 0–0.2)
BASOPHILS NFR BLD AUTO: 0.7 % (ref 0–1.5)
BUN SERPL-MCNC: 15 MG/DL (ref 8–23)
BUN/CREAT SERPL: 18.1 (ref 7–25)
CALCIUM SPEC-SCNC: 9.1 MG/DL (ref 8.6–10.5)
CHLORIDE SERPL-SCNC: 102 MMOL/L (ref 98–107)
CHOLEST SERPL-MCNC: 102 MG/DL (ref 0–200)
CO2 SERPL-SCNC: 27 MMOL/L (ref 22–29)
CREAT SERPL-MCNC: 0.83 MG/DL (ref 0.76–1.27)
DEPRECATED RDW RBC AUTO: 42.9 FL (ref 37–54)
EGFRCR SERPLBLD CKD-EPI 2021: 94.7 ML/MIN/1.73
EOSINOPHIL # BLD AUTO: 0.4 10*3/MM3 (ref 0–0.4)
EOSINOPHIL NFR BLD AUTO: 6.1 % (ref 0.3–6.2)
ERYTHROCYTE [DISTWIDTH] IN BLOOD BY AUTOMATED COUNT: 13.6 % (ref 12.3–15.4)
GLUCOSE BLDC GLUCOMTR-MCNC: 214 MG/DL (ref 70–105)
GLUCOSE SERPL-MCNC: 194 MG/DL (ref 65–99)
HCT VFR BLD AUTO: 37.4 % (ref 37.5–51)
HDLC SERPL-MCNC: 31 MG/DL (ref 40–60)
HGB BLD-MCNC: 12.5 G/DL (ref 13–17.7)
LDLC SERPL CALC-MCNC: 49 MG/DL (ref 0–100)
LDLC/HDLC SERPL: 1.53 {RATIO}
LYMPHOCYTES # BLD AUTO: 1.2 10*3/MM3 (ref 0.7–3.1)
LYMPHOCYTES NFR BLD AUTO: 16.1 % (ref 19.6–45.3)
MAGNESIUM SERPL-MCNC: 2 MG/DL (ref 1.6–2.4)
MCH RBC QN AUTO: 28.7 PG (ref 26.6–33)
MCHC RBC AUTO-ENTMCNC: 33.4 G/DL (ref 31.5–35.7)
MCV RBC AUTO: 85.7 FL (ref 79–97)
MONOCYTES # BLD AUTO: 0.5 10*3/MM3 (ref 0.1–0.9)
MONOCYTES NFR BLD AUTO: 6.5 % (ref 5–12)
NEUTROPHILS NFR BLD AUTO: 5.1 10*3/MM3 (ref 1.7–7)
NEUTROPHILS NFR BLD AUTO: 70.6 % (ref 42.7–76)
NRBC BLD AUTO-RTO: 0.1 /100 WBC (ref 0–0.2)
PHOSPHATE SERPL-MCNC: 2.7 MG/DL (ref 2.5–4.5)
PLATELET # BLD AUTO: 216 10*3/MM3 (ref 140–450)
PMV BLD AUTO: 8.2 FL (ref 6–12)
POTASSIUM SERPL-SCNC: 4.1 MMOL/L (ref 3.5–5.2)
QT INTERVAL: 373 MS
QTC INTERVAL: 407 MS
RBC # BLD AUTO: 4.36 10*6/MM3 (ref 4.14–5.8)
SODIUM SERPL-SCNC: 139 MMOL/L (ref 136–145)
TRIGL SERPL-MCNC: 118 MG/DL (ref 0–150)
VLDLC SERPL-MCNC: 22 MG/DL (ref 5–40)
WBC NRBC COR # BLD AUTO: 7.2 10*3/MM3 (ref 3.4–10.8)

## 2023-12-25 PROCEDURE — 82948 REAGENT STRIP/BLOOD GLUCOSE: CPT

## 2023-12-25 PROCEDURE — 83735 ASSAY OF MAGNESIUM: CPT | Performed by: INTERNAL MEDICINE

## 2023-12-25 PROCEDURE — 84100 ASSAY OF PHOSPHORUS: CPT | Performed by: INTERNAL MEDICINE

## 2023-12-25 PROCEDURE — 85025 COMPLETE CBC W/AUTO DIFF WBC: CPT | Performed by: INTERNAL MEDICINE

## 2023-12-25 PROCEDURE — 80061 LIPID PANEL: CPT | Performed by: INTERNAL MEDICINE

## 2023-12-25 PROCEDURE — 80048 BASIC METABOLIC PNL TOTAL CA: CPT | Performed by: INTERNAL MEDICINE

## 2023-12-25 PROCEDURE — 93005 ELECTROCARDIOGRAM TRACING: CPT | Performed by: INTERNAL MEDICINE

## 2023-12-25 RX ORDER — CLOPIDOGREL BISULFATE 75 MG/1
75 TABLET ORAL DAILY
Qty: 30 TABLET | Refills: 11 | Status: SHIPPED | OUTPATIENT
Start: 2023-12-26 | End: 2024-12-25

## 2023-12-25 RX ADMIN — AMLODIPINE BESYLATE 5 MG: 5 TABLET ORAL at 08:26

## 2023-12-25 RX ADMIN — Medication 10 ML: at 08:27

## 2023-12-25 RX ADMIN — CLOPIDOGREL BISULFATE 75 MG: 75 TABLET ORAL at 08:26

## 2023-12-25 RX ADMIN — ACETAMINOPHEN 650 MG: 325 TABLET, FILM COATED ORAL at 03:52

## 2023-12-25 RX ADMIN — ASPIRIN 81 MG: 81 TABLET, COATED ORAL at 08:26

## 2023-12-25 NOTE — OUTREACH NOTE
Prep Survey      Flowsheet Row Responses   [facility] facility patient discharged from? [place]   Is LACE score < 7 ? No   Eligibility Readm Mgmt   Discharge diagnosis Chest pain   Does the patient have one of the following disease processes/diagnoses(primary or secondary)? Other   Does the patient have Home health ordered? No   Is there a DME ordered? No   Prep survey completed? Yes            Earline MENA - Registered Nurse

## 2023-12-25 NOTE — PROGRESS NOTES
Cardiology [facility]      Patient Care Team:  Ta Islas MD as PCP - General    Cardiology assessment and plan        Chest pain/symptoms are concerning for unstable angina  Shortness of breath  Dyspnea on exertion  Coronary artery disease s/p coronary intervention  Essential hypertension  Hyperlipidemia  Diabetes is comorbidity  Obesity BMI greater than 35  Multivessel CAD, RCA and LAD with interventions 2016 2016 respectively     Tmax is 97.5 pulse is 73 respirations are 15 blood pressure is 150/70 sats are 96% on room air  Sodium is 139 potassium is 4.1 creatinine is 0.8 hemoglobin is 12.5  Twelve-lead EKG shows normal sinus rhythm with no acute ST changes normal QT interval  Cardiac catheterization status post PCI and stenting of the right coronary artery  Patient is clinically hemodynamically stable  Denies any new cardiac symptoms  Denies any chest pain  Ambulating without any further problems  Cath site at right groin looks good with normal tomorrow bleeding  Need for compliance with the dual antiblood therapy with aspirin and Plavix reviewed and discussed with patient  Current medications include aspirin 81 mg p.o. once a day Norvasc 5 mg p.o. once a day Lipitor 10 mg p.o. once a day losartan 100 mg p.o. once a day Toprol-XL 50 mg p.o. once a day patient is going to be on Plavix 75 mg p.o. once a day  Need for compliance with medical therapy close monitoring and follow-up reviewed and discussed with patient  Follow-up in the office in 2 weeks after discharge        Impression  ========  Lexiscan Cardiolite test showed mild inferior and apical ischemia.  Gated SPECT images revealed normal left ventricular size and contractility with ejection fraction of 66%    Impressions:  Severe single-vessel coronary artery disease involving the distal right coronary artery culprit lesion for the patient's symptoms of unstable angina successfully treated with placement of a Xience drug-eluting stent  Moderate disease  "involving the ramus intermediate branch and mid to distal LAD plan to conservatively manage with medical therapy  Patent stents in the LAD mid right coronary artery and also ramus intermediate branch of the left circumflex artery                 Chief Complaint: Chest pain and unstable angina    Subjective no new complaints    Interval History: No significant change in the overall status    History taken from: patient    Review of Systems:  Review of Systems   Constitutional: Negative for chills, decreased appetite and malaise/fatigue.   HENT:  Negative for congestion and nosebleeds.    Eyes:  Negative for blurred vision and double vision.   Cardiovascular:  Positive for chest pain and dyspnea on exertion. Negative for irregular heartbeat, leg swelling, near-syncope, orthopnea, palpitations and paroxysmal nocturnal dyspnea.   Respiratory:  Positive for shortness of breath. Negative for cough.    Hematologic/Lymphatic: Negative for adenopathy. Does not bruise/bleed easily.   Skin:  Negative for color change and rash.   Musculoskeletal:  Negative for back pain and joint pain.   Gastrointestinal:  Negative for bloating, abdominal pain, hematemesis and hematochezia.   Genitourinary:  Negative for flank pain and hematuria.   Neurological:  Negative for dizziness and focal weakness.   Psychiatric/Behavioral:  Negative for altered mental status. The patient does not have insomnia.        Objective    Vital Signs  Visit Vitals  /71   Pulse 73   Temp 97.7 °F (36.5 °C) (Oral)   Resp 15   Ht 172.7 cm (68\")   Wt 109 kg (240 lb 8.4 oz)   SpO2 96%   BMI 36.57 kg/m²     Oxygen Therapy  SpO2: 96 %  Pulse Oximetry Type: Continuous  Device (Oxygen Therapy): room air  Flow (L/min): 2  Flowsheet Rows      Flowsheet Row First Filed Value   Admission Height 172.7 cm (68\") Documented at 12/22/2023 1200   Admission Weight 110 kg (243 lb 9.7 oz) Documented at 12/22/2023 1258          Intake & Output (last 3 days)         12/22 " 0701  12/23 0700 12/23 0701  12/24 0700 12/24 0701  12/25 0700 12/25 0701  12/26 0700    P.O. 240 924 840     I.V. (mL/kg)   56 (0.5)     Total Intake(mL/kg) 240 (2.2) 924 (8.4) 896 (8.2)     Urine (mL/kg/hr)   650 (0.2)     Emesis/NG output   0     Stool   0     Total Output   650     Net +240 +924 +246             Urine Unmeasured Occurrence   3 x     Stool Unmeasured Occurrence   2 x     Emesis Unmeasured Occurrence   0 x           Lines, Drains & Airways       Active LDAs       Name Placement date Placement time Site Days    Peripheral IV 12/22/23 1326 Right Antecubital 12/22/23  1326  Antecubital  1    Arterial Sheath 6 Fr. Right Femoral 12/24/23  0917  Femoral  less than 1                    Physical Exam:  Constitutional:       Appearance: Well-developed.   Eyes:      Conjunctiva/sclera: Conjunctivae normal.      Pupils: Pupils are equal, round, and reactive to light.   HENT:      Head: Normocephalic and atraumatic.   Neck:      Thyroid: No thyromegaly.   Pulmonary:      Effort: Pulmonary effort is normal.      Breath sounds: Normal breath sounds.   Cardiovascular:      Normal rate. Regular rhythm.   Pulses:     Intact distal pulses.   Edema:     Peripheral edema absent.   Abdominal:      General: Bowel sounds are normal.      Palpations: Abdomen is soft.   Musculoskeletal:      Cervical back: Normal range of motion and neck supple. Skin:     General: Skin is warm.   Neurological:      Mental Status: Alert and oriented to person, place, and time.         Results Review:     I reviewed the patient's new clinical results.    Lab Results (last 24 hours)       Procedure Component Value Units Date/Time    Basic Metabolic Panel [[id number]]  (Abnormal) Collected: 12/25/23 0811    Specimen: Blood Updated: 12/25/23 0922     Glucose 194 mg/dL      BUN 15 mg/dL      Creatinine 0.83 mg/dL      Sodium 139 mmol/L      Potassium 4.1 mmol/L      Comment: Slight hemolysis detected by analyzer. Result may be falsely elevated.         Chloride 102 mmol/L      CO2 27.0 mmol/L      Calcium 9.1 mg/dL      BUN/Creatinine Ratio 18.1     Anion Gap 10.0 mmol/L      eGFR 94.7 mL/min/1.73     Narrative:      GFR Normal >60  Chronic Kidney Disease <60  Kidney Failure <15      Magnesium [[id number]]  (Normal) Collected: 12/25/23 0811    Specimen: Blood Updated: 12/25/23 0922     Magnesium 2.0 mg/dL     Phosphorus [[id number]]  (Normal) Collected: 12/25/23 0811    Specimen: Blood Updated: 12/25/23 0922     Phosphorus 2.7 mg/dL     CBC & Differential [[id number]]  (Abnormal) Collected: 12/25/23 0811    Specimen: Blood Updated: 12/25/23 0852    Narrative:      The following orders were created for panel order CBC & Differential.  Procedure                               Abnormality         Status                     ---------                               -----------         ------                     CBC Auto Differential[[id number]]        Abnormal            Final result                 Please view results for these tests on the individual orders.    CBC Auto Differential [[id number]]  (Abnormal) Collected: 12/25/23 0811    Specimen: Blood Updated: 12/25/23 0852     WBC 7.20 10*3/mm3      RBC 4.36 10*6/mm3      Hemoglobin 12.5 g/dL      Hematocrit 37.4 %      MCV 85.7 fL      MCH 28.7 pg      MCHC 33.4 g/dL      RDW 13.6 %      RDW-SD 42.9 fl      MPV 8.2 fL      Platelets 216 10*3/mm3      Neutrophil % 70.6 %      Lymphocyte % 16.1 %      Monocyte % 6.5 %      Eosinophil % 6.1 %      Basophil % 0.7 %      Neutrophils, Absolute 5.10 10*3/mm3      Lymphocytes, Absolute 1.20 10*3/mm3      Monocytes, Absolute 0.50 10*3/mm3      Eosinophils, Absolute 0.40 10*3/mm3      Basophils, Absolute 0.00 10*3/mm3      nRBC 0.1 /100 WBC     Lipid Panel [[id number]] Collected: 12/25/23 0811    Specimen: Blood Updated: 12/25/23 0847    POC Glucose Once [[id number]]  (Abnormal) Collected: 12/25/23 0825    Specimen: Blood Updated: 12/25/23 0826     Glucose 214 mg/dL       Comment: Serial Number: [id number]:  [id number]       POC Glucose Once [[id number]]  (Abnormal) Collected: 12/24/23 2318    Specimen: Blood Updated: 12/24/23 2319     Glucose 195 mg/dL      Comment: Serial Number: [id number]:  [id number]       POC Glucose Once [[id number]]  (Abnormal) Collected: 12/24/23 1615    Specimen: Blood Updated: 12/24/23 1616     Glucose 120 mg/dL      Comment: Serial Number: [id number]:  [id number]       POC Activated Clotting Time [[id number]]  (Abnormal) Collected: 12/24/23 1349    Specimen: Arterial Blood Updated: 12/24/23 1353     Activated Clotting Time  147 Seconds      Comment: Serial Number: [id number]:  [id number]             No results found for this or any previous visit.        Medication Review:   I have reviewed the patient's current medication list  Scheduled Meds:amLODIPine, 5 mg, Oral, Daily  aspirin, 81 mg, Oral, Daily  atorvastatin, 10 mg, Oral, Nightly  clopidogrel, 75 mg, Oral, Daily  insulin lispro, 2-9 Units, Subcutaneous, 4x Daily AC & at Bedtime  losartan, 100 mg, Oral, Nightly  metoprolol succinate XL, 50 mg, Oral, Nightly  senna-docusate sodium, 2 tablet, Oral, BID  sodium chloride, 10 mL, Intravenous, Q12H  terazosin, 5 mg, Oral, Nightly      Continuous Infusions:nitroglycerin, 5-200 mcg/min, Last Rate: Stopped (12/25/23 0842)      PRN Meds:.  acetaminophen    aluminum-magnesium hydroxide-simethicone    atropine    senna-docusate sodium **AND** polyethylene glycol **AND** bisacodyl **AND** bisacodyl    Calcium Replacement - Follow Nurse / BPA Driven Protocol    dextrose    dextrose    glucagon (human recombinant)    Magnesium Standard Dose Replacement - Follow Nurse / BPA Driven Protocol    Morphine    nitroglycerin    ondansetron    Phosphorus Replacement - Follow Nurse / BPA Driven Protocol    Potassium Replacement - Follow Nurse / BPA Driven Protocol    [COMPLETED] Insert Peripheral IV **AND** sodium chloride    sodium chloride    sodium  chloride    sodium chloride    ECG/EMG Results (last 24 hours)       Procedure Component Value Units Date/Time    SCANNED - TELEMETRY   [[id number]] Resulted: 12/22/23     Updated: 12/23/23 1819    SCANNED - TELEMETRY   [[id number]] Resulted: 12/22/23     Updated: 12/23/23 1819    SCANNED - TELEMETRY   [[id number]] Resulted: 12/22/23     Updated: 12/23/23 1946    SCANNED - TELEMETRY   [[id number]] Resulted: 12/22/23     Updated: 12/23/23 2113    SCANNED - TELEMETRY   [[id number]] Resulted: 12/22/23     Updated: 12/23/23 2248    SCANNED - TELEMETRY   [[id number]] Resulted: 12/22/23     Updated: 12/23/23 2248            Imaging Results (Last 24 Hours)       ** No results found for the last 24 hours. **          Results for orders placed during the hospital encounter of 12/22/23    Cardiac Catheterization/Vascular Study (Needs Review)  This result has not been signed. Information might be incomplete.    Narrative  Table formatting from the original result was not included.  Cardiac Catheterization with PCI Report    Codey Rhodes  [id number]  12/24/2023  @PCP@    He underwent cardiac catheterization and percutaneous coronary intervention.    Indications for the procedure include: acute coronary syndrome.  Patient presented with symptoms of unstable angina and abnormal stress test with refractory recurrent chest pain on maximal medical therapy    Procedure Details:  The risks, benefits, complications, treatment options, and expected outcomes were discussed with the patient. The patient and/or family concurred with the proposed plan, giving informed consent.    After informed consent the patient was brought to the cath lab after appropriate IV hydration was begun and oral premedication was given. He was further sedated with fentanyl. He was prepped and draped in the usual manner. Using the modified Seldinger access technique, a 6 Malagasy sheath was placed in the femoral artery. A left heart catheterization with coronary  arteriography was performed. Findings are discussed below.    The decision was made to proceed with intervention. He received Heparin as per protocol. The details of the intervention are as follows:    For the interventional procedure we used a JR4 guide catheter with a BMW guidewire to cross the lesion in the distal portion of the right coronary artery lesion is directly stented with a 3.0 x 15 mm Xience drug-eluting stent that was deployed at 14 jeromy with good angiographic result.  Patient tolerated the procedure very well with no immediate postprocedure complications plan to obtain hemostasis by manual pressure.  Procedural anticoagulation was heparin patient received 600 mg of Plavix at the end of the procedure.    After the procedure was completed, sedation was stopped and the sheaths and catheters were all removed according to established hospital protocols.    Conscious sedation:  Conscious sedation was performed according to protocol.  I supervised and directed an independent trained observer with the assistance of monitoring the patient's level of consciousness and physiologic status throughout the procedure.  Intraoperative service time was 90 minutes.    Findings:    Hemodynamics Aortic pressure systolic 166 diastolic 75 with a mean pressure of 110 mmHg  Left Main Large-caliber vessel angiographically normal bifurcates into left artery descending and left circumflex arteries  RCA Large-caliber vessel dominant vessel  Proximal right coronary artery has angiographic 30 to 40% stenosis  Mid right coronary artery has a patent stent with mild in-stent restenosis angiographically 10 to 20%  Distal right coronary artery has a focal area of angiographic 80 to 90% stenosis likely the culprit vessel for patient's symptoms of unstable angina  Right coronary artery provides a moderate caliber PDA and PLB branches that are angiographically normal  LAD Large-caliber vessel  LAD has a patent stent in the proximal and  midportion  Distal edge of the LAD stent has angiographic 20 to 30% stenosis  Mid to distal LAD has another focal area of angiographic 50% stenosis  First diagonal branch of the LAD is a moderate caliber vessel angiographically normal  Circ Moderate to large caliber vessel  First marginal branch of the left circumflex artery is almost like a ramus intermediate branch has a patent stent in the proximal portion  Proximal to the stent in the ostial portion of the ramus inferior branch there is angiographic 30 to 40% stenosis  Mid ramus intermediate branch after the stented segment has another focal area of angiographic 40% stenosis  Continuation of the left circumflex artery has a mid angiographic 30 to 40% stenosis  LV Not done  Coronary dominance Right coronary artery    Interventions/Vessels Successful PCI and stenting of the distal right coronary artery with placement of a drug-eluting stent  Guides/Wires BMW  Devices Xience drug-eluting stent 3.0 x 15 mm  Post % Stenosis  0  Pre-procedure GEORGINA flow  3  Post procedure GEORGINA flow  3  Closure Device None  Complications None    Estimated Blood Loss:  Minimal    Specimens: None    Complications:  None; patient tolerated the procedure well.    Disposition: PACU - hemodynamically stable.    Condition: stable    Impressions:  Severe single-vessel coronary artery disease involving the distal right coronary artery culprit lesion for the patient's symptoms of unstable angina successfully treated with placement of a Xience drug-eluting stent  Moderate disease involving the ramus intermediate branch and mid to distal LAD plan to conservatively manage with medical therapy  Patent stents in the LAD mid right coronary artery and also ramus intermediate branch of the left circumflex artery    Recommendations:  Aspirin 81 mg p.o. once a day  Plavix 75 mg p.o. once a day  Observe patient in PCU bed overnight  Test results reviewed and discussed with patient and family     No results  found for this or any previous visit.     Lab Results   Component Value Date    GLUCOSE 194 (H) 12/25/2023    BUN 15 12/25/2023    CREATININE 0.83 12/25/2023    EGFR 94.7 12/25/2023    BCR 18.1 12/25/2023    K 4.1 12/25/2023    CO2 27.0 12/25/2023    CALCIUM 9.1 12/25/2023    ALBUMIN 4.4 09/20/2023    BILITOT 0.4 09/20/2023    AST 16 09/20/2023    ALT 22 09/20/2023      Lab Results   Component Value Date    CHOL 129 12/23/2023    CHLPL 126 11/26/2019    TRIG 134 12/23/2023    HDL 36 (L) 12/23/2023    LDL 69 12/23/2023      Lab Results   Component Value Date    CKTOTAL 84 10/03/2018    TROPONINT 12 12/22/2023        Assessment & Plan       Chest pain    Coronary artery disease involving native coronary artery of native heart    Mixed hyperlipidemia    Essential hypertension    Type 2 diabetes mellitus without complications    Pre-operative clearance    Vocal cord polyps    S/P coronary artery stent placement    S/P blane    Borderline diabetes    Preop cardiovascular exam        Alcira Garcia MD  12/25/23  09:45 EST

## 2023-12-25 NOTE — DISCHARGE SUMMARY
[facility] Medicine Services  Discharge Summary    Date of Service: 2023  Patient Name: Codey Rhodes  : 1955  MRN: [id number]    Date of Admission: 2023  Discharge Diagnosis:   79 status post PCI  Hypertension  Hyperlipidemia  Diabetes    Date of Discharge:  2023  Primary Care Physician: Ta Islas MD      Presenting Problem:   Chest pain [R07.9]  Chest pain, unspecified type [R07.9]    Active and Resolved Hospital Problems:  Active Hospital Problems    Diagnosis POA    **Chest pain [R07.9] Yes    Preop cardiovascular exam [Z01.810] Not Applicable    Pre-operative clearance [Z01.818] Not Applicable    Type 2 diabetes mellitus without complications [E11.9] Yes    Essential hypertension [I10] Yes    Mixed hyperlipidemia [E78.2] Yes    Coronary artery disease involving native coronary artery of native heart [I25.10] Yes    Borderline diabetes [R73.03] Yes    S/P blane [Z90.49] Not Applicable    S/P coronary artery stent placement [Z95.5] Not Applicable    Vocal cord polyps [J38.1] Yes      Resolved Hospital Problems   No resolved problems to display.         Hospital Course   Hospital Course:  This is a 68 years old male with a past medical history of CAD status post 4 stents, hypertension, hyperlipidemia and diabetes who presented to the ED with complaints of chest pain, dyspnea on exertion and shortness of breath.  He was admitted for further workup and management given his cardiac history.     Patient was seen by cardiology, was noted to have an abnormal stress test and is now status post cardiac catheterization with PCI and 1 stent placed.  Patient tolerated procedure well and is laying comfortably.     He was monitored overnight, remained hemodynamically stable and is been discharged home.  Patient is to continue Plavix uninterruptedly for minimum of 1 year, aspirin and follow-up with cardiology in 3 weeks.      Plavix was sent to patient's pharmacy which is  closed today, he however got his today's dose of Plavix in the hospital and he is to  his Plavix for stent tomorrow morning and take tomorrow's dose.  Patient showed understanding and is in agreement with the plan.          DISCHARGE Follow Up Recommendations for labs and diagnostics:   None      Reasons For Change In Medications and Indications for New Medications:      Day of Discharge     Vital Signs:  Temp:  [97.4 °F (36.3 °C)-98.2 °F (36.8 °C)] 97.7 °F (36.5 °C)  Heart Rate:  [] 80  Resp:  [14-20] 15  BP: ()/() 151/76    Physical Exam:  Appearance: No apparent distress, non-toxic appearing   HEENT/Neck: Neck is supple, Extraocular movements intact, Moist mucous membranes,   Cardiovascular: Regular Rate and Rhythm,   Pulmonary: Clear to auscultation bilaterally anterior chest wall   Abdomen: BS+, Soft, non-tender, non-distended.   Ext: No Cyanosis, Clubbing, Edema.   Neuro: Non-focal, Alert & Oriented x 3,       Pertinent  and/or Most Recent Results     LAB RESULTS:      Lab 12/25/23  0811 12/24/23  0436 12/23/23  0449 12/22/23  1326   WBC 7.20 8.90 8.00 8.50   HEMOGLOBIN 12.5* 13.4 14.2 14.4   HEMATOCRIT 37.4* 39.3 42.2 42.5   PLATELETS 216 235 256 265   NEUTROS ABS 5.10 6.30 5.30 6.10   LYMPHS ABS 1.20 1.50 1.80 1.40   MONOS ABS 0.50 0.60 0.50 0.50   EOS ABS 0.40 0.40 0.50* 0.40   MCV 85.7 85.9 85.0 84.7   PROTIME  --   --   --  10.9   APTT  --   --   --  25.8*         Lab 12/25/23  0811 12/24/23  0436 12/23/23  0449 12/22/23  1326   SODIUM 139 142 139 138   POTASSIUM 4.1 3.3* 3.6 3.4*   CHLORIDE 102 103 100 99   CO2 27.0 28.0 29.0 34.0*   ANION GAP 10.0 11.0 10.0 5.0   BUN 15 18 17 17   CREATININE 0.83 0.78 0.77 0.92   EGFR 94.7 96.5 96.9 90.0   GLUCOSE 194* 162* 154* 183*   CALCIUM 9.1 8.8 9.2 9.7   MAGNESIUM 2.0  --   --  1.9   PHOSPHORUS 2.7  --   --   --    HEMOGLOBIN A1C  --   --  7.50*  --    TSH  --   --   --  0.894             Lab 12/22/23  1628 12/22/23  1326   PROBNP  --   178.2   HSTROP T 12 13   PROTIME  --  10.9   INR  --  1.00         Lab 12/25/23  0811 12/23/23  0449   CHOLESTEROL 102 129   LDL CHOL 49 69   HDL CHOL 31* 36*   TRIGLYCERIDES 118 134             Brief Urine Lab Results  (Last result in the past 365 days)        Color   Clarity   Blood   Leuk Est   Nitrite   Protein   CREAT   Urine HCG        09/20/23 1119 Yellow   Cloudy   Large (3+)   Moderate (2+)   Negative   100 mg/dL (2+)                 Microbiology Results (last 10 days)       ** No results found for the last 240 hours. **            XR Chest 1 View    Result Date: 12/22/2023  Impression: Impression: No acute cardiopulmonary findings. Electronically Signed: Surya Rai MD  12/22/2023 1:57 PM EST  Workstation ID: [id number]           Labs Pending at Discharge:  None      Procedures Performed  Procedure(s):  LEFT HEART CATH with possible PCI         Consults:   Consults       Date and Time Order Name Status Description    12/24/2023 11:40 AM Inpatient Hospitalist Consult Completed     12/22/2023  4:35 PM Inpatient Cardiology Consult                Discharge Details        Discharge Medications        New Medications        Instructions Start Date   clopidogrel 75 MG tablet  Commonly known as: PLAVIX   75 mg, Oral, Daily   Start Date: December 26, 2023            Continue These Medications        Instructions Start Date   amLODIPine 10 MG tablet  Commonly known as: NORVASC   10 mg, Oral, Daily      aspirin 81 MG EC tablet   81 mg, Oral, Daily      losartan 100 MG tablet  Commonly known as: COZAAR   100 mg, Oral, Nightly      metoprolol succinate XL 50 MG 24 hr tablet  Commonly known as: TOPROL-XL   1 tablet, Oral, Daily      nitroglycerin 0.4 MG SL tablet  Commonly known as: NITROSTAT   1 under the tongue as needed for angina, may repeat q5mins for up three doses      pravastatin 40 MG tablet  Commonly known as: PRAVACHOL   40 mg, Oral, Nightly      terazosin 5 MG capsule  Commonly known as: HYTRIN   5 mg, Oral,  Nightly               Allergies   Allergen Reactions    Crestor [Rosuvastatin] Myalgia         Discharge Disposition:     Home or Self Care    Diet:  Hospital:  Diet Order   Procedures    Diet: Cardiac Diets; Healthy Heart (2-3 Na+); Texture: Regular Texture (IDDSI 7); Fluid Consistency: Thin (IDDSI 0)         Discharge Activity:   Activity Instructions       Activity as Tolerated                CODE STATUS:  Code Status and Medical Interventions:   Ordered at: 12/22/23 1626     Level Of Support Discussed With:    Patient     Code Status (Patient has no pulse and is not breathing):    CPR (Attempt to Resuscitate)     Medical Interventions (Patient has pulse or is breathing):    Full Support         Future Appointments   Date Time Provider Department Center   3/12/2024  1:30 PM Zak Cantu MD [facility]       Additional Instructions for the Follow-ups that You Need to Schedule       Discharge Follow-up with PCP   As directed       Currently Documented PCP:    Ta Islas MD    PCP Phone Number:    [phone]     Follow Up Details: PCP follow-up        Discharge Follow-up with Specified Provider: Follow-up with cardiology in 3 weeks; 3 Weeks   As directed      To: Follow-up with cardiology in 3 weeks   Follow Up: 3 Weeks                Time spent on Discharge including face to face service:  >30 minutes    Signature: Electronically signed by Young Poon MD, 12/25/23, 10:38 EST.  [facility] Hospitalist Team

## 2023-12-26 NOTE — PAYOR COMM NOTE
"Codey Rhodes (68 y.o. Male)  1955  Policy no. [id number]   Requesting IP Auth for ER medical Admission  AUTHORIZATION PENDING  PLEASE FORWARD DETERMINATION TO FOLLOWING CONTACT:    TJ MCKEON LPN UR  Utilization Review Nurse  [facility]  Direct & confidential phone # [phone]  Fax # [phone]        Date of Birth   1955    Social Security Number       Address   [address] [place] [address]    Home Phone   [phone]    MRN   [id number]       Amish   None    Marital Status                               Admission Date   23    Admission Type   Emergency    Admitting Provider   Young Poon MD    Attending Provider       Department, Room/Bed   [facility] CARDIOVASCULAR CARE UNIT,        Discharge Date   2023    Discharge Disposition   Home or Self Care    Discharge Destination                                 Attending Provider: (none)   Allergies: Crestor [Rosuvastatin]    Isolation: None   Infection: None   Code Status: Prior    Ht: 172.7 cm (68\")   Wt: 109 kg (240 lb 8.4 oz)    Admission Cmt: None   Principal Problem: Chest pain [R07.9]                   Active Insurance as of 2023       Primary Coverage       Payor Plan Insurance Group Employer/Plan Group    ANTHEM MEDICARE REPLACEMENT ANTHEM MEDICARE ADVANTAGE [id number]       Payor Plan Address Payor Plan Phone Number Payor Plan Fax Number Effective Dates    [address] [phone]  2020 - None Entered    [facility] [address]         Subscriber Name Subscriber Birth Date Member ID       CODEY RHODES 1955 [id number]                     Emergency Contacts        (Rel.) Home Phone Work Phone Mobile Phone    Priscilla Rhodes (Spouse) -- -- [phone]    JUSTINA RHODES (Son) -- -- [phone]                 History & Physical        TripureSusan APRN at 23 1407       Attestation signed by Surya Xie MD at " 23 1316    Agree with NP H&P physical exam and treatment plan                [facility] H&P    Patient Name: Codey Rhodes  : 1955  MRN: [id number]  Primary Care Physician: Ta Islas MD  Date of admission: 2023     Patient Care Team:  Ta Islas MD as PCP - General          Subjective  History Present Illness     Chief Complaint:   Chief Complaint   Patient presents with    Chest Pain     Chest Pain     Chest Pain   Pertinent negatives include no irregular heartbeat, malaise/fatigue, nausea, near-syncope, palpitations, shortness of breath or vomiting.     HPI  ED 2023  Context: Chest tightness for the last 2 days that he states feels like it is similar to last time he had some blockages.  Took a nitro today with some relief.  Has had mild shortness of breath with any productive cough or fever.  Recently took up smoking again.  Denies any edema of his lower extremities.      Observation 2023  Patient agrees with HPI noted above including chest tightness for the past 2 days.  Reports history of CAD with cardiac stents a few years ago.  Patient currently asymptomatic.  Discussed abnormal stress test with patient as well as cardiology consultation.    Review of Systems   Constitutional: Negative for malaise/fatigue.   Cardiovascular:  Positive for chest pain. Negative for irregular heartbeat, near-syncope and palpitations.   Respiratory:  Negative for shortness of breath.    Gastrointestinal:  Negative for nausea and vomiting.   All other systems reviewed and are negative.          Personal History     Past Medical History:   Past Medical History:   Diagnosis Date    Coronary artery disease involving native coronary artery of native heart 2016    PCI  PCI RCA 16 PCI to LAD 2018    Hypertension     Mixed hyperlipidemia 2019    Type 2 diabetes mellitus without complications 2019       Surgical History:      Past Surgical  History:   Procedure Laterality Date    CARDIAC CATHETERIZATION      CORONARY STENT PLACEMENT             Family History: family history includes COPD in his mother; Cancer in his mother; Heart disease in his mother; No Known Problems in his father. Otherwise pertinent FHx was reviewed and unremarkable.     Social History:  reports that he has been smoking cigarettes. He has been smoking an average of .25 packs per day. He has never used smokeless tobacco. He reports that he does not currently use alcohol. He reports that he does not use drugs.      Medications:  Prior to Admission medications    Medication Sig Start Date End Date Taking? Authorizing Provider   amLODIPine (NORVASC) 10 MG tablet Take 1 tablet by mouth Daily. 3/22/23  Yes Kong Brennan MD   aspirin 81 MG EC tablet Take 1 tablet by mouth Daily.   Yes Kong Brennan MD   losartan (COZAAR) 100 MG tablet Take 1 tablet by mouth Every Night. 3/6/23  Yes Kong Brennan MD   metoprolol succinate XL (TOPROL-XL) 50 MG 24 hr tablet Take 1 tablet by mouth Daily. 11/25/22  Yes Kong Brennan MD   nitroglycerin (NITROSTAT) 0.4 MG SL tablet 1 under the tongue as needed for angina, may repeat q5mins for up three doses 6/28/21  Yes Pradip Mcneill MD   pravastatin (PRAVACHOL) 40 MG tablet Take 1 tablet by mouth Every Night. 5/18/18  Yes Kong Brennan MD   terazosin (HYTRIN) 5 MG capsule Take 1 capsule by mouth Every Night. 3/17/23  Yes ProviderKong MD       Allergies:    Allergies   Allergen Reactions    Crestor [Rosuvastatin] Myalgia       Objective  Objective     Vital Signs  Temp:  [97.5 °F (36.4 °C)-98.9 °F (37.2 °C)] 98.1 °F (36.7 °C)  Heart Rate:  [68-88] 88  Resp:  [14-20] 20  BP: (128-196)/(69-90) 156/81  SpO2:  [95 %-100 %] 99 %  on   ;   Device (Oxygen Therapy): room air  Body mass index is 37.04 kg/m².    Physical Exam  Vitals and nursing note reviewed.   Constitutional:       Appearance: Normal  appearance. He is obese.      Comments: Patient's voice is raspy.  He denies any respiratory symptoms.  He states he has polyps in his vocal cords.   HENT:      Head: Normocephalic and atraumatic.      Right Ear: External ear normal.      Left Ear: External ear normal.      Nose: Nose normal.      Mouth/Throat:      Mouth: Mucous membranes are moist.   Eyes:      Extraocular Movements: Extraocular movements intact.   Cardiovascular:      Rate and Rhythm: Normal rate and regular rhythm.      Pulses: Normal pulses.      Heart sounds: Normal heart sounds.   Pulmonary:      Effort: Pulmonary effort is normal.      Breath sounds: Normal breath sounds.   Abdominal:      General: Abdomen is flat. Bowel sounds are normal.      Palpations: Abdomen is soft.   Musculoskeletal:         General: Normal range of motion.      Cervical back: Normal range of motion.   Skin:     General: Skin is warm.   Neurological:      Mental Status: He is alert and oriented to person, place, and time.   Psychiatric:         Behavior: Behavior normal.         Thought Content: Thought content normal.         Judgment: Judgment normal.         Results Review:  I have personally reviewed most recent cardiac tracings, lab results, and radiology images and interpretations and agree with findings, most notably: Troponin, CMP, CBC, EKG and chest x-ray.    Results from last 7 days   Lab Units 12/23/23  0449 12/22/23  1326   WBC 10*3/mm3 8.00 8.50   HEMOGLOBIN g/dL 14.2 14.4   HEMATOCRIT % 42.2 42.5   PLATELETS 10*3/mm3 256 265   INR   --  1.00     Results from last 7 days   Lab Units 12/23/23  0449 12/22/23  1628 12/22/23  1326   SODIUM mmol/L 139  --  138   POTASSIUM mmol/L 3.6  --  3.4*   CHLORIDE mmol/L 100  --  99   CO2 mmol/L 29.0  --  34.0*   BUN mg/dL 17  --  17   CREATININE mg/dL 0.77  --  0.92   GLUCOSE mg/dL 154*  --  183*   CALCIUM mg/dL 9.2  --  9.7   HSTROP T ng/L  --  12 13   PROBNP pg/mL  --   --  178.2     Estimated Creatinine Clearance:  109.4 mL/min (by C-G formula based on SCr of 0.77 mg/dL).  Brief Urine Lab Results  (Last result in the past 365 days)        Color   Clarity   Blood   Leuk Est   Nitrite   Protein   CREAT   Urine HCG        09/20/23 1119 Yellow   Cloudy   Large (3+)   Moderate (2+)   Negative   100 mg/dL (2+)                   Microbiology Results (last 10 days)       ** No results found for the last 240 hours. **            ECG/EMG Results (most recent)       Procedure Component Value Units Date/Time    ECG 12 Lead Chest Pain [[id number]] Collected: 12/22/23 1304     Updated: 12/22/23 1305     QT Interval 347 ms      QTC Interval 380 ms     Narrative:      HEART RATE= 72  bpm  RR Interval= 836  ms  NH Interval= 149  ms  P Horizontal Axis=   deg  P Front Axis= 65  deg  QRSD Interval= 81  ms  QT Interval= 347  ms  QTcB= 380  ms  QRS Axis= 51  deg  T Wave Axis= 70  deg  - NORMAL ECG -  Sinus rhythm  When compared with ECG of 24-Oct-2023 12:21:59,  No significant change  Electronically Signed By:   Date and Time of Study: 2023-12-22 13:04:42    SCANNED - TELEMETRY   [[id number]] Resulted: 12/22/23     Updated: 12/22/23 2033    SCANNED - TELEMETRY   [[id number]] Resulted: 12/22/23     Updated: 12/22/23 2049    SCANNED - TELEMETRY   [[id number]] Resulted: 12/22/23     Updated: 12/22/23 2311    SCANNED - TELEMETRY   [[id number]] Resulted: 12/22/23     Updated: 12/22/23 2311    SCANNED - TELEMETRY   [[id number]] Resulted: 12/22/23     Updated: 12/22/23 2355                    XR Chest 1 View    Result Date: 12/22/2023  Impression: No acute cardiopulmonary findings. Electronically Signed: Surya Rai MD  12/22/2023 1:57 PM EST  Workstation ID: [id number]       Estimated Creatinine Clearance: 109.4 mL/min (by C-G formula based on SCr of 0.77 mg/dL).    Assessment & Plan  Assessment/Plan       Active Hospital Problems    Diagnosis  POA    **Chest pain [R07.9]  Yes      Resolved Hospital Problems   No resolved problems to display.        Chest  pain        Lab Results   Component Value Date     TROPONINT 12 12/22/2023     TROPONINT 13 12/22/2023   -CMP and CBC generally unremarkable except hyperglycemia and mild hypokalemia  -Lipid panel unremarkable except HDL 36  -Chest X-ray: No acute cardiopulmonary findings  -EKG: Sinus rhythm with heart rate 72, VA interval 149 and   -In the ED pt given potassium per electrolyte protocol  -Stress Test showed mild inferior and apical ischemia  -Telemetry  -Cardiology consulted   -N.p.o. at midnight     Diabetes mellitus  -Moderately controlled         Lab Results   Component Value Date     GLUCOSE 154 (H) 12/23/2023     GLUCOSE 183 (H) 12/22/2023     GLUCOSE 174 (H) 10/24/2023     GLUCOSE 183 (H) 09/20/2023   -A1c 7.50  -Sliding scale insulin  -Diabetic diet  -Monitor AC and HS      Hypertension  -Moderately controlled       BP Readings from Last 1 Encounters:   12/23/23 145/90   - Continue losartan, and amlodipine and metoprolol  - Monitor while admitted      Obesity  -BMI 37.04  -Lifestyle modifications           VTE Prophylaxis -   Mechanical Order History:        Ordered        12/22/23 1635  Place Sequential Compression Device  Once            12/22/23 1635  Maintain Sequential Compression Device  Continuous                          Pharmalogical Order History:       None            CODE STATUS:    Code Status and Medical Interventions:   Ordered at: 12/22/23 1626     Level Of Support Discussed With:    Patient     Code Status (Patient has no pulse and is not breathing):    CPR (Attempt to Resuscitate)     Medical Interventions (Patient has pulse or is breathing):    Full Support       This patient has been examined wearing personal protective equipment.     I discussed the patient's findings and my recommendations with patient and nursing staff.      Signature:Electronically signed by KEVIN Cedeno, 12/23/23, 2:07 PM EST.             Electronically signed by Surya Xie MD at 12/24/23 7199           Physician Progress Notes (last 48 hours)        Alcira Garcia MD at 12/25/23 2503          Cardiology [facility]      Patient Care Team:  Ta Islas MD as PCP - General    Cardiology assessment and plan        Chest pain/symptoms are concerning for unstable angina  Shortness of breath  Dyspnea on exertion  Coronary artery disease s/p coronary intervention  Essential hypertension  Hyperlipidemia  Diabetes is comorbidity  Obesity BMI greater than 35  Multivessel CAD, RCA and LAD with interventions 2016 2016 respectively     Tmax is 97.5 pulse is 73 respirations are 15 blood pressure is 150/70 sats are 96% on room air  Sodium is 139 potassium is 4.1 creatinine is 0.8 hemoglobin is 12.5  Twelve-lead EKG shows normal sinus rhythm with no acute ST changes normal QT interval  Cardiac catheterization status post PCI and stenting of the right coronary artery  Patient is clinically hemodynamically stable  Denies any new cardiac symptoms  Denies any chest pain  Ambulating without any further problems  Cath site at right groin looks good with normal tomorrow bleeding  Need for compliance with the dual antiblood therapy with aspirin and Plavix reviewed and discussed with patient  Current medications include aspirin 81 mg p.o. once a day Norvasc 5 mg p.o. once a day Lipitor 10 mg p.o. once a day losartan 100 mg p.o. once a day Toprol-XL 50 mg p.o. once a day patient is going to be on Plavix 75 mg p.o. once a day  Need for compliance with medical therapy close monitoring and follow-up reviewed and discussed with patient  Follow-up in the office in 2 weeks after discharge        Impression  ========  Lexiscan Cardiolite test showed mild inferior and apical ischemia.  Gated SPECT images revealed normal left ventricular size and contractility with ejection fraction of 66%    Impressions:  Severe single-vessel coronary artery disease involving the distal right coronary artery culprit lesion for the patient's  "symptoms of unstable angina successfully treated with placement of a Xience drug-eluting stent  Moderate disease involving the ramus intermediate branch and mid to distal LAD plan to conservatively manage with medical therapy  Patent stents in the LAD mid right coronary artery and also ramus intermediate branch of the left circumflex artery                 Chief Complaint: Chest pain and unstable angina    Subjective no new complaints    Interval History: No significant change in the overall status    History taken from: patient    Review of Systems:  Review of Systems   Constitutional: Negative for chills, decreased appetite and malaise/fatigue.   HENT:  Negative for congestion and nosebleeds.    Eyes:  Negative for blurred vision and double vision.   Cardiovascular:  Positive for chest pain and dyspnea on exertion. Negative for irregular heartbeat, leg swelling, near-syncope, orthopnea, palpitations and paroxysmal nocturnal dyspnea.   Respiratory:  Positive for shortness of breath. Negative for cough.    Hematologic/Lymphatic: Negative for adenopathy. Does not bruise/bleed easily.   Skin:  Negative for color change and rash.   Musculoskeletal:  Negative for back pain and joint pain.   Gastrointestinal:  Negative for bloating, abdominal pain, hematemesis and hematochezia.   Genitourinary:  Negative for flank pain and hematuria.   Neurological:  Negative for dizziness and focal weakness.   Psychiatric/Behavioral:  Negative for altered mental status. The patient does not have insomnia.        Objective    Vital Signs  Visit Vitals  /71   Pulse 73   Temp 97.7 °F (36.5 °C) (Oral)   Resp 15   Ht 172.7 cm (68\")   Wt 109 kg (240 lb 8.4 oz)   SpO2 96%   BMI 36.57 kg/m²     Oxygen Therapy  SpO2: 96 %  Pulse Oximetry Type: Continuous  Device (Oxygen Therapy): room air  Flow (L/min): 2  Flowsheet Rows      Flowsheet Row First Filed Value   Admission Height 172.7 cm (68\") Documented at 12/22/2023 1200   Admission Weight " 110 kg (243 lb 9.7 oz) Documented at 12/22/2023 1258          Intake & Output (last 3 days)         12/22 0701 12/23 0700 12/23 0701 12/24 0700 12/24 0701 12/25 0700 12/25 0701 12/26 0700    P.O. 240 924 840     I.V. (mL/kg)   56 (0.5)     Total Intake(mL/kg) 240 (2.2) 924 (8.4) 896 (8.2)     Urine (mL/kg/hr)   650 (0.2)     Emesis/NG output   0     Stool   0     Total Output   650     Net +240 +924 +246             Urine Unmeasured Occurrence   3 x     Stool Unmeasured Occurrence   2 x     Emesis Unmeasured Occurrence   0 x           Lines, Drains & Airways       Active LDAs       Name Placement date Placement time Site Days    Peripheral IV 12/22/23 1326 Right Antecubital 12/22/23  1326  Antecubital  1    Arterial Sheath 6 Fr. Right Femoral 12/24/23  0917  Femoral  less than 1                    Physical Exam:  Constitutional:       Appearance: Well-developed.   Eyes:      Conjunctiva/sclera: Conjunctivae normal.      Pupils: Pupils are equal, round, and reactive to light.   HENT:      Head: Normocephalic and atraumatic.   Neck:      Thyroid: No thyromegaly.   Pulmonary:      Effort: Pulmonary effort is normal.      Breath sounds: Normal breath sounds.   Cardiovascular:      Normal rate. Regular rhythm.   Pulses:     Intact distal pulses.   Edema:     Peripheral edema absent.   Abdominal:      General: Bowel sounds are normal.      Palpations: Abdomen is soft.   Musculoskeletal:      Cervical back: Normal range of motion and neck supple. Skin:     General: Skin is warm.   Neurological:      Mental Status: Alert and oriented to person, place, and time.         Results Review:     I reviewed the patient's new clinical results.    Lab Results (last 24 hours)       Procedure Component Value Units Date/Time    Basic Metabolic Panel [[id number]]  (Abnormal) Collected: 12/25/23 0811    Specimen: Blood Updated: 12/25/23 0922     Glucose 194 mg/dL      BUN 15 mg/dL      Creatinine 0.83 mg/dL      Sodium 139 mmol/L       Potassium 4.1 mmol/L      Comment: Slight hemolysis detected by analyzer. Result may be falsely elevated.        Chloride 102 mmol/L      CO2 27.0 mmol/L      Calcium 9.1 mg/dL      BUN/Creatinine Ratio 18.1     Anion Gap 10.0 mmol/L      eGFR 94.7 mL/min/1.73     Narrative:      GFR Normal >60  Chronic Kidney Disease <60  Kidney Failure <15      Magnesium [[id number]]  (Normal) Collected: 12/25/23 0811    Specimen: Blood Updated: 12/25/23 0922     Magnesium 2.0 mg/dL     Phosphorus [[id number]]  (Normal) Collected: 12/25/23 0811    Specimen: Blood Updated: 12/25/23 0922     Phosphorus 2.7 mg/dL     CBC & Differential [[id number]]  (Abnormal) Collected: 12/25/23 0811    Specimen: Blood Updated: 12/25/23 0852    Narrative:      The following orders were created for panel order CBC & Differential.  Procedure                               Abnormality         Status                     ---------                               -----------         ------                     CBC Auto Differential[[id number]]        Abnormal            Final result                 Please view results for these tests on the individual orders.    CBC Auto Differential [[id number]]  (Abnormal) Collected: 12/25/23 0811    Specimen: Blood Updated: 12/25/23 0852     WBC 7.20 10*3/mm3      RBC 4.36 10*6/mm3      Hemoglobin 12.5 g/dL      Hematocrit 37.4 %      MCV 85.7 fL      MCH 28.7 pg      MCHC 33.4 g/dL      RDW 13.6 %      RDW-SD 42.9 fl      MPV 8.2 fL      Platelets 216 10*3/mm3      Neutrophil % 70.6 %      Lymphocyte % 16.1 %      Monocyte % 6.5 %      Eosinophil % 6.1 %      Basophil % 0.7 %      Neutrophils, Absolute 5.10 10*3/mm3      Lymphocytes, Absolute 1.20 10*3/mm3      Monocytes, Absolute 0.50 10*3/mm3      Eosinophils, Absolute 0.40 10*3/mm3      Basophils, Absolute 0.00 10*3/mm3      nRBC 0.1 /100 WBC     Lipid Panel [[id number]] Collected: 12/25/23 0811    Specimen: Blood Updated: 12/25/23 0847    POC Glucose Once [[id number]]   (Abnormal) Collected: 12/25/23 0825    Specimen: Blood Updated: 12/25/23 0826     Glucose 214 mg/dL      Comment: Serial Number: [id number]:  [id number]       POC Glucose Once [[id number]]  (Abnormal) Collected: 12/24/23 2318    Specimen: Blood Updated: 12/24/23 2319     Glucose 195 mg/dL      Comment: Serial Number: [id number]:  [id number]       POC Glucose Once [[id number]]  (Abnormal) Collected: 12/24/23 1615    Specimen: Blood Updated: 12/24/23 1616     Glucose 120 mg/dL      Comment: Serial Number: [id number]:  [id number]       POC Activated Clotting Time [[id number]]  (Abnormal) Collected: 12/24/23 1349    Specimen: Arterial Blood Updated: 12/24/23 1353     Activated Clotting Time  147 Seconds      Comment: Serial Number: [id number]:  [id number]             No results found for this or any previous visit.        Medication Review:   I have reviewed the patient's current medication list  Scheduled Meds:amLODIPine, 5 mg, Oral, Daily  aspirin, 81 mg, Oral, Daily  atorvastatin, 10 mg, Oral, Nightly  clopidogrel, 75 mg, Oral, Daily  insulin lispro, 2-9 Units, Subcutaneous, 4x Daily AC & at Bedtime  losartan, 100 mg, Oral, Nightly  metoprolol succinate XL, 50 mg, Oral, Nightly  senna-docusate sodium, 2 tablet, Oral, BID  sodium chloride, 10 mL, Intravenous, Q12H  terazosin, 5 mg, Oral, Nightly      Continuous Infusions:nitroglycerin, 5-200 mcg/min, Last Rate: Stopped (12/25/23 0842)      PRN Meds:.  acetaminophen    aluminum-magnesium hydroxide-simethicone    atropine    senna-docusate sodium **AND** polyethylene glycol **AND** bisacodyl **AND** bisacodyl    Calcium Replacement - Follow Nurse / BPA Driven Protocol    dextrose    dextrose    glucagon (human recombinant)    Magnesium Standard Dose Replacement - Follow Nurse / BPA Driven Protocol    Morphine    nitroglycerin    ondansetron    Phosphorus Replacement - Follow Nurse / BPA Driven Protocol    Potassium Replacement - Follow Nurse / BPA Driven  Protocol    [COMPLETED] Insert Peripheral IV **AND** sodium chloride    sodium chloride    sodium chloride    sodium chloride    ECG/EMG Results (last 24 hours)       Procedure Component Value Units Date/Time    SCANNED - TELEMETRY   [[id number]] Resulted: 12/22/23     Updated: 12/23/23 1819    SCANNED - TELEMETRY   [[id number]] Resulted: 12/22/23     Updated: 12/23/23 1819    SCANNED - TELEMETRY   [[id number]] Resulted: 12/22/23     Updated: 12/23/23 1946    SCANNED - TELEMETRY   [[id number]] Resulted: 12/22/23     Updated: 12/23/23 2113    SCANNED - TELEMETRY   [[id number]] Resulted: 12/22/23     Updated: 12/23/23 2248    SCANNED - TELEMETRY   [[id number]] Resulted: 12/22/23     Updated: 12/23/23 2248            Imaging Results (Last 24 Hours)       ** No results found for the last 24 hours. **          Results for orders placed during the hospital encounter of 12/22/23    Cardiac Catheterization/Vascular Study (Needs Review)  This result has not been signed. Information might be incomplete.    Narrative  Table formatting from the original result was not included.  Cardiac Catheterization with PCI Report    Arjordan JONEL Chinderik  [id number]  12/24/2023  @PCP@    He underwent cardiac catheterization and percutaneous coronary intervention.    Indications for the procedure include: acute coronary syndrome.  Patient presented with symptoms of unstable angina and abnormal stress test with refractory recurrent chest pain on maximal medical therapy    Procedure Details:  The risks, benefits, complications, treatment options, and expected outcomes were discussed with the patient. The patient and/or family concurred with the proposed plan, giving informed consent.    After informed consent the patient was brought to the cath lab after appropriate IV hydration was begun and oral premedication was given. He was further sedated with fentanyl. He was prepped and draped in the usual manner. Using the modified Seldinger access technique, a 6  Turkmen sheath was placed in the femoral artery. A left heart catheterization with coronary arteriography was performed. Findings are discussed below.    The decision was made to proceed with intervention. He received Heparin as per protocol. The details of the intervention are as follows:    For the interventional procedure we used a JR4 guide catheter with a BMW guidewire to cross the lesion in the distal portion of the right coronary artery lesion is directly stented with a 3.0 x 15 mm Xience drug-eluting stent that was deployed at 14 jeromy with good angiographic result.  Patient tolerated the procedure very well with no immediate postprocedure complications plan to obtain hemostasis by manual pressure.  Procedural anticoagulation was heparin patient received 600 mg of Plavix at the end of the procedure.    After the procedure was completed, sedation was stopped and the sheaths and catheters were all removed according to established hospital protocols.    Conscious sedation:  Conscious sedation was performed according to protocol.  I supervised and directed an independent trained observer with the assistance of monitoring the patient's level of consciousness and physiologic status throughout the procedure.  Intraoperative service time was 90 minutes.    Findings:    Hemodynamics Aortic pressure systolic 166 diastolic 75 with a mean pressure of 110 mmHg  Left Main Large-caliber vessel angiographically normal bifurcates into left artery descending and left circumflex arteries  RCA Large-caliber vessel dominant vessel  Proximal right coronary artery has angiographic 30 to 40% stenosis  Mid right coronary artery has a patent stent with mild in-stent restenosis angiographically 10 to 20%  Distal right coronary artery has a focal area of angiographic 80 to 90% stenosis likely the culprit vessel for patient's symptoms of unstable angina  Right coronary artery provides a moderate caliber PDA and PLB branches that are  angiographically normal  LAD Large-caliber vessel  LAD has a patent stent in the proximal and midportion  Distal edge of the LAD stent has angiographic 20 to 30% stenosis  Mid to distal LAD has another focal area of angiographic 50% stenosis  First diagonal branch of the LAD is a moderate caliber vessel angiographically normal  Circ Moderate to large caliber vessel  First marginal branch of the left circumflex artery is almost like a ramus intermediate branch has a patent stent in the proximal portion  Proximal to the stent in the ostial portion of the ramus inferior branch there is angiographic 30 to 40% stenosis  Mid ramus intermediate branch after the stented segment has another focal area of angiographic 40% stenosis  Continuation of the left circumflex artery has a mid angiographic 30 to 40% stenosis  LV Not done  Coronary dominance Right coronary artery    Interventions/Vessels Successful PCI and stenting of the distal right coronary artery with placement of a drug-eluting stent  Guides/Wires BMW  Devices Xience drug-eluting stent 3.0 x 15 mm  Post % Stenosis  0  Pre-procedure GEORGINA flow  3  Post procedure GEORGINA flow  3  Closure Device None  Complications None    Estimated Blood Loss:  Minimal    Specimens: None    Complications:  None; patient tolerated the procedure well.    Disposition: PACU - hemodynamically stable.    Condition: stable    Impressions:  Severe single-vessel coronary artery disease involving the distal right coronary artery culprit lesion for the patient's symptoms of unstable angina successfully treated with placement of a Xience drug-eluting stent  Moderate disease involving the ramus intermediate branch and mid to distal LAD plan to conservatively manage with medical therapy  Patent stents in the LAD mid right coronary artery and also ramus intermediate branch of the left circumflex artery    Recommendations:  Aspirin 81 mg p.o. once a day  Plavix 75 mg p.o. once a day  Observe patient in  PCU bed overnight  Test results reviewed and discussed with patient and family     No results found for this or any previous visit.     Lab Results   Component Value Date    GLUCOSE 194 (H) 2023    BUN 15 2023    CREATININE 0.83 2023    EGFR 94.7 2023    BCR 18.1 2023    K 4.1 2023    CO2 27.0 2023    CALCIUM 9.1 2023    ALBUMIN 4.4 2023    BILITOT 0.4 2023    AST 16 2023    ALT 22 2023      Lab Results   Component Value Date    CHOL 129 2023    CHLPL 126 2019    TRIG 134 2023    HDL 36 (L) 2023    LDL 69 2023      Lab Results   Component Value Date    CKTOTAL 84 10/03/2018    TROPONINT 12 2023        Assessment & Plan       Chest pain    Coronary artery disease involving native coronary artery of native heart    Mixed hyperlipidemia    Essential hypertension    Type 2 diabetes mellitus without complications    Pre-operative clearance    Vocal cord polyps    S/P coronary artery stent placement    S/P blane    Borderline diabetes    Preop cardiovascular exam        Alcira Garcia MD  23  09:45 EST                 Electronically signed by Alcira Garcia MD at 23 0948       Susan Montana APRN at 23 1125       Attestation signed by Surya Xie MD at 23 2249    Agree with NP H&P physical exam and treatment plan                [facility] Progress Note    Patient Name: Codey Rhodes  : 1955  MRN: [id number]  Primary Care Physician: Ta Islas MD  Date of admission: 2023     Patient Care Team:  Ta Islas MD as PCP - General          Subjective   History Present Illness     Chief Complaint:   Chief Complaint   Patient presents with    Chest Pain     Chest Pain     Chest Pain         Chest Pain   Pertinent negatives include no irregular heartbeat, malaise/fatigue, nausea, near-syncope, palpitations, shortness of breath or  vomiting.      HPI  ED 12/22/2023  Context: Chest tightness for the last 2 days that he states feels like it is similar to last time he had some blockages.  Took a nitro today with some relief.  Has had mild shortness of breath with any productive cough or fever.  Recently took up smoking again.  Denies any edema of his lower extremities.      Observation 12/23/2023  Patient agrees with HPI noted above including chest tightness for the past 2 days.  Reports history of CAD with cardiac stents a few years ago.  Patient currently asymptomatic.  Discussed abnormal stress test with patient as well as cardiology consultation.    Observation 12/24/2023  Patient denied any acute distress or complaints when seen by provider earlier this morning. Per nursing staff, heart cath required intervention. Patient moving to PCU level care. Will consult the hospitalist.     Review of Systems   Cardiovascular:  Positive for chest pain.     Review of Systems   Constitutional: Negative for malaise/fatigue.   Cardiovascular:  Positive for chest pain. Negative for irregular heartbeat, near-syncope and palpitations.   Respiratory:  Negative for shortness of breath.    Gastrointestinal:  Negative for nausea and vomiting.   All other systems reviewed and are negative.      Personal History     Past Medical History:   Past Medical History:   Diagnosis Date    Coronary artery disease involving native coronary artery of native heart 12/8/2016    PCI 12/1/12006 PCI RCA 12/14/16 PCI to LAD 11/2018    Hypertension     Mixed hyperlipidemia 11/19/2019    Type 2 diabetes mellitus without complications 11/19/2019       Surgical History:      Past Surgical History:   Procedure Laterality Date    CARDIAC CATHETERIZATION      CORONARY STENT PLACEMENT             Family History: family history includes COPD in his mother; Cancer in his mother; Heart disease in his mother; No Known Problems in his father. Otherwise pertinent FHx was reviewed and  unremarkable.     Social History:  reports that he has been smoking cigarettes. He has been smoking an average of .25 packs per day. He has never used smokeless tobacco. He reports that he does not currently use alcohol. He reports that he does not use drugs.      Medications:  Prior to Admission medications    Medication Sig Start Date End Date Taking? Authorizing Provider   amLODIPine (NORVASC) 10 MG tablet Take 1 tablet by mouth Daily. 3/22/23  Yes Kong Brennan MD   aspirin 81 MG EC tablet Take 1 tablet by mouth Daily.   Yes Kong Brennan MD   losartan (COZAAR) 100 MG tablet Take 1 tablet by mouth Every Night. 3/6/23  Yes Kong Brennan MD   metoprolol succinate XL (TOPROL-XL) 50 MG 24 hr tablet Take 1 tablet by mouth Daily. 11/25/22  Yes Kong Brennan MD   nitroglycerin (NITROSTAT) 0.4 MG SL tablet 1 under the tongue as needed for angina, may repeat q5mins for up three doses 6/28/21  Yes Pradip Mcneill MD   pravastatin (PRAVACHOL) 40 MG tablet Take 1 tablet by mouth Every Night. 5/18/18  Yes Kong Brennan MD   terazosin (HYTRIN) 5 MG capsule Take 1 capsule by mouth Every Night. 3/17/23  Yes Kong Brennan MD       Allergies:    Allergies   Allergen Reactions    Crestor [Rosuvastatin] Myalgia       Objective   Objective     Vital Signs  Temp:  [97.5 °F (36.4 °C)-97.9 °F (36.6 °C)] 97.5 °F (36.4 °C)  Heart Rate:  [68-90] 78  Resp:  [13-18] 16  BP: (109-176)/(69-97) 151/82  SpO2:  [95 %-100 %] 99 %  on  Flow (L/min):  [2] 2;   Device (Oxygen Therapy): room air  Body mass index is 37.04 kg/m².    Physical Exam  Vitals and nursing note reviewed.   Constitutional:       Appearance: Normal appearance. He is obese.   HENT:      Head: Normocephalic and atraumatic.      Right Ear: External ear normal.      Left Ear: External ear normal.      Nose: Nose normal.      Mouth/Throat:      Mouth: Mucous membranes are moist.   Eyes:      Extraocular Movements: Extraocular  movements intact.   Cardiovascular:      Rate and Rhythm: Normal rate and regular rhythm.      Pulses: Normal pulses.      Heart sounds: Normal heart sounds.   Pulmonary:      Effort: Pulmonary effort is normal.      Breath sounds: Normal breath sounds.   Abdominal:      General: Abdomen is flat. Bowel sounds are normal.      Palpations: Abdomen is soft.   Musculoskeletal:         General: Normal range of motion.      Cervical back: Normal range of motion.   Skin:     General: Skin is warm.   Neurological:      Mental Status: He is alert and oriented to person, place, and time.   Psychiatric:         Behavior: Behavior normal.         Thought Content: Thought content normal.         Judgment: Judgment normal.         Results Review:  I have personally reviewed most recent lab results and radiology images and interpretations and agree with findings, most notably: Troponin, CMP, CBC, EKG and chest x-ray.       Results from last 7 days   Lab Units 12/24/23  0436 12/23/23  0449 12/22/23  1326   WBC 10*3/mm3 8.90   < > 8.50   HEMOGLOBIN g/dL 13.4   < > 14.4   HEMATOCRIT % 39.3   < > 42.5   PLATELETS 10*3/mm3 235   < > 265   INR   --   --  1.00    < > = values in this interval not displayed.     Results from last 7 days   Lab Units 12/24/23  0436 12/23/23  0449 12/22/23  1628 12/22/23  1326   SODIUM mmol/L 142   < >  --  138   POTASSIUM mmol/L 3.3*   < >  --  3.4*   CHLORIDE mmol/L 103   < >  --  99   CO2 mmol/L 28.0   < >  --  34.0*   BUN mg/dL 18   < >  --  17   CREATININE mg/dL 0.78   < >  --  0.92   GLUCOSE mg/dL 162*   < >  --  183*   CALCIUM mg/dL 8.8   < >  --  9.7   HSTROP T ng/L  --   --  12 13   PROBNP pg/mL  --   --   --  178.2    < > = values in this interval not displayed.     Estimated Creatinine Clearance: 108 mL/min (by C-G formula based on SCr of 0.78 mg/dL).  Brief Urine Lab Results  (Last result in the past 365 days)        Color   Clarity   Blood   Leuk Est   Nitrite   Protein   CREAT   Urine HCG         09/20/23 1119 Yellow   Cloudy   Large (3+)   Moderate (2+)   Negative   100 mg/dL (2+)                   Microbiology Results (last 10 days)       ** No results found for the last 240 hours. **            ECG/EMG Results (most recent)       Procedure Component Value Units Date/Time    ECG 12 Lead Chest Pain [[id number]] Collected: 12/22/23 1304     Updated: 12/22/23 1305     QT Interval 347 ms      QTC Interval 380 ms     Narrative:      HEART RATE= 72  bpm  RR Interval= 836  ms  CO Interval= 149  ms  P Horizontal Axis=   deg  P Front Axis= 65  deg  QRSD Interval= 81  ms  QT Interval= 347  ms  QTcB= 380  ms  QRS Axis= 51  deg  T Wave Axis= 70  deg  - NORMAL ECG -  Sinus rhythm  When compared with ECG of 24-Oct-2023 12:21:59,  No significant change  Electronically Signed By:   Date and Time of Study: 2023-12-22 13:04:42    SCANNED - TELEMETRY   [[id number]] Resulted: 12/22/23     Updated: 12/22/23 2033    SCANNED - TELEMETRY   [[id number]] Resulted: 12/22/23     Updated: 12/22/23 2049    SCANNED - TELEMETRY   [[id number]] Resulted: 12/22/23     Updated: 12/22/23 2311    SCANNED - TELEMETRY   [[id number]] Resulted: 12/22/23     Updated: 12/22/23 2311    SCANNED - TELEMETRY   [[id number]] Resulted: 12/22/23     Updated: 12/22/23 2355    SCANNED - TELEMETRY   [[id number]] Resulted: 12/22/23     Updated: 12/23/23 1819    SCANNED - TELEMETRY   [[id number]] Resulted: 12/22/23     Updated: 12/23/23 1819    SCANNED - TELEMETRY   [[id number]] Resulted: 12/22/23     Updated: 12/23/23 1946    SCANNED - TELEMETRY   [[id number]] Resulted: 12/22/23     Updated: 12/23/23 2113    SCANNED - TELEMETRY   [[id number]] Resulted: 12/22/23     Updated: 12/23/23 2248    SCANNED - TELEMETRY   [[id number]] Resulted: 12/22/23     Updated: 12/23/23 2248                    XR Chest 1 View    Result Date: 12/22/2023  Impression: No acute cardiopulmonary findings. Electronically Signed: Surya Rai MD  12/22/2023 1:57 PM EST  Workstation ID: [id number]        Estimated Creatinine Clearance: 108 mL/min (by C-G formula based on SCr of 0.78 mg/dL).    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            Chest pain            Lab Results   Component Value Date     TROPONINT 12 12/22/2023     TROPONINT 13 12/22/2023   -CMP and CBC generally unremarkable except hyperglycemia and mild hypokalemia  -Lipid panel unremarkable except HDL 36  -Chest X-ray: No acute cardiopulmonary findings  -EKG: Sinus rhythm with heart rate 72, MO interval 149 and   -In the ED pt given potassium per electrolyte protocol  -Stress Test showed mild inferior and apical ischemia  -Telemetry  -Cardiology consulted and recommended a heart cath  -Patient had a heart cath today and required intervention. Patient transferring to PCU level care, according to nursing staff.   -Hospitalist consulted      Diabetes mellitus  -Moderately controlled   Lab Results   Component Value Date    GLUCOSE 162 (H) 12/24/2023    GLUCOSE 154 (H) 12/23/2023    GLUCOSE 183 (H) 12/22/2023    GLUCOSE 174 (H) 10/24/2023   -A1c 7.50  -Sliding scale insulin  -Diabetic diet  -Monitor AC and HS      Hypertension  -Moderately controlled   BP Readings from Last 1 Encounters:   12/24/23 147/89   - Continue losartan, and amlodipine and metoprolol  - Monitor while admitted      Obesity  -BMI  37.04  -Lifestyle modifications           VTE Prophylaxis -   Mechanical Order History:        Ordered        12/22/23 1635  Place Sequential Compression Device  Once            12/22/23 1635  Maintain Sequential Compression Device  Continuous                          Pharmalogical Order History:        Ordered     Dose Route Frequency Stop    12/24/23 0928  heparin (porcine) injection         -- -- Code / Trauma / Sedation Medication --    12/23/23 1548  [MAR Hold]  Enoxaparin Sodium (LOVENOX) syringe 40 mg        (MAR Hold since Sun 12/24/2023 at 0855.Hold Reason: Unreviewed Transfer Orders)    40 mg SC Daily --                    CODE STATUS:    Code Status and Medical Interventions:   Ordered at: 12/22/23 1626     Level Of Support Discussed With:    Patient     Code Status (Patient has no pulse and is not breathing):    CPR (Attempt to Resuscitate)     Medical Interventions (Patient has pulse or is breathing):    Full Support       This patient has been examined wearing personal protective equipment.     I discussed the patient's findings and my recommendations with patient and nursing staff.      Signature:Electronically signed by KEVIN Cedeno, 12/24/23, 11:25 AM EST.     Electronically signed by Surya Xie MD at 12/24/23 9393       Alcira Garcia MD at 12/24/23 1013          Cardiology [facility]      Patient Care Team:  Ta Islas MD as PCP - General    Cardiology assessment and plan        Chest pain/symptoms are concerning for unstable angina  Shortness of breath  Dyspnea on exertion  Coronary artery disease s/p coronary intervention  Essential hypertension  Hyperlipidemia  Diabetes is comorbidity  Obesity BMI greater than 35  Multivessel CAD, RCA and LAD with interventions 2016 2016 respectively        Symptoms are concerning for unstable angina  Abnormal stress test  Continue to have ongoing symptoms of chest pain on maximal medical therapy  Lexiscan Cardiolite test showed mild  "inferior and apical ischemia.  Gated SPECT images revealed normal left ventricular size and contractility with ejection fraction of 66%.  Tmax is 97.5 pulse is 70 respirations are 18 blood pressure is 149/84 sats are 100%  Sodium is 142 potassium is 3.3 creatinine is 13.4  Plans to proceed with cardiac catheterization for definite diagnosis and treatment options  Risk benefits and alternatives reviewed and discussed patient            Chief Complaint: Chest pain and unstable angina    Subjective no new complaints    Interval History: No significant change in the overall status    History taken from: patient    Review of Systems:  Review of Systems   Constitutional: Negative for chills, decreased appetite and malaise/fatigue.   HENT:  Negative for congestion and nosebleeds.    Eyes:  Negative for blurred vision and double vision.   Cardiovascular:  Positive for chest pain and dyspnea on exertion. Negative for irregular heartbeat, leg swelling, near-syncope, orthopnea, palpitations and paroxysmal nocturnal dyspnea.   Respiratory:  Positive for shortness of breath. Negative for cough.    Hematologic/Lymphatic: Negative for adenopathy. Does not bruise/bleed easily.   Skin:  Negative for color change and rash.   Musculoskeletal:  Negative for back pain and joint pain.   Gastrointestinal:  Negative for bloating, abdominal pain, hematemesis and hematochezia.   Genitourinary:  Negative for flank pain and hematuria.   Neurological:  Negative for dizziness and focal weakness.   Psychiatric/Behavioral:  Negative for altered mental status. The patient does not have insomnia.        Objective    Vital Signs  Visit Vitals  /84   Pulse 73   Temp 97.5 °F (36.4 °C) (Oral)   Resp 18   Ht 172.7 cm (68\")   Wt 110 kg (243 lb 9.7 oz)   SpO2 100%   BMI 37.04 kg/m²     Oxygen Therapy  SpO2: 100 %  Pulse Oximetry Type: Continuous  Device (Oxygen Therapy): room air  Flow (L/min): 2  Flowsheet Rows      Flowsheet Row First Filed Value " "  Admission Height 172.7 cm (68\") Documented at 12/22/2023 1200   Admission Weight 110 kg (243 lb 9.7 oz) Documented at 12/22/2023 1258          Intake & Output (last 3 days)         12/21 0701 12/22 0700 12/22 0701  12/23 0700 12/23 0701  12/24 0700 12/24 0701  12/25 0700    P.O.  240 924     Total Intake(mL/kg)  240 (2.2) 924 (8.4)     Net  +240 +924                   Lines, Drains & Airways       Active LDAs       Name Placement date Placement time Site Days    Peripheral IV 12/22/23 1326 Right Antecubital 12/22/23  1326  Antecubital  1    Arterial Sheath 6 Fr. Right Femoral 12/24/23  0917  Femoral  less than 1                    Physical Exam:  Constitutional:       Appearance: Well-developed.   Eyes:      Conjunctiva/sclera: Conjunctivae normal.      Pupils: Pupils are equal, round, and reactive to light.   HENT:      Head: Normocephalic and atraumatic.   Neck:      Thyroid: No thyromegaly.   Pulmonary:      Effort: Pulmonary effort is normal.      Breath sounds: Normal breath sounds.   Cardiovascular:      Normal rate. Regular rhythm.   Pulses:     Intact distal pulses.   Edema:     Peripheral edema absent.   Abdominal:      General: Bowel sounds are normal.      Palpations: Abdomen is soft.   Musculoskeletal:      Cervical back: Normal range of motion and neck supple. Skin:     General: Skin is warm.   Neurological:      Mental Status: Alert and oriented to person, place, and time.         Results Review:     I reviewed the patient's new clinical results.    Lab Results (last 24 hours)       Procedure Component Value Units Date/Time    POC Glucose Once [[id number]]  (Abnormal) Collected: 12/24/23 0725    Specimen: Blood Updated: 12/24/23 0727     Glucose 159 mg/dL      Comment: Serial Number: [id number]:  [id number]       Basic Metabolic Panel [[id number]]  (Abnormal) Collected: 12/24/23 0436    Specimen: Blood from Hand, Left Updated: 12/24/23 0552     Glucose 162 mg/dL      BUN 18 mg/dL      Creatinine " 0.78 mg/dL      Sodium 142 mmol/L      Potassium 3.3 mmol/L      Chloride 103 mmol/L      CO2 28.0 mmol/L      Calcium 8.8 mg/dL      BUN/Creatinine Ratio 23.1     Anion Gap 11.0 mmol/L      eGFR 96.5 mL/min/1.73     Narrative:      GFR Normal >60  Chronic Kidney Disease <60  Kidney Failure <15      CBC & Differential [[id number]]  (Abnormal) Collected: 12/24/23 0436    Specimen: Blood from Hand, Left Updated: 12/24/23 0538    Narrative:      The following orders were created for panel order CBC & Differential.  Procedure                               Abnormality         Status                     ---------                               -----------         ------                     CBC Auto Differential[[id number]]        Abnormal            Final result                 Please view results for these tests on the individual orders.    CBC Auto Differential [[id number]]  (Abnormal) Collected: 12/24/23 0436    Specimen: Blood from Hand, Left Updated: 12/24/23 0538     WBC 8.90 10*3/mm3      RBC 4.58 10*6/mm3      Hemoglobin 13.4 g/dL      Hematocrit 39.3 %      MCV 85.9 fL      MCH 29.3 pg      MCHC 34.1 g/dL      RDW 13.5 %      RDW-SD 40.7 fl      MPV 8.0 fL      Platelets 235 10*3/mm3      Neutrophil % 71.0 %      Lymphocyte % 17.0 %      Monocyte % 7.0 %      Eosinophil % 4.4 %      Basophil % 0.6 %      Neutrophils, Absolute 6.30 10*3/mm3      Lymphocytes, Absolute 1.50 10*3/mm3      Monocytes, Absolute 0.60 10*3/mm3      Eosinophils, Absolute 0.40 10*3/mm3      Basophils, Absolute 0.10 10*3/mm3      nRBC 0.0 /100 WBC     POC Glucose Once [[id number]]  (Abnormal) Collected: 12/23/23 2009    Specimen: Blood Updated: 12/23/23 2011     Glucose 263 mg/dL      Comment: Serial Number: [id number]:  [id number]       POC Glucose Once [[id number]]  (Abnormal) Collected: 12/23/23 1654    Specimen: Blood Updated: 12/23/23 1656     Glucose 179 mg/dL      Comment: Serial Number: [id number]:  [id number]       POC Glucose  Once [[id number]]  (Abnormal) Collected: 12/23/23 1132    Specimen: Blood Updated: 12/23/23 1139     Glucose 146 mg/dL      Comment: Serial Number: [id number]:  [id number]       Lipid Panel [[id number]]  (Abnormal) Collected: 12/23/23 0449    Specimen: Blood from Arm, Left Updated: 12/23/23 1127     Total Cholesterol 129 mg/dL      Triglycerides 134 mg/dL      HDL Cholesterol 36 mg/dL      LDL Cholesterol  69 mg/dL      VLDL Cholesterol 24 mg/dL      LDL/HDL Ratio 1.84    Narrative:      Cholesterol Reference Ranges  (U.S. Department of Health and Human Services ATP III Classifications)    Desirable          <200 mg/dL  Borderline High    200-239 mg/dL  High Risk          >240 mg/dL      Triglyceride Reference Ranges  (U.S. Department of Health and Human Services ATP III Classifications)    Normal           <150 mg/dL  Borderline High  150-199 mg/dL  High             200-499 mg/dL  Very High        >500 mg/dL    HDL Reference Ranges  (U.S. Department of Health and Human Services ATP III Classifications)    Low     <40 mg/dl (major risk factor for CHD)  High    >60 mg/dl ('negative' risk factor for CHD)        LDL Reference Ranges  (U.S. Department of Health and Human Services ATP III Classifications)    Optimal          <100 mg/dL  Near Optimal     100-129 mg/dL  Borderline High  130-159 mg/dL  High             160-189 mg/dL  Very High        >189 mg/dL          No results found for this or any previous visit.        Medication Review:   I have reviewed the patient's current medication list  Scheduled Meds:amLODIPine, 5 mg, Oral, Daily  [MAR Hold] aspirin, 81 mg, Oral, Daily  [MAR Hold] atorvastatin, 10 mg, Oral, Daily  [MAR Hold] enoxaparin, 40 mg, Subcutaneous, Daily  [MAR Hold] insulin lispro, 2-9 Units, Subcutaneous, 4x Daily AC & at Bedtime  losartan, 100 mg, Oral, Nightly  metoprolol succinate XL, 50 mg, Oral, Daily  [MAR Hold] nitroglycerin, 1 inch, Topical, Q6H  potassium chloride, 40 mEq, Oral,  Q4H  [MAR Hold] senna-docusate sodium, 2 tablet, Oral, BID  [MAR Hold] sodium chloride, 10 mL, Intravenous, Q12H  terazosin, 5 mg, Oral, Nightly      Continuous Infusions:   PRN Meds:.  [MAR Hold] acetaminophen **OR** [MAR Hold] acetaminophen **OR** [MAR Hold] acetaminophen    [MAR Hold] aluminum-magnesium hydroxide-simethicone    [MAR Hold] senna-docusate sodium **AND** [MAR Hold] polyethylene glycol **AND** [MAR Hold] bisacodyl **AND** [MAR Hold] bisacodyl    [MAR Hold] Calcium Replacement - Follow Nurse / BPA Driven Protocol    clopidogrel    [MAR Hold] dextrose    [MAR Hold] dextrose    fentaNYL citrate (PF)    [MAR Hold] glucagon (human recombinant)    heparin (porcine)    iopamidol    lidocaine    [MAR Hold] Magnesium Standard Dose Replacement - Follow Nurse / BPA Driven Protocol    midazolam    [MAR Hold] Morphine    [MAR Hold] nitroglycerin    [MAR Hold] ondansetron    [MAR Hold] Phosphorus Replacement - Follow Nurse / BPA Driven Protocol    Potassium Replacement - Follow Nurse / BPA Driven Protocol    [COMPLETED] Insert Peripheral IV **AND** [MAR Hold] sodium chloride    [MAR Hold] sodium chloride    [MAR Hold] sodium chloride    ECG/EMG Results (last 24 hours)       Procedure Component Value Units Date/Time    SCANNED - TELEMETRY   [[id number]] Resulted: 12/22/23     Updated: 12/23/23 1819    SCANNED - TELEMETRY   [[id number]] Resulted: 12/22/23     Updated: 12/23/23 1819    SCANNED - TELEMETRY   [[id number]] Resulted: 12/22/23     Updated: 12/23/23 1946    SCANNED - TELEMETRY   [[id number]] Resulted: 12/22/23     Updated: 12/23/23 2113    SCANNED - TELEMETRY   [[id number]] Resulted: 12/22/23     Updated: 12/23/23 2248    SCANNED - TELEMETRY   [[id number]] Resulted: 12/22/23     Updated: 12/23/23 2248            Imaging Results (Last 24 Hours)       ** No results found for the last 24 hours. **          Results for orders placed during the hospital encounter of 12/22/23    Cardiac Catheterization/Vascular Study  (Needs Review)  This result has not been signed. Information might be incomplete.    Narrative  Table formatting from the original result was not included.  Cardiac Catheterization with PCI Report    Codey Rhodes  [id number]  12/24/2023  @PCP@    He underwent cardiac catheterization and percutaneous coronary intervention.    Indications for the procedure include: acute coronary syndrome.  Patient presented with symptoms of unstable angina and abnormal stress test with refractory recurrent chest pain on maximal medical therapy    Procedure Details:  The risks, benefits, complications, treatment options, and expected outcomes were discussed with the patient. The patient and/or family concurred with the proposed plan, giving informed consent.    After informed consent the patient was brought to the cath lab after appropriate IV hydration was begun and oral premedication was given. He was further sedated with fentanyl. He was prepped and draped in the usual manner. Using the modified Seldinger access technique, a 6 Italian sheath was placed in the femoral artery. A left heart catheterization with coronary arteriography was performed. Findings are discussed below.    The decision was made to proceed with intervention. He received Heparin as per protocol. The details of the intervention are as follows:    For the interventional procedure we used a JR4 guide catheter with a BMW guidewire to cross the lesion in the distal portion of the right coronary artery lesion is directly stented with a 3.0 x 15 mm Xience drug-eluting stent that was deployed at 14 jeromy with good angiographic result.  Patient tolerated the procedure very well with no immediate postprocedure complications plan to obtain hemostasis by manual pressure.  Procedural anticoagulation was heparin patient received 600 mg of Plavix at the end of the procedure.    After the procedure was completed, sedation was stopped and the sheaths and catheters were all  removed according to established hospital protocols.    Conscious sedation:  Conscious sedation was performed according to protocol.  I supervised and directed an independent trained observer with the assistance of monitoring the patient's level of consciousness and physiologic status throughout the procedure.  Intraoperative service time was 90 minutes.    Findings:    Hemodynamics Aortic pressure systolic 166 diastolic 75 with a mean pressure of 110 mmHg  Left Main Large-caliber vessel angiographically normal bifurcates into left artery descending and left circumflex arteries  RCA Large-caliber vessel dominant vessel  Proximal right coronary artery has angiographic 30 to 40% stenosis  Mid right coronary artery has a patent stent with mild in-stent restenosis angiographically 10 to 20%  Distal right coronary artery has a focal area of angiographic 80 to 90% stenosis likely the culprit vessel for patient's symptoms of unstable angina  Right coronary artery provides a moderate caliber PDA and PLB branches that are angiographically normal  LAD Large-caliber vessel  LAD has a patent stent in the proximal and midportion  Distal edge of the LAD stent has angiographic 20 to 30% stenosis  Mid to distal LAD has another focal area of angiographic 50% stenosis  First diagonal branch of the LAD is a moderate caliber vessel angiographically normal  Circ Moderate to large caliber vessel  First marginal branch of the left circumflex artery is almost like a ramus intermediate branch has a patent stent in the proximal portion  Proximal to the stent in the ostial portion of the ramus inferior branch there is angiographic 30 to 40% stenosis  Mid ramus intermediate branch after the stented segment has another focal area of angiographic 40% stenosis  Continuation of the left circumflex artery has a mid angiographic 30 to 40% stenosis  LV Not done  Coronary dominance Right coronary artery    Interventions/Vessels Successful PCI and  stenting of the distal right coronary artery with placement of a drug-eluting stent  Guides/Wires BMW  Devices Xience drug-eluting stent 3.0 x 15 mm  Post % Stenosis  0  Pre-procedure GEORGINA flow  3  Post procedure GEORGINA flow  3  Closure Device None  Complications None    Estimated Blood Loss:  Minimal    Specimens: None    Complications:  None; patient tolerated the procedure well.    Disposition: PACU - hemodynamically stable.    Condition: stable    Impressions:  Severe single-vessel coronary artery disease involving the distal right coronary artery culprit lesion for the patient's symptoms of unstable angina successfully treated with placement of a Xience drug-eluting stent  Moderate disease involving the ramus intermediate branch and mid to distal LAD plan to conservatively manage with medical therapy  Patent stents in the LAD mid right coronary artery and also ramus intermediate branch of the left circumflex artery    Recommendations:  Aspirin 81 mg p.o. once a day  Plavix 75 mg p.o. once a day  Observe patient in PCU bed overnight  Test results reviewed and discussed with patient and family     No results found for this or any previous visit.     Lab Results   Component Value Date    GLUCOSE 162 (H) 12/24/2023    BUN 18 12/24/2023    CREATININE 0.78 12/24/2023    EGFR 96.5 12/24/2023    BCR 23.1 12/24/2023    K 3.3 (L) 12/24/2023    CO2 28.0 12/24/2023    CALCIUM 8.8 12/24/2023    ALBUMIN 4.4 09/20/2023    BILITOT 0.4 09/20/2023    AST 16 09/20/2023    ALT 22 09/20/2023      Lab Results   Component Value Date    CHOL 129 12/23/2023    CHLPL 126 11/26/2019    TRIG 134 12/23/2023    HDL 36 (L) 12/23/2023    LDL 69 12/23/2023      Lab Results   Component Value Date    CKTOTAL 84 10/03/2018    TROPONINT 12 12/22/2023        Assessment & Plan       Chest pain    Coronary artery disease involving native coronary artery of native heart    Mixed hyperlipidemia    Essential hypertension    Type 2 diabetes mellitus  without complications    Pre-operative clearance    Vocal cord polyps    S/P coronary artery stent placement    S/P blane    Borderline diabetes    Preop cardiovascular exam        Alcira Garcia MD  23  10:13 EST                 Electronically signed by Alcira Garcia MD at 23 1014          Consult Notes (last 48 hours)        Young Poon MD at 23 1719        Consult Orders    1. Inpatient Hospitalist Consult [[id number]] ordered by Susan Montana APRN                     [facility] Medicine Services  Consult Note    Patient Name: Codey Rhodes  : 1955  MRN: [id number]  Primary Care Physician:  Ta Islas MD  Referring Physician: Alcira Garcia, *  Date of admission: 2023  Date and Time of Care: 2023     Inpatient Hospitalist Consult  Consult performed by: Young Poon MD  Consult ordered by: Susan Montana APRN            Reason for Consult/ Chief Complaint: Medical management      Subjective:     History of Present Illness:   This is a 68 years old male with a past medical history of CAD status post 4 stents, hypertension, hyperlipidemia and diabetes who presented to the ED with complaints of chest pain, dyspnea on exertion and shortness of breath.  He was admitted for further workup and management given his cardiac history.    Patient was seen by cardiology, was noted to have an abnormal stress test and is now status post cardiac catheterization with PCI and 1 stent placed.  Patient tolerated procedure well and is laying comfortably.    Hospitalist service is consulted for further medical management    At time of my examination, patient was laying comfortably in bed, tired appearing, family present and in no acute distress.  He is hemodynamically stable and just returned back from the Cath Lab.    Vital signs showed a blood pressure of 143/70, heart rate 67 bpm, respiratory rate 17 and saturating well on room  air.    Labs showed BMP within normal limits other than potassium of 3.3, CBC within normal limits.          Review of Systems:   Review of Systems    Personal History:     Past Medical History:   Diagnosis Date    Coronary artery disease involving native coronary artery of native heart 12/8/2016    PCI 12/1/12006 PCI RCA 12/14/16 PCI to LAD 11/2018    Hypertension     Mixed hyperlipidemia 11/19/2019    Type 2 diabetes mellitus without complications 11/19/2019       Past Surgical History:   Procedure Laterality Date    CARDIAC CATHETERIZATION      CORONARY STENT PLACEMENT         Family History: family history includes COPD in his mother; Cancer in his mother; Heart disease in his mother; No Known Problems in his father. Otherwise pertinent FHx was reviewed and not pertinent to current issue.    Social History:  reports that he has been smoking cigarettes. He has been smoking an average of .25 packs per day. He has never used smokeless tobacco. He reports that he does not currently use alcohol. He reports that he does not use drugs.    Home Medications:   amLODIPine, aspirin, losartan, metoprolol succinate XL, nitroglycerin, pravastatin, and terazosin    Allergies:  Allergies   Allergen Reactions    Crestor [Rosuvastatin] Myalgia         Objective:     Vital Signs  Temp:  [97.5 °F (36.4 °C)-97.8 °F (36.6 °C)] 97.5 °F (36.4 °C)  Heart Rate:  [] 70  Resp:  [16-18] 17  BP: ()/() 154/77  Flow (L/min):  [2] 2   Body mass index is 37.04 kg/m².    Physical Exam  Physical Exam  Appearance: No apparent distress, non-toxic appearing   HEENT/Neck: Neck is supple, Extraocular movements intact, Moist mucous membranes,   Cardiovascular: Regular Rate and Rhythm,   Pulmonary: Clear to auscultation bilaterally anterior chest wall   Abdomen: BS+, Soft, non-tender, non-distended.   Ext: No Cyanosis, Clubbing, Edema.   Neuro: Non-focal, Alert & Oriented x 3,           Scheduled Meds   amLODIPine, 5 mg, Oral,  Daily  [START ON 12/25/2023] aspirin, 81 mg, Oral, Daily  atorvastatin, 10 mg, Oral, Nightly  [START ON 12/25/2023] clopidogrel, 75 mg, Oral, Daily  insulin lispro, 2-9 Units, Subcutaneous, 4x Daily AC & at Bedtime  losartan, 100 mg, Oral, Nightly  metoprolol succinate XL, 50 mg, Oral, Nightly  senna-docusate sodium, 2 tablet, Oral, BID  sodium chloride, 10 mL, Intravenous, Q12H  terazosin, 5 mg, Oral, Nightly       PRN Meds     acetaminophen    aluminum-magnesium hydroxide-simethicone    atropine    senna-docusate sodium **AND** polyethylene glycol **AND** bisacodyl **AND** bisacodyl    Calcium Replacement - Follow Nurse / BPA Driven Protocol    dextrose    dextrose    glucagon (human recombinant)    Magnesium Standard Dose Replacement - Follow Nurse / BPA Driven Protocol    Morphine    nitroglycerin    ondansetron    Phosphorus Replacement - Follow Nurse / BPA Driven Protocol    Potassium Replacement - Follow Nurse / BPA Driven Protocol    [COMPLETED] Insert Peripheral IV **AND** sodium chloride    sodium chloride    sodium chloride    sodium chloride   Infusions  nitroglycerin, 5-200 mcg/min, Last Rate: 5 mcg/min (12/24/23 1429)          Diagnostic Data    Results from last 7 days   Lab Units 12/24/23  0436   WBC 10*3/mm3 8.90   HEMOGLOBIN g/dL 13.4   HEMATOCRIT % 39.3   PLATELETS 10*3/mm3 235   GLUCOSE mg/dL 162*   CREATININE mg/dL 0.78   BUN mg/dL 18   SODIUM mmol/L 142   POTASSIUM mmol/L 3.3*   ANION GAP mmol/L 11.0       No radiology results for the last day      I reviewed the patient's new clinical results.    Assessment/Plan:   his is a 68 years old male with a past medical history of CAD status post 4 stents, hypertension, hyperlipidemia and diabetes who presented to the ED with complaints of chest pain, dyspnea on exertion and shortness of breath.  He was found to have an abnormal stress test and he is status post cardiac catheterization with 1 stent placed.        CAD status post PCI  -Has had 4 stents  placed in the past, required 1 stent today.  -Cardiology following, appreciate recommendations.  -Continue aspirin, Plavix, atorvastatin, metoprolol and losartan.  -Continue to monitor.        Hypertension  -Blood pressure mildly evaded, will monitor for now, continue losartan and metoprolol.      Hyperlipidemia  Continue atorvastatin.  Might need to increase the dose given he is just on 10 mg        Diabetes mellitus  Blood glucose at goal, continue sliding scale insulin.        DVT prophylaxis: SCD  Full code  Continue patient level of care  Plan discussed with patient, nurse and family.              Time: 30 minutes        Signature: Electronically signed by Young Poon MD, 23, 17:19 EST.  [facility] Hospitalist Team    Electronically signed by Young Poon MD at 23 2920          Discharge Summary        Young Poon MD at 23 1038                       [facility] Medicine Services  Discharge Summary    Date of Service: 2023  Patient Name: Codey Rhodes  : 1955  MRN: [id number]    Date of Admission: 2023  Discharge Diagnosis:   79 status post PCI  Hypertension  Hyperlipidemia  Diabetes    Date of Discharge:  2023  Primary Care Physician: Ta Islas MD      Presenting Problem:   Chest pain [R07.9]  Chest pain, unspecified type [R07.9]    Active and Resolved Hospital Problems:  Active Hospital Problems    Diagnosis POA    **Chest pain [R07.9] Yes    Preop cardiovascular exam [Z01.810] Not Applicable    Pre-operative clearance [Z01.818] Not Applicable    Type 2 diabetes mellitus without complications [E11.9] Yes    Essential hypertension [I10] Yes    Mixed hyperlipidemia [E78.2] Yes    Coronary artery disease involving native coronary artery of native heart [I25.10] Yes    Borderline diabetes [R73.03] Yes    S/P blane [Z90.49] Not Applicable    S/P coronary artery stent placement [Z95.5] Not Applicable    Vocal cord polyps [J38.1] Yes       Resolved Hospital Problems   No resolved problems to display.         Hospital Course   Hospital Course:  This is a 68 years old male with a past medical history of CAD status post 4 stents, hypertension, hyperlipidemia and diabetes who presented to the ED with complaints of chest pain, dyspnea on exertion and shortness of breath.  He was admitted for further workup and management given his cardiac history.     Patient was seen by cardiology, was noted to have an abnormal stress test and is now status post cardiac catheterization with PCI and 1 stent placed.  Patient tolerated procedure well and is laying comfortably.     He was monitored overnight, remained hemodynamically stable and is been discharged home.  Patient is to continue Plavix uninterruptedly for minimum of 1 year, aspirin and follow-up with cardiology in 3 weeks.      Plavix was sent to patient's pharmacy which is closed today, he however got his today's dose of Plavix in the hospital and he is to  his Plavix for stent tomorrow morning and take tomorrow's dose.  Patient showed understanding and is in agreement with the plan.          DISCHARGE Follow Up Recommendations for labs and diagnostics:   None      Reasons For Change In Medications and Indications for New Medications:      Day of Discharge     Vital Signs:  Temp:  [97.4 °F (36.3 °C)-98.2 °F (36.8 °C)] 97.7 °F (36.5 °C)  Heart Rate:  [] 80  Resp:  [14-20] 15  BP: ()/() 151/76    Physical Exam:  Appearance: No apparent distress, non-toxic appearing   HEENT/Neck: Neck is supple, Extraocular movements intact, Moist mucous membranes,   Cardiovascular: Regular Rate and Rhythm,   Pulmonary: Clear to auscultation bilaterally anterior chest wall   Abdomen: BS+, Soft, non-tender, non-distended.   Ext: No Cyanosis, Clubbing, Edema.   Neuro: Non-focal, Alert & Oriented x 3,       Pertinent  and/or Most Recent Results     LAB RESULTS:      Lab 12/25/23  0811 12/24/23  0436  12/23/23  0449 12/22/23  1326   WBC 7.20 8.90 8.00 8.50   HEMOGLOBIN 12.5* 13.4 14.2 14.4   HEMATOCRIT 37.4* 39.3 42.2 42.5   PLATELETS 216 235 256 265   NEUTROS ABS 5.10 6.30 5.30 6.10   LYMPHS ABS 1.20 1.50 1.80 1.40   MONOS ABS 0.50 0.60 0.50 0.50   EOS ABS 0.40 0.40 0.50* 0.40   MCV 85.7 85.9 85.0 84.7   PROTIME  --   --   --  10.9   APTT  --   --   --  25.8*         Lab 12/25/23  0811 12/24/23  0436 12/23/23  0449 12/22/23  1326   SODIUM 139 142 139 138   POTASSIUM 4.1 3.3* 3.6 3.4*   CHLORIDE 102 103 100 99   CO2 27.0 28.0 29.0 34.0*   ANION GAP 10.0 11.0 10.0 5.0   BUN 15 18 17 17   CREATININE 0.83 0.78 0.77 0.92   EGFR 94.7 96.5 96.9 90.0   GLUCOSE 194* 162* 154* 183*   CALCIUM 9.1 8.8 9.2 9.7   MAGNESIUM 2.0  --   --  1.9   PHOSPHORUS 2.7  --   --   --    HEMOGLOBIN A1C  --   --  7.50*  --    TSH  --   --   --  0.894             Lab 12/22/23  1628 12/22/23  1326   PROBNP  --  178.2   HSTROP T 12 13   PROTIME  --  10.9   INR  --  1.00         Lab 12/25/23  0811 12/23/23  0449   CHOLESTEROL 102 129   LDL CHOL 49 69   HDL CHOL 31* 36*   TRIGLYCERIDES 118 134             Brief Urine Lab Results  (Last result in the past 365 days)        Color   Clarity   Blood   Leuk Est   Nitrite   Protein   CREAT   Urine HCG        09/20/23 1119 Yellow   Cloudy   Large (3+)   Moderate (2+)   Negative   100 mg/dL (2+)                 Microbiology Results (last 10 days)       ** No results found for the last 240 hours. **            XR Chest 1 View    Result Date: 12/22/2023  Impression: Impression: No acute cardiopulmonary findings. Electronically Signed: Surya Rai MD  12/22/2023 1:57 PM EST  Workstation ID: [id number]           Labs Pending at Discharge:  None      Procedures Performed  Procedure(s):  LEFT HEART CATH with possible PCI         Consults:   Consults       Date and Time Order Name Status Description    12/24/2023 11:40 AM Inpatient Hospitalist Consult Completed     12/22/2023  4:35 PM Inpatient Cardiology  Consult                Discharge Details        Discharge Medications        New Medications        Instructions Start Date   clopidogrel 75 MG tablet  Commonly known as: PLAVIX   75 mg, Oral, Daily   Start Date: December 26, 2023            Continue These Medications        Instructions Start Date   amLODIPine 10 MG tablet  Commonly known as: NORVASC   10 mg, Oral, Daily      aspirin 81 MG EC tablet   81 mg, Oral, Daily      losartan 100 MG tablet  Commonly known as: COZAAR   100 mg, Oral, Nightly      metoprolol succinate XL 50 MG 24 hr tablet  Commonly known as: TOPROL-XL   1 tablet, Oral, Daily      nitroglycerin 0.4 MG SL tablet  Commonly known as: NITROSTAT   1 under the tongue as needed for angina, may repeat q5mins for up three doses      pravastatin 40 MG tablet  Commonly known as: PRAVACHOL   40 mg, Oral, Nightly      terazosin 5 MG capsule  Commonly known as: HYTRIN   5 mg, Oral, Nightly               Allergies   Allergen Reactions    Crestor [Rosuvastatin] Myalgia         Discharge Disposition:     Home or Self Care    Diet:  Hospital:  Diet Order   Procedures    Diet: Cardiac Diets; Healthy Heart (2-3 Na+); Texture: Regular Texture (IDDSI 7); Fluid Consistency: Thin (IDDSI 0)         Discharge Activity:   Activity Instructions       Activity as Tolerated                CODE STATUS:  Code Status and Medical Interventions:   Ordered at: 12/22/23 1626     Level Of Support Discussed With:    Patient     Code Status (Patient has no pulse and is not breathing):    CPR (Attempt to Resuscitate)     Medical Interventions (Patient has pulse or is breathing):    Full Support         Future Appointments   Date Time Provider Department Center   3/12/2024  1:30 PM Zak Cantu MD [facility]       Additional Instructions for the Follow-ups that You Need to Schedule       Discharge Follow-up with PCP   As directed       Currently Documented PCP:    Ta Islas MD    PCP Phone Number:     [phone]     Follow Up Details: PCP follow-up        Discharge Follow-up with Specified Provider: Follow-up with cardiology in 3 weeks; 3 Weeks   As directed      To: Follow-up with cardiology in 3 weeks   Follow Up: 3 Weeks                Time spent on Discharge including face to face service:  >30 minutes    Signature: Electronically signed by Young Poon MD, 12/25/23, 10:38 EST.  [facility]    Electronically signed by Young Poon MD at 12/25/23 1047

## 2023-12-26 NOTE — CASE MANAGEMENT/SOCIAL WORK
Case Management Discharge Note      Final Note: Routine home         Selected Continued Care - Discharged on 12/25/2023 Admission date: 12/22/2023 - Discharge disposition: Home or Self Care         Transportation Services  Private: Car    Final Discharge Disposition Code: 01 - home or self-care

## 2023-12-28 ENCOUNTER — READMISSION MANAGEMENT (OUTPATIENT)
Dept: CALL CENTER | Facility: HOSPITAL | Age: 68
End: 2023-12-28
Payer: MEDICARE

## 2023-12-28 NOTE — OUTREACH NOTE
Medical Week 1 Survey      Flowsheet Row Responses   [facility] facility patient discharged from? [place]   Does the patient have one of the following disease processes/diagnoses(primary or secondary)? Other   Week 1 attempt successful? No   Unsuccessful attempts Attempt 1            Marcia MENA - Registered Nurse

## 2024-01-04 ENCOUNTER — READMISSION MANAGEMENT (OUTPATIENT)
Dept: CALL CENTER | Facility: HOSPITAL | Age: 69
End: 2024-01-04
Payer: MEDICARE

## 2024-01-04 NOTE — OUTREACH NOTE
Medical Week 1 Survey      Flowsheet Row Responses   [facility] patient discharged from? [place]   Does the patient have one of the following disease processes/diagnoses(primary or secondary)? Other   Week 1 attempt successful? Yes   Call start time 0826   Call end time 0827   Discharge diagnosis Chest pain   Meds reviewed with patient/caregiver? Yes   Is the patient having any side effects they believe may be caused by any medication additions or changes? No   Does the patient have all medications ordered at discharge? Yes   Is the patient taking all medications as directed (includes completed medication regime)? Yes   Does the patient have a primary care provider?  Yes   Does the patient have an appointment with their PCP within 7 days of discharge? Yes   Has the patient kept scheduled appointments due by today? N/A   Has home health visited the patient within 72 hours of discharge? N/A   Psychosocial issues? No   Did the patient receive a copy of their discharge instructions? Yes   Nursing interventions Reviewed instructions with patient   What is the patient's perception of their health status since discharge? Improving   Week 1 call completed? Yes   Graduated Yes   Call end time 0827            Marcia MENA - Registered Nurse

## 2024-01-05 LAB
QT INTERVAL: 368 MS
QT INTERVAL: 373 MS
QT INTERVAL: 374 MS
QTC INTERVAL: 407 MS
QTC INTERVAL: 415 MS
QTC INTERVAL: 425 MS

## 2024-01-22 ENCOUNTER — OFFICE VISIT (OUTPATIENT)
Dept: CARDIOLOGY | Facility: CLINIC | Age: 69
End: 2024-01-22
Payer: MEDICARE

## 2024-01-22 VITALS
WEIGHT: 246 LBS | OXYGEN SATURATION: 98 % | BODY MASS INDEX: 37.28 KG/M2 | SYSTOLIC BLOOD PRESSURE: 154 MMHG | RESPIRATION RATE: 16 BRPM | DIASTOLIC BLOOD PRESSURE: 84 MMHG | HEART RATE: 76 BPM | HEIGHT: 68 IN

## 2024-01-22 DIAGNOSIS — I25.10 CORONARY ARTERY DISEASE INVOLVING NATIVE CORONARY ARTERY OF NATIVE HEART WITHOUT ANGINA PECTORIS: Primary | ICD-10-CM

## 2024-01-22 DIAGNOSIS — I10 ESSENTIAL HYPERTENSION: ICD-10-CM

## 2024-01-22 DIAGNOSIS — E78.2 MIXED HYPERLIPIDEMIA: ICD-10-CM

## 2024-01-22 DIAGNOSIS — Z95.5 S/P CORONARY ARTERY STENT PLACEMENT: ICD-10-CM

## 2024-01-22 PROCEDURE — 99214 OFFICE O/P EST MOD 30 MIN: CPT | Performed by: STUDENT IN AN ORGANIZED HEALTH CARE EDUCATION/TRAINING PROGRAM

## 2024-01-22 PROCEDURE — 1160F RVW MEDS BY RX/DR IN RCRD: CPT | Performed by: STUDENT IN AN ORGANIZED HEALTH CARE EDUCATION/TRAINING PROGRAM

## 2024-01-22 PROCEDURE — 93000 ELECTROCARDIOGRAM COMPLETE: CPT | Performed by: STUDENT IN AN ORGANIZED HEALTH CARE EDUCATION/TRAINING PROGRAM

## 2024-01-22 PROCEDURE — 3079F DIAST BP 80-89 MM HG: CPT | Performed by: STUDENT IN AN ORGANIZED HEALTH CARE EDUCATION/TRAINING PROGRAM

## 2024-01-22 PROCEDURE — 1159F MED LIST DOCD IN RCRD: CPT | Performed by: STUDENT IN AN ORGANIZED HEALTH CARE EDUCATION/TRAINING PROGRAM

## 2024-01-22 PROCEDURE — 3077F SYST BP >= 140 MM HG: CPT | Performed by: STUDENT IN AN ORGANIZED HEALTH CARE EDUCATION/TRAINING PROGRAM

## 2024-03-12 ENCOUNTER — TELEPHONE (OUTPATIENT)
Dept: CARDIOLOGY | Facility: CLINIC | Age: 69
End: 2024-03-12

## 2024-03-12 NOTE — TELEPHONE ENCOUNTER
Caller: Codey Rhodes    Relationship to patient: Self    Best call back number: [phone]     Chief complaint: NA    Type of visit: 2MONTH FU    Requested date: ASAP      If rescheduling, when is the original appointment: 03.12.24     Additional notes:PT WAS ATTEMPTING TO PREPARE FOR APPT AND IS HAVING CAR TROUBLE - PT ASKING TO RSD AND NEXT AVAIL IS JUNE - PT SEEKING ADVISEMENT FOR SOMETHING SOONER

## 2024-03-20 ENCOUNTER — APPOINTMENT (OUTPATIENT)
Dept: ULTRASOUND IMAGING | Facility: HOSPITAL | Age: 69
End: 2024-03-20
Payer: MEDICARE

## 2024-03-20 ENCOUNTER — HOSPITAL ENCOUNTER (EMERGENCY)
Facility: HOSPITAL | Age: 69
Discharge: HOME | End: 2024-03-20
Attending: EMERGENCY MEDICINE | Admitting: EMERGENCY MEDICINE
Payer: MEDICARE

## 2024-03-20 ENCOUNTER — APPOINTMENT (OUTPATIENT)
Dept: GENERAL RADIOLOGY | Facility: HOSPITAL | Age: 69
End: 2024-03-20
Payer: MEDICARE

## 2024-03-20 VITALS
HEART RATE: 70 BPM | BODY MASS INDEX: 37.39 KG/M2 | TEMPERATURE: 97.8 F | DIASTOLIC BLOOD PRESSURE: 74 MMHG | HEIGHT: 68 IN | WEIGHT: 246.69 LBS | RESPIRATION RATE: 20 BRPM | OXYGEN SATURATION: 100 % | SYSTOLIC BLOOD PRESSURE: 151 MMHG

## 2024-03-20 DIAGNOSIS — R30.0 DYSURIA: ICD-10-CM

## 2024-03-20 DIAGNOSIS — R10.31 RIGHT INGUINAL PAIN: Primary | ICD-10-CM

## 2024-03-20 LAB
ALBUMIN SERPL-MCNC: 4.4 G/DL (ref 3.5–5.2)
ALBUMIN/GLOB SERPL: 1.8 G/DL
ALP SERPL-CCNC: 77 U/L (ref 39–117)
ALT SERPL W P-5'-P-CCNC: 15 U/L (ref 1–41)
ANION GAP SERPL CALCULATED.3IONS-SCNC: 10 MMOL/L (ref 5–15)
AST SERPL-CCNC: 12 U/L (ref 1–40)
BASOPHILS # BLD AUTO: 0.04 10*3/MM3 (ref 0–0.2)
BASOPHILS NFR BLD AUTO: 0.5 % (ref 0–1.5)
BILIRUB SERPL-MCNC: 0.3 MG/DL (ref 0–1.2)
BILIRUB UR QL STRIP: NEGATIVE
BUN SERPL-MCNC: 26 MG/DL (ref 8–23)
BUN/CREAT SERPL: 22.4 (ref 7–25)
CALCIUM SPEC-SCNC: 9.9 MG/DL (ref 8.6–10.5)
CHLORIDE SERPL-SCNC: 95 MMOL/L (ref 98–107)
CLARITY UR: CLEAR
CO2 SERPL-SCNC: 31 MMOL/L (ref 22–29)
COLOR UR: YELLOW
CREAT SERPL-MCNC: 1.16 MG/DL (ref 0.76–1.27)
DEPRECATED RDW RBC AUTO: 41.3 FL (ref 37–54)
EGFRCR SERPLBLD CKD-EPI 2021: 67.8 ML/MIN/1.73
EOSINOPHIL # BLD AUTO: 0.34 10*3/MM3 (ref 0–0.4)
EOSINOPHIL NFR BLD AUTO: 3.9 % (ref 0.3–6.2)
ERYTHROCYTE [DISTWIDTH] IN BLOOD BY AUTOMATED COUNT: 13.1 % (ref 12.3–15.4)
FLUAV RNA RESP QL NAA+PROBE: NOT DETECTED
FLUBV RNA RESP QL NAA+PROBE: NOT DETECTED
GEN 5 2HR TROPONIN T REFLEX: 14 NG/L
GLOBULIN UR ELPH-MCNC: 2.5 GM/DL
GLUCOSE SERPL-MCNC: 320 MG/DL (ref 65–99)
GLUCOSE UR STRIP-MCNC: ABNORMAL MG/DL
HCT VFR BLD AUTO: 40.6 % (ref 37.5–51)
HGB BLD-MCNC: 13.7 G/DL (ref 13–17.7)
HGB UR QL STRIP.AUTO: NEGATIVE
IMM GRANULOCYTES # BLD AUTO: 0.03 10*3/MM3 (ref 0–0.05)
IMM GRANULOCYTES NFR BLD AUTO: 0.3 % (ref 0–0.5)
KETONES UR QL STRIP: NEGATIVE
LEUKOCYTE ESTERASE UR QL STRIP.AUTO: NEGATIVE
LYMPHOCYTES # BLD AUTO: 1.16 10*3/MM3 (ref 0.7–3.1)
LYMPHOCYTES NFR BLD AUTO: 13.4 % (ref 19.6–45.3)
MAGNESIUM SERPL-MCNC: 2 MG/DL (ref 1.6–2.4)
MCH RBC QN AUTO: 29.1 PG (ref 26.6–33)
MCHC RBC AUTO-ENTMCNC: 33.7 G/DL (ref 31.5–35.7)
MCV RBC AUTO: 86.4 FL (ref 79–97)
MONOCYTES # BLD AUTO: 0.56 10*3/MM3 (ref 0.1–0.9)
MONOCYTES NFR BLD AUTO: 6.5 % (ref 5–12)
NEUTROPHILS NFR BLD AUTO: 6.52 10*3/MM3 (ref 1.7–7)
NEUTROPHILS NFR BLD AUTO: 75.4 % (ref 42.7–76)
NITRITE UR QL STRIP: NEGATIVE
NRBC BLD AUTO-RTO: 0 /100 WBC (ref 0–0.2)
NT-PROBNP SERPL-MCNC: 192.7 PG/ML (ref 0–900)
PH UR STRIP.AUTO: 5.5 [PH] (ref 5–8)
PLATELET # BLD AUTO: 231 10*3/MM3 (ref 140–450)
PMV BLD AUTO: 9.9 FL (ref 6–12)
POTASSIUM SERPL-SCNC: 3.3 MMOL/L (ref 3.5–5.2)
PROT SERPL-MCNC: 6.9 G/DL (ref 6–8.5)
PROT UR QL STRIP: NEGATIVE
RBC # BLD AUTO: 4.7 10*6/MM3 (ref 4.14–5.8)
RSV RNA RESP QL NAA+PROBE: NOT DETECTED
SARS-COV-2 RNA RESP QL NAA+PROBE: NOT DETECTED
SODIUM SERPL-SCNC: 136 MMOL/L (ref 136–145)
SP GR UR STRIP: 1.02 (ref 1–1.03)
TROPONIN T DELTA: -1 NG/L
TROPONIN T SERPL HS-MCNC: 15 NG/L
UROBILINOGEN UR QL STRIP: ABNORMAL
WBC NRBC COR # BLD AUTO: 8.65 10*3/MM3 (ref 3.4–10.8)
WHOLE BLOOD HOLD COAG: NORMAL

## 2024-03-20 PROCEDURE — 83735 ASSAY OF MAGNESIUM: CPT

## 2024-03-20 PROCEDURE — 85025 COMPLETE CBC W/AUTO DIFF WBC: CPT

## 2024-03-20 PROCEDURE — 99284 EMERGENCY DEPT VISIT MOD MDM: CPT

## 2024-03-20 PROCEDURE — 36415 COLL VENOUS BLD VENIPUNCTURE: CPT

## 2024-03-20 PROCEDURE — 83880 ASSAY OF NATRIURETIC PEPTIDE: CPT

## 2024-03-20 PROCEDURE — 93005 ELECTROCARDIOGRAM TRACING: CPT

## 2024-03-20 PROCEDURE — 80053 COMPREHEN METABOLIC PANEL: CPT

## 2024-03-20 PROCEDURE — 87637 SARSCOV2&INF A&B&RSV AMP PRB: CPT

## 2024-03-20 PROCEDURE — 76857 US EXAM PELVIC LIMITED: CPT

## 2024-03-20 PROCEDURE — 71045 X-RAY EXAM CHEST 1 VIEW: CPT

## 2024-03-20 PROCEDURE — 81003 URINALYSIS AUTO W/O SCOPE: CPT

## 2024-03-20 PROCEDURE — 84484 ASSAY OF TROPONIN QUANT: CPT

## 2024-03-20 RX ORDER — POTASSIUM CHLORIDE 20 MEQ/1
40 TABLET, EXTENDED RELEASE ORAL ONCE
Status: COMPLETED | OUTPATIENT
Start: 2024-03-20 | End: 2024-03-20

## 2024-03-20 RX ORDER — SODIUM CHLORIDE 0.9 % (FLUSH) 0.9 %
10 SYRINGE (ML) INJECTION AS NEEDED
Status: DISCONTINUED | OUTPATIENT
Start: 2024-03-20 | End: 2024-03-20 | Stop reason: HOSPADM

## 2024-03-20 RX ADMIN — POTASSIUM CHLORIDE 40 MEQ: 1500 TABLET, EXTENDED RELEASE ORAL at 13:56

## 2024-03-20 NOTE — DISCHARGE INSTRUCTIONS
Continue taking previously prescribed on occasion.  Get plenty fluids.  Rest.  Take Tylenol or ibuprofen as needed for pain and discomfort.    Follow-up with urology for further evaluation and management.  Follow-up with primary care provider as needed.    Return to the ER for new or worsening symptoms.

## 2024-03-20 NOTE — ED PROVIDER NOTES
Subjective   History of Present Illness  Patient is a 69-year-old  male with a history of diabetes, coronary artery disease and hypertension who presents the emergency room with complaints of fever, chills and bodyaches that started 3 days ago.  He reports history of UTIs but has had no dysuria, issues with bladder emptying or flank pain.  He does report an intermittent mild pain in his right groin with no testicular swelling.  His blood sugar has been elevated this morning but patient states that he is only been managing his diabetes with diet.  Patient reports that he picked up smoking again, using between 2 and 8 cigarettes daily.  He has had no significant leg swelling, cough, chest pain or vomiting.  He has had no recent changes in medications.     PCP: Janel  Cardio: Pepe  Uro: aHlley      Review of Systems   Constitutional:  Positive for chills and fever.   HENT:  Negative for congestion.    Respiratory:  Negative for cough.    Cardiovascular:  Negative for chest pain.   Gastrointestinal:  Negative for abdominal pain.   Genitourinary:  Negative for decreased urine volume and scrotal swelling.   Musculoskeletal:  Positive for myalgias.   Neurological:  Negative for syncope and headaches.   Psychiatric/Behavioral:  The patient is not nervous/anxious.    All other systems reviewed and are negative.      Past Medical History:   Diagnosis Date    Coronary artery disease involving native coronary artery of native heart 12/8/2016    PCI 12/1/12006 PCI RCA 12/14/16 PCI to LAD 11/2018    Hypertension     Mixed hyperlipidemia 11/19/2019    Type 2 diabetes mellitus without complications 11/19/2019       Allergies   Allergen Reactions    Crestor [Rosuvastatin] Myalgia       Past Surgical History:   Procedure Laterality Date    CARDIAC CATHETERIZATION      CARDIAC CATHETERIZATION Right 12/24/2023    Procedure: LEFT HEART CATH with possible PCI;  Surgeon: Alcira Garcia MD;  Location: [facility]  "INVASIVE LOCATION;  Service: Cardiovascular;  Laterality: Right;  Local and IV sedation    CORONARY STENT PLACEMENT         Family History   Problem Relation Age of Onset    Heart disease Mother     COPD Mother     Cancer Mother     No Known Problems Father        Social History     Socioeconomic History    Marital status:    Tobacco Use    Smoking status: Every Day     Current packs/day: 0.00     Types: Cigarettes     Last attempt to quit:      Years since quittin.2    Smokeless tobacco: Never    Tobacco comments:     Patient advised to stop smoking   Vaping Use    Vaping status: Never Used   Substance and Sexual Activity    Alcohol use: Not Currently    Drug use: Never    Sexual activity: Defer           Objective   Physical Exam  Vitals and nursing note reviewed.   Constitutional:       General: He is not in acute distress.     Appearance: Normal appearance. He is obese. He is not ill-appearing.   HENT:      Head: Normocephalic and atraumatic.   Cardiovascular:      Rate and Rhythm: Normal rate and regular rhythm.      Heart sounds: Normal heart sounds. No murmur heard.  Pulmonary:      Effort: No respiratory distress.      Breath sounds: Normal breath sounds.   Abdominal:      General: Bowel sounds are normal.      Tenderness: There is no abdominal tenderness.   Musculoskeletal:         General: Normal range of motion.   Skin:     General: Skin is warm.      Coloration: Skin is pale.   Neurological:      Mental Status: He is alert and oriented to person, place, and time.         Procedures           ED Course      /74   Pulse 70   Temp 97.8 °F (36.6 °C) (Oral)   Resp 20   Ht 172.7 cm (68\")   Wt 112 kg (246 lb 11.1 oz)   SpO2 100%   BMI 37.51 kg/m²   Labs Reviewed   COMPREHENSIVE METABOLIC PANEL - Abnormal; Notable for the following components:       Result Value    Glucose 320 (*)     BUN 26 (*)     Potassium 3.3 (*)     Chloride 95 (*)     CO2 31.0 (*)     All other components " within normal limits    Narrative:     GFR Normal >60  Chronic Kidney Disease <60  Kidney Failure <15     URINALYSIS W/ MICROSCOPIC IF INDICATED (NO CULTURE) - Abnormal; Notable for the following components:    Glucose, UA >=1000 mg/dL (3+) (*)     All other components within normal limits    Narrative:     Urine microscopic not indicated.   CBC WITH AUTO DIFFERENTIAL - Abnormal; Notable for the following components:    Lymphocyte % 13.4 (*)     All other components within normal limits   COVID-19/FLUA&B/RSV, NP SWAB IN TRANSPORT MEDIA 1 HR TAT - Normal    Narrative:     Fact sheet for providers: https://www.fda.gov/media/331017/download    Fact sheet for patients: https://www.fda.gov/media/948149/download    Test performed by PCR.   BNP (IN-HOUSE) - Normal    Narrative:     This assay is used as an aid in the diagnosis of individuals suspected of having heart failure. It can be used as an aid in the diagnosis of acute decompensated heart failure (ADHF) in patients presenting with signs and symptoms of ADHF to the emergency department (ED). In addition, NT-proBNP of <300 pg/mL indicates ADHF is not likely.    Age Range Result Interpretation  NT-proBNP Concentration (pg/mL:      <50             Positive            >450                   Gray                 300-450                    Negative             <300    50-75           Positive            >900                  Gray                300-900                  Negative            <300      >75             Positive            >1800                  Gray                300-1800                  Negative            <300   MAGNESIUM - Normal   TROPONIN - Normal    Narrative:     High Sensitive Troponin T Reference Range:  <14.0 ng/L- Negative Female for AMI  <22.0 ng/L- Negative Male for AMI  >=14 - Abnormal Female indicating possible myocardial injury.  >=22 - Abnormal Male indicating possible myocardial injury.   Clinicians would have to utilize clinical acumen,  EKG, Troponin, and serial changes to determine if it is an Acute Myocardial Infarction or myocardial injury due to an underlying chronic condition.        HIGH SENSITIVITIY TROPONIN T 2HR - Normal    Narrative:     High Sensitive Troponin T Reference Range:  <14.0 ng/L- Negative Female for AMI  <22.0 ng/L- Negative Male for AMI  >=14 - Abnormal Female indicating possible myocardial injury.  >=22 - Abnormal Male indicating possible myocardial injury.   Clinicians would have to utilize clinical acumen, EKG, Troponin, and serial changes to determine if it is an Acute Myocardial Infarction or myocardial injury due to an underlying chronic condition.        CBC AND DIFFERENTIAL    Narrative:     The following orders were created for panel order CBC & Differential.  Procedure                               Abnormality         Status                     ---------                               -----------         ------                     CBC Auto Differential[[id number]]        Abnormal            Final result                 Please view results for these tests on the individual orders.   EXTRA TUBES    Narrative:     The following orders were created for panel order Extra Tubes.  Procedure                               Abnormality         Status                     ---------                               -----------         ------                     Light Blue Top[[id number]]                                   Final result                 Please view results for these tests on the individual orders.   LIGHT BLUE TOP     Medications   sodium chloride 0.9 % flush 10 mL (has no administration in time range)   potassium chloride (KLOR-CON M20) CR tablet 40 mEq (40 mEq Oral Given 3/20/24 1356)     US Scrotum & Testicles    Result Date: 3/20/2024  Impression: No acute process nor significant abnormality identified. Electronically Signed: James Carter MD  3/20/2024 4:31 PM EDT  Workstation ID: [id number]     Pelvis  Limited    Result Date: 3/20/2024  Impression: No acute process nor significant abnormality identified. Electronically Signed: James Carter MD  3/20/2024 4:31 PM EDT  Workstation ID: [id number]    XR Chest 1 View    Result Date: 3/20/2024  Impression: No acute process identified. Electronically Signed: James Carter MD  3/20/2024 12:41 PM EDT  Workstation ID: [id number]                                          Medical Decision Making  Problems Addressed:  Dysuria: complicated acute illness or injury  Right inguinal pain: complicated acute illness or injury    Amount and/or Complexity of Data Reviewed  Labs: ordered. Decision-making details documented in ED Course.  Radiology: ordered. Decision-making details documented in ED Course.  ECG/medicine tests: ordered.     Details: My interpretation of EKG reveals sinus rhythm with a regular rate of 80.  No acute ST elevation or ectopy.  No significant change from previous study completed 12/25/2023.  EKG was also reviewed by Dr. Rubin, who is agreeable to my interpretation.    Risk  Prescription drug management.    Patient is a pleasant 69-year-old obese  male with a history of diabetes, CAD and hypertension who presents the emergency room from home with complaints of fever and chills with bodyaches for the past 3 days.  On exam, patient has normal S1-S2 without clicks murmurs.  No JVD or leg swelling.  Lungs are wheezy in all areas without crackles or stridor.  Abdomen is soft, protuberant and nontender with normal bowel sounds throughout.  No CVA tenderness.  Initial differentials include acute UTI, COPD exacerbation, COVID-19, pneumonia.  This is not a complete list.    Due to overwhelming hospital census and boarding inpatients in the emergency room, patient received above examination in semiprivate staging bed.  Examination was made to the best of provider's ability with respect to patient privacy and confidentiality.  IV was established labs were obtained.   Patient received above examination.  I considered Solu-Medrol or other steroid the patient is diabetic and IV steroids held at this time.  CBC was found to be unremarkable.  CMP revealed potassium of 3.3.  Magnesium is within normal limits and patient received oral potassium supplementation.  No COVID, influenza or RSV detected on swab.  His urinalysis is negative for findings consistent with acute UTI.  He is not fluid overloaded and has a normal BNP.  My interpretation of chest x-ray reveals no infiltrates, nodules or pneumothorax.  This is concurrent with radiologist.  I reviewed ultrasound as well and found to be unremarkable for testicular torsion or other acute abnormality.  This did concur with radiologist.  Troponin is normal x2.  Upon my reassessment, patient continues to complain of right groin pain but has not had issues with dysuria, bladder emptying or frequency.  Results were discussed with the patient and he was encouraged to follow-up to primary care provider and urologist.  I explained to him that there was not blood in his urine concerning for passing stone but that follow-up may still be important.  Additionally, patient was advised to continue taking previously prescribed medications.  He verbalized understanding and is agreeable to plan of care.  Patient has remained hemodynamically stable and is in no acute distress.  Patient is ambulatory upright, steadily and without assistance upon discharge.    I discussed the findings with patient who voices understanding of discharge instructions, signs and symptoms requiring return to the ED; discharged improved and stable condition with follow-up for reevaluation.    Patient is aware that discharge does not mean that nothing is wrong but it indicates no emergency is present and they must continue care with follow-up as given below or physician of their choice.    This document is intended for medical expert use only.  Reading of this document by  patients and/or patient's family without participating medical staff guidance may result in misinterpretation and unintended morbidity.  Any interpretation of such data is the responsibility of the patient and/or family member responsible for the patient in concert with their primary or specialist providers, not to be left for sources of online search as such as [employer], [employer] or similar queries.  Relying on these approaches to knowledge may result in misinterpretation, misguided goals of care and even death should patient or family members try recommendations outside of the realm of professional medical care in a supervised inpatient environment.    This medical document was created using Dragon dictation system. Some errors in speech recognition may occur.    Final diagnoses:   Right inguinal pain   Dysuria       ED Disposition  ED Disposition       ED Disposition   Discharge    Condition   Stable    Comment   --               Ta Islas MD  [address] [facility] [place] [address] [phone]          Cliff Rowley MD  [address] [place] [address] [place] [address] [phone]               Medication List      No changes were made to your prescriptions during this visit.            Jolly Calvillo, APRN  03/20/24 0971

## 2024-03-21 LAB
QT INTERVAL: 355 MS
QTC INTERVAL: 409 MS

## 2024-04-16 ENCOUNTER — APPOINTMENT (OUTPATIENT)
Dept: CT IMAGING | Facility: HOSPITAL | Age: 69
End: 2024-04-16
Payer: MEDICARE

## 2024-04-16 ENCOUNTER — HOSPITAL ENCOUNTER (OUTPATIENT)
Facility: HOSPITAL | Age: 69
Discharge: HOME | End: 2024-04-18
Attending: EMERGENCY MEDICINE | Admitting: STUDENT IN AN ORGANIZED HEALTH CARE EDUCATION/TRAINING PROGRAM
Payer: MEDICARE

## 2024-04-16 DIAGNOSIS — R68.83 CHILLS: ICD-10-CM

## 2024-04-16 DIAGNOSIS — N39.0 URINARY TRACT INFECTION WITHOUT HEMATURIA, SITE UNSPECIFIED: ICD-10-CM

## 2024-04-16 DIAGNOSIS — N21.1 URETHRAL STONE: Primary | ICD-10-CM

## 2024-04-16 DIAGNOSIS — N17.9 AKI (ACUTE KIDNEY INJURY): ICD-10-CM

## 2024-04-16 DIAGNOSIS — E87.6 HYPOKALEMIA: ICD-10-CM

## 2024-04-16 DIAGNOSIS — N20.9 CALCULUS (=STONE): ICD-10-CM

## 2024-04-16 PROBLEM — R31.9 HEMATURIA: Status: ACTIVE | Noted: 2024-04-16

## 2024-04-16 LAB
ALBUMIN SERPL-MCNC: 4 G/DL (ref 3.5–5.2)
ALBUMIN/GLOB SERPL: 1.2 G/DL
ALP SERPL-CCNC: 71 U/L (ref 39–117)
ALT SERPL W P-5'-P-CCNC: 19 U/L (ref 1–41)
ANION GAP SERPL CALCULATED.3IONS-SCNC: 12 MMOL/L (ref 5–15)
AST SERPL-CCNC: 18 U/L (ref 1–40)
BACTERIA UR QL AUTO: ABNORMAL /HPF
BASOPHILS # BLD AUTO: 0.03 10*3/MM3 (ref 0–0.2)
BASOPHILS NFR BLD AUTO: 0.3 % (ref 0–1.5)
BILIRUB SERPL-MCNC: 0.5 MG/DL (ref 0–1.2)
BILIRUB UR QL STRIP: NEGATIVE
BUN SERPL-MCNC: 36 MG/DL (ref 8–23)
BUN/CREAT SERPL: 24.8 (ref 7–25)
CALCIUM SPEC-SCNC: 9.5 MG/DL (ref 8.6–10.5)
CHLORIDE SERPL-SCNC: 93 MMOL/L (ref 98–107)
CLARITY UR: ABNORMAL
CO2 SERPL-SCNC: 31 MMOL/L (ref 22–29)
COLOR UR: YELLOW
CREAT SERPL-MCNC: 1.45 MG/DL (ref 0.76–1.27)
D-LACTATE SERPL-SCNC: 1 MMOL/L (ref 0.3–2)
DEPRECATED RDW RBC AUTO: 40.7 FL (ref 37–54)
EGFRCR SERPLBLD CKD-EPI 2021: 51.8 ML/MIN/1.73
EOSINOPHIL # BLD AUTO: 0.43 10*3/MM3 (ref 0–0.4)
EOSINOPHIL NFR BLD AUTO: 4.6 % (ref 0.3–6.2)
ERYTHROCYTE [DISTWIDTH] IN BLOOD BY AUTOMATED COUNT: 13.2 % (ref 12.3–15.4)
FLUAV SUBTYP SPEC NAA+PROBE: NOT DETECTED
FLUBV RNA ISLT QL NAA+PROBE: NOT DETECTED
GLOBULIN UR ELPH-MCNC: 3.4 GM/DL
GLUCOSE SERPL-MCNC: 163 MG/DL (ref 65–99)
GLUCOSE UR STRIP-MCNC: NEGATIVE MG/DL
HCT VFR BLD AUTO: 37.4 % (ref 37.5–51)
HGB BLD-MCNC: 12.8 G/DL (ref 13–17.7)
HGB UR QL STRIP.AUTO: ABNORMAL
HYALINE CASTS UR QL AUTO: ABNORMAL /LPF
IMM GRANULOCYTES # BLD AUTO: 0.03 10*3/MM3 (ref 0–0.05)
IMM GRANULOCYTES NFR BLD AUTO: 0.3 % (ref 0–0.5)
KETONES UR QL STRIP: NEGATIVE
LEUKOCYTE ESTERASE UR QL STRIP.AUTO: ABNORMAL
LIPASE SERPL-CCNC: 49 U/L (ref 13–60)
LYMPHOCYTES # BLD AUTO: 1.34 10*3/MM3 (ref 0.7–3.1)
LYMPHOCYTES NFR BLD AUTO: 14.4 % (ref 19.6–45.3)
MAGNESIUM SERPL-MCNC: 2.4 MG/DL (ref 1.6–2.4)
MCH RBC QN AUTO: 28.9 PG (ref 26.6–33)
MCHC RBC AUTO-ENTMCNC: 34.2 G/DL (ref 31.5–35.7)
MCV RBC AUTO: 84.4 FL (ref 79–97)
MONOCYTES # BLD AUTO: 0.71 10*3/MM3 (ref 0.1–0.9)
MONOCYTES NFR BLD AUTO: 7.6 % (ref 5–12)
NEUTROPHILS NFR BLD AUTO: 6.78 10*3/MM3 (ref 1.7–7)
NEUTROPHILS NFR BLD AUTO: 72.8 % (ref 42.7–76)
NITRITE UR QL STRIP: NEGATIVE
NRBC BLD AUTO-RTO: 0 /100 WBC (ref 0–0.2)
PH UR STRIP.AUTO: <=5 [PH] (ref 5–8)
PLATELET # BLD AUTO: 234 10*3/MM3 (ref 140–450)
PMV BLD AUTO: 9.8 FL (ref 6–12)
POTASSIUM SERPL-SCNC: 3 MMOL/L (ref 3.5–5.2)
PROT SERPL-MCNC: 7.4 G/DL (ref 6–8.5)
PROT UR QL STRIP: NEGATIVE
RBC # BLD AUTO: 4.43 10*6/MM3 (ref 4.14–5.8)
RBC # UR STRIP: ABNORMAL /HPF
REF LAB TEST METHOD: ABNORMAL
SARS-COV-2 RNA RESP QL NAA+PROBE: NOT DETECTED
SODIUM SERPL-SCNC: 136 MMOL/L (ref 136–145)
SP GR UR STRIP: 1.02 (ref 1–1.03)
SQUAMOUS #/AREA URNS HPF: ABNORMAL /HPF
UROBILINOGEN UR QL STRIP: ABNORMAL
WBC # UR STRIP: ABNORMAL /HPF
WBC NRBC COR # BLD AUTO: 9.32 10*3/MM3 (ref 3.4–10.8)

## 2024-04-16 PROCEDURE — 83735 ASSAY OF MAGNESIUM: CPT | Performed by: PHYSICIAN ASSISTANT

## 2024-04-16 PROCEDURE — G0378 HOSPITAL OBSERVATION PER HR: HCPCS

## 2024-04-16 PROCEDURE — 81001 URINALYSIS AUTO W/SCOPE: CPT | Performed by: PHYSICIAN ASSISTANT

## 2024-04-16 PROCEDURE — 85025 COMPLETE CBC W/AUTO DIFF WBC: CPT | Performed by: PHYSICIAN ASSISTANT

## 2024-04-16 PROCEDURE — 83690 ASSAY OF LIPASE: CPT | Performed by: PHYSICIAN ASSISTANT

## 2024-04-16 PROCEDURE — 87636 SARSCOV2 & INF A&B AMP PRB: CPT | Performed by: PHYSICIAN ASSISTANT

## 2024-04-16 PROCEDURE — 83605 ASSAY OF LACTIC ACID: CPT

## 2024-04-16 PROCEDURE — 96365 THER/PROPH/DIAG IV INF INIT: CPT

## 2024-04-16 PROCEDURE — 74176 CT ABD & PELVIS W/O CONTRAST: CPT

## 2024-04-16 PROCEDURE — 36415 COLL VENOUS BLD VENIPUNCTURE: CPT

## 2024-04-16 PROCEDURE — 99285 EMERGENCY DEPT VISIT HI MDM: CPT

## 2024-04-16 PROCEDURE — 80053 COMPREHEN METABOLIC PANEL: CPT | Performed by: PHYSICIAN ASSISTANT

## 2024-04-16 PROCEDURE — 87086 URINE CULTURE/COLONY COUNT: CPT | Performed by: PHYSICIAN ASSISTANT

## 2024-04-16 PROCEDURE — 87040 BLOOD CULTURE FOR BACTERIA: CPT | Performed by: PHYSICIAN ASSISTANT

## 2024-04-16 PROCEDURE — 25010000002 CEFTRIAXONE PER 250 MG: Performed by: PHYSICIAN ASSISTANT

## 2024-04-16 RX ORDER — SODIUM CHLORIDE 0.9 % (FLUSH) 0.9 %
10 SYRINGE (ML) INJECTION AS NEEDED
Status: DISCONTINUED | OUTPATIENT
Start: 2024-04-16 | End: 2024-04-18 | Stop reason: HOSPADM

## 2024-04-16 RX ORDER — LOSARTAN POTASSIUM AND HYDROCHLOROTHIAZIDE 25; 100 MG/1; MG/1
1 TABLET ORAL DAILY
COMMUNITY
End: 2024-04-18 | Stop reason: HOSPADM

## 2024-04-16 RX ADMIN — CEFTRIAXONE 2000 MG: 2 INJECTION, POWDER, FOR SOLUTION INTRAMUSCULAR; INTRAVENOUS at 22:11

## 2024-04-16 NOTE — Clinical Note
Level of Care: Med/Surg [1]   Admitting Physician: AIDAN BRADLEY [[id number]]   Attending Physician: AIDAN BRADLEY [[id number]]

## 2024-04-16 NOTE — ED PROVIDER NOTES
Subjective   Provider in Triage Note  Patient is a 69-year-old male presents to the ED with reports of subjective fever, chills, body aches started over the weekend with left flank pain that started Saturday and blood in his urine that started Sunday.  Does report history of kidney stones in the past.  [facility].  Describes the pain as a constant sharp type pain that he rates as severe.  Reports lightheadedness at times.      History of Present Illness  Pit note reviewed.    Patient is 69-year-old male history of CAD hypertension diabetes presents to the ER with complaints of chills body aches generally not feeling well and dark urine since Saturday.  He reports history of kidney stones and a large bladder stone that he had lithotripsy on previously.  He states he thinks he is trying to pass one of the stones.  No chest pain or shortness of breath.  No cough.  No vomiting or diarrhea.  He states he just does not feel well.  Patient states he is still able to urinate and his flow is normal.  No pain in his scrotum or penis.  No gross hematuria.    History provided by:  Patient      Review of Systems   Constitutional:  Positive for chills and fatigue. Negative for fever.   HENT:  Negative for sore throat and trouble swallowing.    Eyes: Negative.    Respiratory:  Negative for shortness of breath and wheezing.    Cardiovascular:  Negative for chest pain.   Gastrointestinal:  Negative for abdominal pain, diarrhea, nausea and vomiting.   Endocrine: Negative.    Genitourinary:  Positive for hematuria. Negative for dysuria.   Musculoskeletal:  Positive for myalgias.   Skin:  Negative for rash.   Allergic/Immunologic: Negative.    Neurological:  Negative for weakness and headaches.   Psychiatric/Behavioral:  Negative for behavioral problems.    All other systems reviewed and are negative.      Past Medical History:   Diagnosis Date    Coronary artery disease involving native coronary artery  of native heart 2016    PCI  PCI RCA 16 PCI to LAD 2018    Hypertension     Mixed hyperlipidemia 2019    Type 2 diabetes mellitus without complications 2019       Allergies   Allergen Reactions    Crestor [Rosuvastatin] Myalgia       Past Surgical History:   Procedure Laterality Date    CARDIAC CATHETERIZATION      CARDIAC CATHETERIZATION Right 2023    Procedure: LEFT HEART CATH with possible PCI;  Surgeon: Alcira Garcia MD;  Location: [facility] CATH INVASIVE LOCATION;  Service: Cardiovascular;  Laterality: Right;  Local and IV sedation    CORONARY STENT PLACEMENT         Family History   Problem Relation Age of Onset    Heart disease Mother     COPD Mother     Cancer Mother     No Known Problems Father        Social History     Socioeconomic History    Marital status:    Tobacco Use    Smoking status: Every Day     Current packs/day: 0.00     Types: Cigarettes     Last attempt to quit:      Years since quittin.3    Smokeless tobacco: Never    Tobacco comments:     Patient advised to stop smoking   Vaping Use    Vaping status: Never Used   Substance and Sexual Activity    Alcohol use: Not Currently    Drug use: Never    Sexual activity: Defer           Objective   Physical Exam  Vitals and nursing note reviewed.   Constitutional:       General: He is not in acute distress.     Appearance: Normal appearance. He is normal weight. He is not diaphoretic.   HENT:      Mouth/Throat:      Mouth: Mucous membranes are moist.   Eyes:      Extraocular Movements: Extraocular movements intact.      Pupils: Pupils are equal, round, and reactive to light.   Cardiovascular:      Rate and Rhythm: Normal rate and regular rhythm.      Pulses: Normal pulses.      Heart sounds: Normal heart sounds. No murmur heard.  Pulmonary:      Effort: Pulmonary effort is normal.      Breath sounds: Normal breath sounds.   Abdominal:      General: Abdomen is flat.      Tenderness:  "There is no abdominal tenderness.   Skin:     Capillary Refill: Capillary refill takes less than 2 seconds.   Neurological:      General: No focal deficit present.      Mental Status: He is alert and oriented to person, place, and time.   Psychiatric:         Mood and Affect: Mood normal.         Behavior: Behavior normal.         Procedures           ED Course  ED Course as of 04/16/24 2342 Tue Apr 16, 2024 2139 Due to significant overcrowding in the emergency department patient was evaluated by myself in a hallway bed.  This exam may be limited by privacy, noise and the patient not wearing a hospital gown.  Explained to the patient our limitations and are overcrowding.  They were in agreement to continue the exam and treatment at this time. []   2205 I spoke with Dr. Amos who states that patient is to kept n.p.o., no IV fluids and admit to the hospitalist for procedure tomorrow. []   2208 Spoke with ERIN Espinoza who agreed accept patient for admission []      ED Course User Index  [] Denise Larsen PA    /58 (BP Location: Left arm, Patient Position: Lying)   Pulse 78   Temp 98 °F (36.7 °C) (Oral)   Resp 20   Ht 172.7 cm (68\")   Wt 108 kg (238 lb 8.6 oz)   SpO2 100%   BMI 36.27 kg/m²   Labs Reviewed   COMPREHENSIVE METABOLIC PANEL - Abnormal; Notable for the following components:       Result Value    Glucose 163 (*)     BUN 36 (*)     Creatinine 1.45 (*)     Potassium 3.0 (*)     Chloride 93 (*)     CO2 31.0 (*)     eGFR 51.8 (*)     All other components within normal limits    Narrative:     GFR Normal >60  Chronic Kidney Disease <60  Kidney Failure <15     URINALYSIS W/ CULTURE IF INDICATED - Abnormal; Notable for the following components:    Appearance, UA Cloudy (*)     Blood, UA Trace (*)     Leuk Esterase, UA Moderate (2+) (*)     All other components within normal limits    Narrative:     In absence of clinical symptoms, the presence of pyuria, bacteria, and/or nitrites on the " urinalysis result does not correlate with infection.   CBC WITH AUTO DIFFERENTIAL - Abnormal; Notable for the following components:    Hemoglobin 12.8 (*)     Hematocrit 37.4 (*)     Lymphocyte % 14.4 (*)     Eosinophils, Absolute 0.43 (*)     All other components within normal limits   URINALYSIS, MICROSCOPIC ONLY - Abnormal; Notable for the following components:    WBC, UA Too Numerous to Count (*)     All other components within normal limits   COVID-19 AND FLU A/B, NP SWAB IN TRANSPORT MEDIA 1 HR TAT - Normal    Narrative:     Fact sheet for providers: https://www.fda.gov/media/068614/download    Fact sheet for patients: https://www.fda.gov/media/705023/download    Test performed by PCR.   LIPASE - Normal   MAGNESIUM - Normal   POC LACTATE - Normal   BLOOD CULTURE   BLOOD CULTURE   URINE CULTURE   POC LACTATE   CBC AND DIFFERENTIAL    Narrative:     The following orders were created for panel order CBC & Differential.  Procedure                               Abnormality         Status                     ---------                               -----------         ------                     CBC Auto Differential[[id number]]        Abnormal            Final result                 Please view results for these tests on the individual orders.     Medications   sodium chloride 0.9 % flush 10 mL (has no administration in time range)   cefTRIAXone (ROCEPHIN) 2,000 mg in sodium chloride 0.9 % 100 mL MBP (2,000 mg Intravenous New Bag 4/16/24 1283)     CT Abdomen Pelvis Without Contrast    Result Date: 4/16/2024  Impression: A large calculus is noted within the urinary bladder, although this has decreased in size since prior study. Additional 2 focal calcifications are noted within the region of the prostate gland measuring 1.4 and 1.2 cm presumably within the urethra. These  most likely represent fractured stone that are now present within the urethra. Mild circumferential bladder wall thickening, nonspecific but may  represent cystitis. Punctate nonobstructing stone in the left lower pole. No hydronephrosis. Electronically Signed: Zane DO Florecita  4/16/2024 8:56 PM EDT  Workstation ID: [id number]                                            Medical Decision Making  Differential Dx (Includes but not limited to): Kidney stone, bladder stone, UTI, pyelonephritis, COVID, flu  Medical Records Reviewed: Patient seen by his cardiologist 1/22/2024 for follow-up.  According to this note patient was seen in December 2023 for complaints of chest pain and found to have high-grade lesion in the distal RCA status post successful PCI.  Labs: On my interpretation, lactate normal.  Urinalysis TNTC WBCs and 2+ leukocytes.  COVID and flu negative.  CBC no leukocytosis.  CMP glucose 173 BUN 36 creatinine 1.45.  Imaging: Imaging was reviewed by myself, independently interpreted by radiologist showing a large calculus noted in the urinary bladder although decreased in size additional 2 calcifications noted in the region of the prostate urine presumably within the urethra  Telemetry: N/A  Testing considered but not ordered: CT head patient denies headache or head injury.  Chest x-ray patient denies shortness of breath  Nature of Complaint: Acute  Admission vs Discharge: Admission  Discussion: While in the ED IV was placed and labs were obtained appropriate PPE was worn during exam and throughout all encounters with the patient.  Patient had the above evaluation.  He is afebrile nontoxic appearance in no acute distress.  He denies pain.  Vital signs stable.  Lab work as noted above significant for UTI he was given IV Rocephin.  CT imaging shows bladder stone as well as 2 suspected urethra stones.  Patient states he is able to urinate without difficulty.  Spoke with on-call urology, Dr. Amos who recommended patient kept n.p.o., no IV fluids in preparation for procedure tomorrow to remove the stones.  Patient does have a mild ANTHONY, initially fluids  were ordered for this however the order was canceled at the recommendation of urology.  Patient will be admitted to the hospitalist I spoke with ERIN Espinoza who agreed accept patient for hospitalist admission.    Based on the clinical findings at this time I anticipate that the patient will require 2 midnight stay.    Problems Addressed:  ANTHONY (acute kidney injury): acute illness or injury  Chills: acute illness or injury  Hypokalemia: acute illness or injury  Urethral stone: acute illness or injury  Urinary tract infection without hematuria, site unspecified: acute illness or injury    Amount and/or Complexity of Data Reviewed  External Data Reviewed: notes.  Labs: ordered. Decision-making details documented in ED Course.  Radiology: ordered. Decision-making details documented in ED Course.    Risk  Prescription drug management.  Decision regarding hospitalization.        Final diagnoses:   Urethral stone   Urinary tract infection without hematuria, site unspecified   Chills   ANTHONY (acute kidney injury)   Hypokalemia       ED Disposition  ED Disposition       ED Disposition   Decision to Admit    Condition   --    Comment   Level of Care: Med/Surg [1]   Diagnosis: Hematuria [689763]   Admitting Physician: AIDAN BRADLEY [[id number]]   Attending Physician: AIDAN BRADLEY [[id number]]                 No follow-up provider specified.       Medication List      No changes were made to your prescriptions during this visit.            Denise Larsen PA  04/16/24 8082

## 2024-04-17 ENCOUNTER — ANESTHESIA EVENT (OUTPATIENT)
Dept: PERIOP | Facility: HOSPITAL | Age: 69
End: 2024-04-17
Payer: MEDICARE

## 2024-04-17 ENCOUNTER — ANESTHESIA (OUTPATIENT)
Dept: PERIOP | Facility: HOSPITAL | Age: 69
End: 2024-04-17
Payer: MEDICARE

## 2024-04-17 LAB
ANION GAP SERPL CALCULATED.3IONS-SCNC: 9 MMOL/L (ref 5–15)
BASOPHILS # BLD AUTO: 0.01 10*3/MM3 (ref 0–0.2)
BASOPHILS NFR BLD AUTO: 0.1 % (ref 0–1.5)
BUN SERPL-MCNC: 42 MG/DL (ref 8–23)
BUN/CREAT SERPL: 25.3 (ref 7–25)
CALCIUM SPEC-SCNC: 9.1 MG/DL (ref 8.6–10.5)
CHLORIDE SERPL-SCNC: 97 MMOL/L (ref 98–107)
CO2 SERPL-SCNC: 30 MMOL/L (ref 22–29)
CREAT SERPL-MCNC: 1.66 MG/DL (ref 0.76–1.27)
DEPRECATED RDW RBC AUTO: 40.4 FL (ref 37–54)
EGFRCR SERPLBLD CKD-EPI 2021: 44.1 ML/MIN/1.73
EOSINOPHIL # BLD AUTO: 0.33 10*3/MM3 (ref 0–0.4)
EOSINOPHIL NFR BLD AUTO: 4.7 % (ref 0.3–6.2)
ERYTHROCYTE [DISTWIDTH] IN BLOOD BY AUTOMATED COUNT: 13.2 % (ref 12.3–15.4)
GLUCOSE BLDC GLUCOMTR-MCNC: 147 MG/DL (ref 70–105)
GLUCOSE BLDC GLUCOMTR-MCNC: 155 MG/DL (ref 70–105)
GLUCOSE BLDC GLUCOMTR-MCNC: 166 MG/DL (ref 70–105)
GLUCOSE BLDC GLUCOMTR-MCNC: 193 MG/DL (ref 70–105)
GLUCOSE BLDC GLUCOMTR-MCNC: 300 MG/DL (ref 70–105)
GLUCOSE BLDC GLUCOMTR-MCNC: 309 MG/DL (ref 70–105)
GLUCOSE SERPL-MCNC: 233 MG/DL (ref 65–99)
HCT VFR BLD AUTO: 35.7 % (ref 37.5–51)
HGB BLD-MCNC: 12.3 G/DL (ref 13–17.7)
IMM GRANULOCYTES # BLD AUTO: 0.02 10*3/MM3 (ref 0–0.05)
IMM GRANULOCYTES NFR BLD AUTO: 0.3 % (ref 0–0.5)
LYMPHOCYTES # BLD AUTO: 1.03 10*3/MM3 (ref 0.7–3.1)
LYMPHOCYTES NFR BLD AUTO: 14.8 % (ref 19.6–45.3)
MCH RBC QN AUTO: 28.9 PG (ref 26.6–33)
MCHC RBC AUTO-ENTMCNC: 34.5 G/DL (ref 31.5–35.7)
MCV RBC AUTO: 84 FL (ref 79–97)
MONOCYTES # BLD AUTO: 0.69 10*3/MM3 (ref 0.1–0.9)
MONOCYTES NFR BLD AUTO: 9.9 % (ref 5–12)
NEUTROPHILS NFR BLD AUTO: 4.9 10*3/MM3 (ref 1.7–7)
NEUTROPHILS NFR BLD AUTO: 70.2 % (ref 42.7–76)
NRBC BLD AUTO-RTO: 0 /100 WBC (ref 0–0.2)
PLATELET # BLD AUTO: 204 10*3/MM3 (ref 140–450)
PMV BLD AUTO: 9.9 FL (ref 6–12)
POTASSIUM SERPL-SCNC: 3.7 MMOL/L (ref 3.5–5.2)
QT INTERVAL: 378 MS
QTC INTERVAL: 396 MS
RBC # BLD AUTO: 4.25 10*6/MM3 (ref 4.14–5.8)
SODIUM SERPL-SCNC: 136 MMOL/L (ref 136–145)
WBC NRBC COR # BLD AUTO: 6.98 10*3/MM3 (ref 3.4–10.8)

## 2024-04-17 PROCEDURE — A9270 NON-COVERED ITEM OR SERVICE: HCPCS | Performed by: NURSE PRACTITIONER

## 2024-04-17 PROCEDURE — G0378 HOSPITAL OBSERVATION PER HR: HCPCS

## 2024-04-17 PROCEDURE — 63710000001 INSULIN LISPRO (HUMAN) PER 5 UNITS: Performed by: UROLOGY

## 2024-04-17 PROCEDURE — 63710000001 POTASSIUM CHLORIDE 20 MEQ TABLET CONTROLLED-RELEASE: Performed by: NURSE PRACTITIONER

## 2024-04-17 PROCEDURE — 25810000003 SODIUM CHLORIDE 0.9 % SOLUTION: Performed by: UROLOGY

## 2024-04-17 PROCEDURE — 82948 REAGENT STRIP/BLOOD GLUCOSE: CPT

## 2024-04-17 PROCEDURE — 25810000003 LACTATED RINGERS PER 1000 ML: Performed by: UROLOGY

## 2024-04-17 PROCEDURE — 82948 REAGENT STRIP/BLOOD GLUCOSE: CPT | Performed by: NURSE PRACTITIONER

## 2024-04-17 PROCEDURE — 25010000002 PHENYLEPHRINE 10 MG/ML SOLUTION: Performed by: NURSE ANESTHETIST, CERTIFIED REGISTERED

## 2024-04-17 PROCEDURE — 82948 REAGENT STRIP/BLOOD GLUCOSE: CPT | Performed by: NURSE ANESTHETIST, CERTIFIED REGISTERED

## 2024-04-17 PROCEDURE — 93005 ELECTROCARDIOGRAM TRACING: CPT | Performed by: FAMILY MEDICINE

## 2024-04-17 PROCEDURE — 85025 COMPLETE CBC W/AUTO DIFF WBC: CPT | Performed by: NURSE PRACTITIONER

## 2024-04-17 PROCEDURE — 25010000002 FENTANYL CITRATE (PF) 100 MCG/2ML SOLUTION: Performed by: NURSE ANESTHETIST, CERTIFIED REGISTERED

## 2024-04-17 PROCEDURE — 25010000002 ONDANSETRON PER 1 MG: Performed by: NURSE ANESTHETIST, CERTIFIED REGISTERED

## 2024-04-17 PROCEDURE — 63710000001 INSULIN LISPRO (HUMAN) PER 5 UNITS: Performed by: NURSE PRACTITIONER

## 2024-04-17 PROCEDURE — 25010000002 PROPOFOL 200 MG/20ML EMULSION: Performed by: NURSE ANESTHETIST, CERTIFIED REGISTERED

## 2024-04-17 PROCEDURE — A9270 NON-COVERED ITEM OR SERVICE: HCPCS | Performed by: UROLOGY

## 2024-04-17 PROCEDURE — 25010000002 DEXAMETHASONE SODIUM PHOSPHATE 20 MG/5ML SOLUTION: Performed by: NURSE ANESTHETIST, CERTIFIED REGISTERED

## 2024-04-17 PROCEDURE — 84484 ASSAY OF TROPONIN QUANT: CPT | Performed by: NURSE PRACTITIONER

## 2024-04-17 PROCEDURE — 82365 CALCULUS SPECTROSCOPY: CPT | Performed by: UROLOGY

## 2024-04-17 PROCEDURE — 80048 BASIC METABOLIC PNL TOTAL CA: CPT | Performed by: NURSE PRACTITIONER

## 2024-04-17 PROCEDURE — 25010000002 CEFTRIAXONE PER 250 MG: Performed by: UROLOGY

## 2024-04-17 RX ORDER — INSULIN LISPRO 100 [IU]/ML
2-7 INJECTION, SOLUTION INTRAVENOUS; SUBCUTANEOUS
Status: DISCONTINUED | OUTPATIENT
Start: 2024-04-17 | End: 2024-04-18 | Stop reason: HOSPADM

## 2024-04-17 RX ORDER — SODIUM CHLORIDE 9 MG/ML
50 INJECTION, SOLUTION INTRAVENOUS CONTINUOUS
Status: DISCONTINUED | OUTPATIENT
Start: 2024-04-17 | End: 2024-04-18 | Stop reason: HOSPADM

## 2024-04-17 RX ORDER — SODIUM CHLORIDE 9 MG/ML
40 INJECTION, SOLUTION INTRAVENOUS AS NEEDED
Status: DISCONTINUED | OUTPATIENT
Start: 2024-04-17 | End: 2024-04-18 | Stop reason: HOSPADM

## 2024-04-17 RX ORDER — FLUMAZENIL 0.1 MG/ML
0.2 INJECTION INTRAVENOUS AS NEEDED
Status: DISCONTINUED | OUTPATIENT
Start: 2024-04-17 | End: 2024-04-17 | Stop reason: HOSPADM

## 2024-04-17 RX ORDER — SODIUM CHLORIDE 0.9 % (FLUSH) 0.9 %
10 SYRINGE (ML) INJECTION EVERY 12 HOURS SCHEDULED
Status: DISCONTINUED | OUTPATIENT
Start: 2024-04-17 | End: 2024-04-18 | Stop reason: HOSPADM

## 2024-04-17 RX ORDER — IPRATROPIUM BROMIDE AND ALBUTEROL SULFATE 2.5; .5 MG/3ML; MG/3ML
3 SOLUTION RESPIRATORY (INHALATION) ONCE AS NEEDED
Status: DISCONTINUED | OUTPATIENT
Start: 2024-04-17 | End: 2024-04-17 | Stop reason: HOSPADM

## 2024-04-17 RX ORDER — BISACODYL 10 MG
10 SUPPOSITORY, RECTAL RECTAL DAILY PRN
Status: DISCONTINUED | OUTPATIENT
Start: 2024-04-17 | End: 2024-04-18 | Stop reason: HOSPADM

## 2024-04-17 RX ORDER — ONDANSETRON 2 MG/ML
4 INJECTION INTRAMUSCULAR; INTRAVENOUS ONCE AS NEEDED
Status: DISCONTINUED | OUTPATIENT
Start: 2024-04-17 | End: 2024-04-17 | Stop reason: HOSPADM

## 2024-04-17 RX ORDER — EPHEDRINE SULFATE 5 MG/ML
5 INJECTION INTRAVENOUS ONCE AS NEEDED
Status: DISCONTINUED | OUTPATIENT
Start: 2024-04-17 | End: 2024-04-17 | Stop reason: HOSPADM

## 2024-04-17 RX ORDER — POTASSIUM CHLORIDE 20 MEQ/1
40 TABLET, EXTENDED RELEASE ORAL ONCE
Status: COMPLETED | OUTPATIENT
Start: 2024-04-17 | End: 2024-04-17

## 2024-04-17 RX ORDER — ONDANSETRON 2 MG/ML
INJECTION INTRAMUSCULAR; INTRAVENOUS AS NEEDED
Status: DISCONTINUED | OUTPATIENT
Start: 2024-04-17 | End: 2024-04-17 | Stop reason: SURG

## 2024-04-17 RX ORDER — BISACODYL 5 MG/1
5 TABLET, DELAYED RELEASE ORAL DAILY PRN
Status: DISCONTINUED | OUTPATIENT
Start: 2024-04-17 | End: 2024-04-18 | Stop reason: HOSPADM

## 2024-04-17 RX ORDER — ONDANSETRON 2 MG/ML
4 INJECTION INTRAMUSCULAR; INTRAVENOUS EVERY 6 HOURS PRN
Status: DISCONTINUED | OUTPATIENT
Start: 2024-04-17 | End: 2024-04-18 | Stop reason: HOSPADM

## 2024-04-17 RX ORDER — NALOXONE HCL 0.4 MG/ML
0.4 VIAL (ML) INJECTION AS NEEDED
Status: DISCONTINUED | OUTPATIENT
Start: 2024-04-17 | End: 2024-04-17 | Stop reason: HOSPADM

## 2024-04-17 RX ORDER — DEXAMETHASONE SODIUM PHOSPHATE 4 MG/ML
INJECTION, SOLUTION INTRA-ARTICULAR; INTRALESIONAL; INTRAMUSCULAR; INTRAVENOUS; SOFT TISSUE AS NEEDED
Status: DISCONTINUED | OUTPATIENT
Start: 2024-04-17 | End: 2024-04-17 | Stop reason: SURG

## 2024-04-17 RX ORDER — EPHEDRINE SULFATE 5 MG/ML
INJECTION INTRAVENOUS AS NEEDED
Status: DISCONTINUED | OUTPATIENT
Start: 2024-04-17 | End: 2024-04-17 | Stop reason: SURG

## 2024-04-17 RX ORDER — ACETAMINOPHEN 325 MG/1
650 TABLET ORAL ONCE AS NEEDED
Status: DISCONTINUED | OUTPATIENT
Start: 2024-04-17 | End: 2024-04-17 | Stop reason: HOSPADM

## 2024-04-17 RX ORDER — OXYCODONE HYDROCHLORIDE 5 MG/1
5 TABLET ORAL ONCE AS NEEDED
Status: DISCONTINUED | OUTPATIENT
Start: 2024-04-17 | End: 2024-04-17 | Stop reason: HOSPADM

## 2024-04-17 RX ORDER — FENTANYL CITRATE 50 UG/ML
50 INJECTION, SOLUTION INTRAMUSCULAR; INTRAVENOUS
Status: DISCONTINUED | OUTPATIENT
Start: 2024-04-17 | End: 2024-04-17 | Stop reason: HOSPADM

## 2024-04-17 RX ORDER — FENTANYL CITRATE 50 UG/ML
INJECTION, SOLUTION INTRAMUSCULAR; INTRAVENOUS AS NEEDED
Status: DISCONTINUED | OUTPATIENT
Start: 2024-04-17 | End: 2024-04-17 | Stop reason: SURG

## 2024-04-17 RX ORDER — POLYETHYLENE GLYCOL 3350 17 G/17G
17 POWDER, FOR SOLUTION ORAL DAILY PRN
Status: DISCONTINUED | OUTPATIENT
Start: 2024-04-17 | End: 2024-04-18 | Stop reason: HOSPADM

## 2024-04-17 RX ORDER — AMOXICILLIN 250 MG
2 CAPSULE ORAL 2 TIMES DAILY PRN
Status: DISCONTINUED | OUTPATIENT
Start: 2024-04-17 | End: 2024-04-18 | Stop reason: HOSPADM

## 2024-04-17 RX ORDER — LIDOCAINE HYDROCHLORIDE 20 MG/ML
INJECTION, SOLUTION EPIDURAL; INFILTRATION; INTRACAUDAL; PERINEURAL AS NEEDED
Status: DISCONTINUED | OUTPATIENT
Start: 2024-04-17 | End: 2024-04-17 | Stop reason: SURG

## 2024-04-17 RX ORDER — INSULIN LISPRO 100 [IU]/ML
2-7 INJECTION, SOLUTION INTRAVENOUS; SUBCUTANEOUS EVERY 6 HOURS SCHEDULED
Status: DISCONTINUED | OUTPATIENT
Start: 2024-04-17 | End: 2024-04-17

## 2024-04-17 RX ORDER — HYDRALAZINE HYDROCHLORIDE 20 MG/ML
5 INJECTION INTRAMUSCULAR; INTRAVENOUS
Status: DISCONTINUED | OUTPATIENT
Start: 2024-04-17 | End: 2024-04-17 | Stop reason: HOSPADM

## 2024-04-17 RX ORDER — SODIUM CHLORIDE 0.9 % (FLUSH) 0.9 %
10 SYRINGE (ML) INJECTION AS NEEDED
Status: DISCONTINUED | OUTPATIENT
Start: 2024-04-17 | End: 2024-04-18 | Stop reason: HOSPADM

## 2024-04-17 RX ORDER — SODIUM CHLORIDE, SODIUM LACTATE, POTASSIUM CHLORIDE, CALCIUM CHLORIDE 600; 310; 30; 20 MG/100ML; MG/100ML; MG/100ML; MG/100ML
1000 INJECTION, SOLUTION INTRAVENOUS CONTINUOUS
Status: DISCONTINUED | OUTPATIENT
Start: 2024-04-17 | End: 2024-04-18 | Stop reason: HOSPADM

## 2024-04-17 RX ORDER — LABETALOL HYDROCHLORIDE 5 MG/ML
5 INJECTION, SOLUTION INTRAVENOUS
Status: DISCONTINUED | OUTPATIENT
Start: 2024-04-17 | End: 2024-04-17 | Stop reason: HOSPADM

## 2024-04-17 RX ORDER — IBUPROFEN 600 MG/1
1 TABLET ORAL
Status: DISCONTINUED | OUTPATIENT
Start: 2024-04-17 | End: 2024-04-18 | Stop reason: HOSPADM

## 2024-04-17 RX ORDER — PROPOFOL 10 MG/ML
INJECTION, EMULSION INTRAVENOUS AS NEEDED
Status: DISCONTINUED | OUTPATIENT
Start: 2024-04-17 | End: 2024-04-17 | Stop reason: SURG

## 2024-04-17 RX ORDER — DEXTROSE MONOHYDRATE 25 G/50ML
25 INJECTION, SOLUTION INTRAVENOUS
Status: DISCONTINUED | OUTPATIENT
Start: 2024-04-17 | End: 2024-04-18 | Stop reason: HOSPADM

## 2024-04-17 RX ORDER — PHENYLEPHRINE HYDROCHLORIDE 10 MG/ML
INJECTION INTRAVENOUS AS NEEDED
Status: DISCONTINUED | OUTPATIENT
Start: 2024-04-17 | End: 2024-04-17 | Stop reason: SURG

## 2024-04-17 RX ORDER — NICOTINE POLACRILEX 4 MG
15 LOZENGE BUCCAL
Status: DISCONTINUED | OUTPATIENT
Start: 2024-04-17 | End: 2024-04-18 | Stop reason: HOSPADM

## 2024-04-17 RX ORDER — ATORVASTATIN CALCIUM 10 MG/1
10 TABLET, FILM COATED ORAL DAILY
Status: DISCONTINUED | OUTPATIENT
Start: 2024-04-17 | End: 2024-04-18 | Stop reason: HOSPADM

## 2024-04-17 RX ADMIN — CEFTRIAXONE 2000 MG: 2 INJECTION, POWDER, FOR SOLUTION INTRAMUSCULAR; INTRAVENOUS at 20:34

## 2024-04-17 RX ADMIN — FENTANYL CITRATE 50 MCG: 50 INJECTION, SOLUTION INTRAMUSCULAR; INTRAVENOUS at 17:26

## 2024-04-17 RX ADMIN — INSULIN LISPRO 2 UNITS: 100 INJECTION, SOLUTION INTRAVENOUS; SUBCUTANEOUS at 11:31

## 2024-04-17 RX ADMIN — SODIUM CHLORIDE 50 ML/HR: 9 INJECTION, SOLUTION INTRAVENOUS at 20:34

## 2024-04-17 RX ADMIN — SODIUM CHLORIDE, POTASSIUM CHLORIDE, SODIUM LACTATE AND CALCIUM CHLORIDE 1000 ML: 600; 310; 30; 20 INJECTION, SOLUTION INTRAVENOUS at 15:41

## 2024-04-17 RX ADMIN — DEXAMETHASONE SODIUM PHOSPHATE 4 MG: 4 INJECTION, SOLUTION INTRAMUSCULAR; INTRAVENOUS at 17:23

## 2024-04-17 RX ADMIN — LIDOCAINE HYDROCHLORIDE 100 MG: 20 INJECTION, SOLUTION EPIDURAL; INFILTRATION; INTRACAUDAL; PERINEURAL at 17:18

## 2024-04-17 RX ADMIN — PROPOFOL 100 MG: 10 INJECTION, EMULSION INTRAVENOUS at 17:18

## 2024-04-17 RX ADMIN — INSULIN LISPRO 5 UNITS: 100 INJECTION, SOLUTION INTRAVENOUS; SUBCUTANEOUS at 21:32

## 2024-04-17 RX ADMIN — ONDANSETRON 4 MG: 2 INJECTION INTRAMUSCULAR; INTRAVENOUS at 17:23

## 2024-04-17 RX ADMIN — Medication 10 ML: at 00:40

## 2024-04-17 RX ADMIN — PHENYLEPHRINE HYDROCHLORIDE 100 MCG: 10 INJECTION INTRAVENOUS at 17:28

## 2024-04-17 RX ADMIN — EPHEDRINE SULFATE 10 MG: 5 INJECTION INTRAVENOUS at 17:53

## 2024-04-17 RX ADMIN — PHENYLEPHRINE HYDROCHLORIDE 200 MCG: 10 INJECTION INTRAVENOUS at 17:40

## 2024-04-17 RX ADMIN — Medication 10 ML: at 11:32

## 2024-04-17 RX ADMIN — INSULIN LISPRO 5 UNITS: 100 INJECTION, SOLUTION INTRAVENOUS; SUBCUTANEOUS at 00:40

## 2024-04-17 RX ADMIN — PHENYLEPHRINE HYDROCHLORIDE 100 MCG: 10 INJECTION INTRAVENOUS at 17:48

## 2024-04-17 RX ADMIN — PHENYLEPHRINE HYDROCHLORIDE 200 MCG: 10 INJECTION INTRAVENOUS at 17:36

## 2024-04-17 RX ADMIN — SODIUM CHLORIDE 50 ML/HR: 9 INJECTION, SOLUTION INTRAVENOUS at 07:46

## 2024-04-17 RX ADMIN — POTASSIUM CHLORIDE 40 MEQ: 1500 TABLET, EXTENDED RELEASE ORAL at 00:37

## 2024-04-17 RX ADMIN — FENTANYL CITRATE 50 MCG: 50 INJECTION, SOLUTION INTRAMUSCULAR; INTRAVENOUS at 18:04

## 2024-04-17 RX ADMIN — Medication 10 ML: at 20:35

## 2024-04-17 NOTE — ANESTHESIA POSTPROCEDURE EVALUATION
Patient: Codey Rhodes    Procedure Summary       Date: 04/17/24 Room / Location: [facility] OR  / [facility] MAIN OR    Anesthesia Start: 1712 Anesthesia Stop: 1821    Procedure: CYSTOSCOPY HOLMIUM LASER, LITHOPAXY, BLADDER STONE Diagnosis:     Surgeons: Shilo Shah MD Provider: Lamonte Kelly MD    Anesthesia Type: general ASA Status: 3            Anesthesia Type: general    Vitals  Vitals Value Taken Time   /73 04/17/24 1848   Temp 96.8 °F (36 °C) 04/17/24 1816   Pulse 74 04/17/24 1849   Resp 17 04/17/24 1833   SpO2 91 % 04/17/24 1849   Vitals shown include unfiled device data.        Post Anesthesia Care and Evaluation    Patient location during evaluation: PACU  Patient participation: complete - patient participated  Level of consciousness: awake  Pain score: 0  Pain management: adequate  Anesthetic complications: No anesthetic complications  PONV Status: none  Cardiovascular status: acceptable  Respiratory status: acceptable  Hydration status: acceptable

## 2024-04-17 NOTE — CASE MANAGEMENT/SOCIAL WORK
Discharge Planning Assessment  [facility]     Patient Name: Codey Rhodes  MRN: [id number]  Today's Date: 4/17/2024    Admit Date: 4/16/2024    Plan: DC PLAN: Routine home with wife.       Discharge Needs Assessment       Row Name 04/17/24 1348       Living Environment    People in Home spouse    Current Living Arrangements home    Potentially Unsafe Housing Conditions none    In the past 12 months has the electric, gas, oil, or water company threatened to shut off services in your home? No    Primary Care Provided by self    Provides Primary Care For no one    Family Caregiver if Needed spouse    Quality of Family Relationships helpful;involved;supportive    Able to Return to Prior Arrangements yes       Resource/Environmental Concerns    Resource/Environmental Concerns none    Transportation Concerns none       Transportation Needs    In the past 12 months, has lack of transportation kept you from medical appointments or from getting medications? no    In the past 12 months, has lack of transportation kept you from meetings, work, or from getting things needed for daily living? No       Food Insecurity    Within the past 12 months, you worried that your food would run out before you got the money to buy more. Never true    Within the past 12 months, the food you bought just didn't last and you didn't have money to get more. Never true       Transition Planning    Patient/Family Anticipates Transition to home with family    Patient/Family Anticipated Services at Transition none    Transportation Anticipated car, drives self;family or friend will provide       Discharge Needs Assessment    Readmission Within the Last 30 Days no previous admission in last 30 days    Equipment Currently Used at Home none    Anticipated Changes Related to Illness none    Equipment Needed After Discharge none                   Discharge Plan       Row Name 04/17/24 1348       Plan    Plan DC PLAN: Routine home with wife.     Patient/Family in Agreement with Plan yes    Plan Comments Met with patient at bedside, from routine home with wife. Independent with ADL's no DME. PCP is [facility] and pharmacy is [facility]. Able to afford medications and denies any issues with food or utilities. Denies any transportation issues, still drives and wife will provide at time of discharge. Denies any HHC or SNF needs, Denies any concerns about return home.                      Continued Care and Services - Admitted Since 4/16/2024    No active coordination exists for this encounter.          Demographic Summary       Row Name 04/17/24 1348       General Information    Admission Type observation    Arrived From emergency department    Required Notices Provided Observation Status Notice    Referral Source admission list    Reason for Consult discharge planning    Preferred Language English       Contact Information    Permission Granted to Share Info With     Contact Information Obtained for                    Functional Status       Row Name 04/17/24 1348       Functional Status    Usual Activity Tolerance excellent    Current Activity Tolerance excellent       Physical Activity    On average, how many days per week do you engage in moderate to strenuous exercise (like a brisk walk)? 0 days    On average, how many minutes do you engage in exercise at this level? 0 min    Number of minutes of exercise per week 0       Assessment of Health Literacy    How often do you have someone help you read hospital materials? Sometimes    How often do you have problems learning about your medical condition because of difficulty understanding written information? Sometimes    How often do you have a problem understanding what is told to you about your medical condition? Sometimes    How confident are you filling out medical forms by yourself? Somewhat    Health Literacy Moderate       Functional Status, IADL    Medications independent    Meal  Preparation independent    Housekeeping independent    Laundry independent    Shopping independent       Mental Status    General Appearance WDL WDL       Mental Status Summary    Recent Changes in Mental Status/Cognitive Functioning no changes                       Patient Forms       Row Name 04/17/24 1346       Patient Forms    Important Message from Medicare ([facility]) Delivered  [facility] 4/16/24 per registration    Delivered to Patient    Method of delivery In person                  Savana Vasquez, MARY  Case Management

## 2024-04-17 NOTE — OP NOTE
Urology Operative Note    4/17/2024    Codey Rhodes  69 y.o.  1955  male  [id number]      Surgeon(s) and Role:  Shilo Shah MD - Primary     Pre-operative Diagnosis: Large bladder stones greater than 2.5 cm    Post-operative Diagnosis: Same    Complications: None    Procedures:    Cystolitholapaxy    Indications   Codey Rhodes is a 69 y.o. male.  Bladder stones has been treated last year with Dr. Rowley.  Now with 2 urethral stones and bladder stones measuring greater than 2.5 cm admitted and added on for intervention    During the informed consent process, the procedure was discussed in detail including the risks of bleeding, infection, and damage to surrounding structures.      Description of procedure:  The patient was properly identified in the preoperative holding area and taken to the operating room where general anesthesia was induced. The patient was prepped and draped in a sterile fashion. The patient was given antibiotics intravenously before the start of the surgery. After ensuring that all of the required equipment was ready and available a surgical timeout was performed.     Rigid cystoscopy was performed stone was seen in the prostatic fossa it was pushed back into the bladder.  The prostate had a high neck but overall only 3 cm in length.  26 Latvian resectoscope was then placed for continuous irrigation and thousand laser fiber used to dust the stones into tiny passable fragments.  The bladder had 2+ trabeculations.  All pieces were irrigated out stone.  Laser lithotripsy took exceptionally long time given the size of the stones and there was significant debris therefore decision was made to leave a catheter in place so a 22 Latvian three-way catheter was placed and urine was clear on CBI    There were no apparent complications. The patient woke up in the operating room and was taken to the recovery room in stable condition.     I was present and scrubbed for the entire procedure.      Specimens: Stone    Estimated Blood Loss: Minimal      Plan   -Void trial tomorrow morning         Shilo Shah MD  [facility]  [address], [address]  [place] [address] [phone]

## 2024-04-17 NOTE — CONSULTS
Urology Consult Note    Patient:Codey Rhodes :1955  Room:  Admit Date2024  Age:69 y.o.     SEX:male     DOS:2024     MR:[id number]     Visit:[id number]       Attending: Chiqui Matthews MD  Referring Provider: Dr. Grover  Reason for Consultation: Calculi and urethral calculi    Patient Care Team:  Ta Islas MD as PCP - General    Chief complaint bladder calculi and ureteral calculi    Subjective .     History of present illness: Patient is a 69-year-old gentleman who status post cystoscopy litholapaxy with Dr. Rowley recently.  Patient still has a lot of bladder calculi and ureteral calculi  on his CT scan.    Review of Systems  12 point review of systems were reviewed and are negative except for what is in HPI.    History  Past Medical History:   Diagnosis Date    Coronary artery disease involving native coronary artery of native heart 2016    PCI  PCI RCA 16 PCI to LAD 2018    Hypertension     Mixed hyperlipidemia 2019    Type 2 diabetes mellitus without complications 2019     Past Surgical History:   Procedure Laterality Date    CARDIAC CATHETERIZATION      CARDIAC CATHETERIZATION Right 2023    Procedure: LEFT HEART CATH with possible PCI;  Surgeon: Alcira Garcia MD;  Location: [facility] CATH INVASIVE LOCATION;  Service: Cardiovascular;  Laterality: Right;  Local and IV sedation    CORONARY STENT PLACEMENT       Social History     Socioeconomic History    Marital status:    Tobacco Use    Smoking status: Every Day     Current packs/day: 0.00     Types: Cigarettes     Last attempt to quit: 2005     Years since quittin.3    Smokeless tobacco: Never    Tobacco comments:     Patient advised to stop smoking   Vaping Use    Vaping status: Never Used   Substance and Sexual Activity    Alcohol use: Not Currently    Drug use: Never    Sexual activity: Defer     Family History   Problem Relation Age of Onset    Heart disease  Mother     COPD Mother     Cancer Mother     No Known Problems Father      Allergies   Allergen Reactions    Crestor [Rosuvastatin] Myalgia     Prior to Admission medications    Medication Sig Start Date End Date Taking? Authorizing Provider   amLODIPine (NORVASC) 10 MG tablet Take 1 tablet by mouth Daily. 3/22/23  Yes Kong Brennan MD   aspirin 81 MG EC tablet Take 1 tablet by mouth Daily.   Yes Kong Brennan MD   clopidogrel (PLAVIX) 75 MG tablet Take 1 tablet by mouth Daily. 23 Yes Young Poon MD   losartan-hydrochlorothiazide (HYZAAR) 100-25 MG per tablet Take 1 tablet by mouth Daily.   Yes Kong Brennan MD   metoprolol succinate XL (TOPROL-XL) 50 MG 24 hr tablet Take 1 tablet by mouth Daily. 22   Kong Brennan MD   nitroglycerin (NITROSTAT) 0.4 MG SL tablet 1 under the tongue as needed for angina, may repeat q5mins for up three doses 21   Pradip Mcneill MD   pravastatin (PRAVACHOL) 40 MG tablet Take 1 tablet by mouth Every Night. 18   Kong Brennan MD   terazosin (HYTRIN) 5 MG capsule Take 1 capsule by mouth Every Night. 3/17/23   Kong Brennan MD         Objective     tMax 24 hours:  Temp (24hrs), Av.1 °F (36.7 °C), Min:98 °F (36.7 °C), Max:98.4 °F (36.9 °C)    Vital Sign Ranges:  Temp:  [98 °F (36.7 °C)-98.4 °F (36.9 °C)] 98.4 °F (36.9 °C)  Heart Rate:  [75-87] 75  Resp:  [16-20] 16  BP: (101-141)/(46-79) 129/64  Intake and Output Last 3 Shifts:  I/O last 3 completed shifts:  In: 100 [IV Piggyback:100]  Out: 500 [Urine:500]      Physical Exam:     General Appearance:  Alert, cooperative, in no acute distress   Head:  Normocephalic, without obvious abnormality, atraumatic   Eyes:  Lids and lashes normal, conjunctivae and sclerae normal, no icterus, no pallor, corneas clear, PERRLA   Ears:  Ears appear intact with no abnormalities noted   Throat:  No oral lesions, no thrush, oral mucosa moist   Neck:  No  adenopathy, supple, trachea midline, no thyromegaly, no carotid bruit, no JVD   Back:  No kyphosis present, no scoliosis present, no skin lesions, erythema or scars, no tenderness to percussion or palpation, range of motion normal   Lungs:  Clear to auscultation, respirations regular, even and unlabored    Heart:  Regular rhythm and normal rate, normal S1 and S2, no murmur, no gallop, no rub, no click   Chest Wall:  No abnormalities observed   Abdomen:  Normal bowel sounds, no masses, no organomegaly, soft non-tender, non-distended, no guarding, no rebound tenderness   Rectal:  Deferred   Extremities:  Moves all extremities well, no edema, no cyanosis, no redness   Pulses:  Pulses palpable and equal bilaterally   Skin:  No bleeding, bruising or rash   Lymph nodes:  No palpable adenopathy   Neurologic:  Cranial nerves 2 - 12 grossly intact, sensation intact, DTR present and equal bilaterally                                                                               Results Review:     Lab Results (last 24 hours)       Procedure Component Value Units Date/Time    Basic Metabolic Panel [[id number]]  (Abnormal) Collected: 04/17/24 0452    Specimen: Blood Updated: 04/17/24 0519     Glucose 233 mg/dL      BUN 42 mg/dL      Creatinine 1.66 mg/dL      Sodium 136 mmol/L      Potassium 3.7 mmol/L      Chloride 97 mmol/L      CO2 30.0 mmol/L      Calcium 9.1 mg/dL      BUN/Creatinine Ratio 25.3     Anion Gap 9.0 mmol/L      eGFR 44.1 mL/min/1.73     Narrative:      GFR Normal >60  Chronic Kidney Disease <60  Kidney Failure <15      CBC Auto Differential [[id number]]  (Abnormal) Collected: 04/17/24 0452    Specimen: Blood Updated: 04/17/24 0458     WBC 6.98 10*3/mm3      RBC 4.25 10*6/mm3      Hemoglobin 12.3 g/dL      Hematocrit 35.7 %      MCV 84.0 fL      MCH 28.9 pg      MCHC 34.5 g/dL      RDW 13.2 %      RDW-SD 40.4 fl      MPV 9.9 fL      Platelets 204 10*3/mm3      Neutrophil % 70.2 %      Lymphocyte % 14.8 %       Monocyte % 9.9 %      Eosinophil % 4.7 %      Basophil % 0.1 %      Immature Grans % 0.3 %      Neutrophils, Absolute 4.90 10*3/mm3      Lymphocytes, Absolute 1.03 10*3/mm3      Monocytes, Absolute 0.69 10*3/mm3      Eosinophils, Absolute 0.33 10*3/mm3      Basophils, Absolute 0.01 10*3/mm3      Immature Grans, Absolute 0.02 10*3/mm3      nRBC 0.0 /100 WBC     POC Glucose Once [[id number]]  (Abnormal) Collected: 04/17/24 0037    Specimen: Blood Updated: 04/17/24 0039     Glucose 300 mg/dL      Comment: Serial Number: [id number]:  [id number]       Magnesium [[id number]]  (Normal) Collected: 04/16/24 1913    Specimen: Blood Updated: 04/16/24 2159     Magnesium 2.4 mg/dL     Blood Culture - Blood, Arm, Left [[id number]] Collected: 04/16/24 2014    Specimen: Blood from Arm, Left Updated: 04/16/24 2018    Urinalysis, Microscopic Only - Urine, Clean Catch [[id number]]  (Abnormal) Collected: 04/16/24 1936    Specimen: Urine, Clean Catch Updated: 04/16/24 1953     RBC, UA 0-2 /HPF      WBC, UA Too Numerous to Count /HPF      Bacteria, UA None Seen /HPF      Squamous Epithelial Cells, UA 0-2 /HPF      Hyaline Casts, UA 3-6 /LPF      Methodology Automated Microscopy    Urine Culture - Urine, Urine, Clean Catch [[id number]] Collected: 04/16/24 1936    Specimen: Urine, Clean Catch Updated: 04/16/24 1952    Urinalysis With Culture If Indicated - Urine, Clean Catch [[id number]]  (Abnormal) Collected: 04/16/24 1936    Specimen: Urine, Clean Catch Updated: 04/16/24 1951     Color, UA Yellow     Appearance, UA Cloudy     pH, UA <=5.0     Specific Gravity, UA 1.016     Glucose, UA Negative     Ketones, UA Negative     Bilirubin, UA Negative     Blood, UA Trace     Protein, UA Negative     Leuk Esterase, UA Moderate (2+)     Nitrite, UA Negative     Urobilinogen, UA 1.0 E.U./dL    Narrative:      In absence of clinical symptoms, the presence of pyuria, bacteria, and/or nitrites on the urinalysis result does not correlate with  infection.    Comprehensive Metabolic Panel [[id number]]  (Abnormal) Collected: 04/16/24 1913    Specimen: Blood Updated: 04/16/24 1939     Glucose 163 mg/dL      BUN 36 mg/dL      Creatinine 1.45 mg/dL      Sodium 136 mmol/L      Potassium 3.0 mmol/L      Chloride 93 mmol/L      CO2 31.0 mmol/L      Calcium 9.5 mg/dL      Total Protein 7.4 g/dL      Albumin 4.0 g/dL      ALT (SGPT) 19 U/L      AST (SGOT) 18 U/L      Alkaline Phosphatase 71 U/L      Total Bilirubin 0.5 mg/dL      Globulin 3.4 gm/dL      A/G Ratio 1.2 g/dL      BUN/Creatinine Ratio 24.8     Anion Gap 12.0 mmol/L      eGFR 51.8 mL/min/1.73     Narrative:      GFR Normal >60  Chronic Kidney Disease <60  Kidney Failure <15      Lipase [[id number]]  (Normal) Collected: 04/16/24 1913    Specimen: Blood Updated: 04/16/24 1939     Lipase 49 U/L     COVID-19 and FLU A/B PCR, 1 HR TAT - Swab, Nasopharynx [[id number]]  (Normal) Collected: 04/16/24 1914    Specimen: Swab from Nasopharynx Updated: 04/16/24 1939     COVID19 Not Detected     Influenza A PCR Not Detected     Influenza B PCR Not Detected    Narrative:      Fact sheet for providers: https://www.fda.gov/media/493023/download    Fact sheet for patients: https://www.fda.gov/media/581002/download    Test performed by PCR.    CBC & Differential [[id number]]  (Abnormal) Collected: 04/16/24 1913    Specimen: Blood Updated: 04/16/24 1928    Narrative:      The following orders were created for panel order CBC & Differential.  Procedure                               Abnormality         Status                     ---------                               -----------         ------                     CBC Auto Differential[[id number]]        Abnormal            Final result                 Please view results for these tests on the individual orders.    CBC Auto Differential [[id number]]  (Abnormal) Collected: 04/16/24 1913    Specimen: Blood Updated: 04/16/24 1928     WBC 9.32 10*3/mm3      RBC 4.43 10*6/mm3       "Hemoglobin 12.8 g/dL      Hematocrit 37.4 %      MCV 84.4 fL      MCH 28.9 pg      MCHC 34.2 g/dL      RDW 13.2 %      RDW-SD 40.7 fl      MPV 9.8 fL      Platelets 234 10*3/mm3      Neutrophil % 72.8 %      Lymphocyte % 14.4 %      Monocyte % 7.6 %      Eosinophil % 4.6 %      Basophil % 0.3 %      Immature Grans % 0.3 %      Neutrophils, Absolute 6.78 10*3/mm3      Lymphocytes, Absolute 1.34 10*3/mm3      Monocytes, Absolute 0.71 10*3/mm3      Eosinophils, Absolute 0.43 10*3/mm3      Basophils, Absolute 0.03 10*3/mm3      Immature Grans, Absolute 0.03 10*3/mm3      nRBC 0.0 /100 WBC     POC Lactate [[id number]]  (Normal) Collected: 04/16/24 1919    Specimen: Blood Updated: 04/16/24 1921     Lactate 1.0 mmol/L      Comment: Serial Number: [id number]:  [id number]       Blood Culture - Blood, Arm, Right [[id number]] Collected: 04/16/24 1913    Specimen: Blood from Arm, Right Updated: 04/16/24 1918           No results found for: \"URINECX\"     Imaging Results (Last 7 Days)       Procedure Component Value Units Date/Time    CT Abdomen Pelvis Without Contrast [[id number]] Collected: 04/16/24 2029     Updated: 04/16/24 2058    Narrative:      CT ABDOMEN PELVIS WO CONTRAST    Date of Exam: 4/16/2024 8:18 PM EDT    Indication: flank pain, blood in urine, fever.    Comparison: 9/20/2023    Technique: Axial CT images were obtained of the abdomen and pelvis without the administration of contrast. Sagittal and coronal reconstructions were performed.  Automated exposure control and iterative reconstruction methods were used.      Findings:  Visualized Chest:  The visualized lung bases and lower mediastinal structures are unremarkable.    Liver: Liver is normal in size and CT density. No focal lesions.    Gallbladder: Status post cholecystectomy    Bile Ducts: No billiary dcutal dilation.    Spleen: Spleen is normal in size and CT density.    Pancreas: Pancreas is normal. There is no evidence of pancreatic mass or " peripancreatic fluid.    Adrenals: Adrenal glands are unremarkable.    Kidneys: Punctate nonobstructing stone in the left lower pole. No obstructing stones or hydronephrosis.    Gastrointestinal: Very limited due to lack of IV and oral contrast.    Bladder: A large calculus is noted within the urinary bladder measuring approximately 2.5 x 1.2 cm. Mild circumferential bladder wall thickening    Pelvis: 2 focal calcifications are noted in the region of the prostate measuring 1.4 and 1.2 cm, presumably within the urethra. These are concerning for urethral stones.    Peritoneum/Mesentery: No fluid collection, ascities, or free air.      Lymph Nodes: No lymphadenopathy.    Vasculature: Scattered vascular calcifications of the abdominal aorta. Otherwise unremarkable.    Abdominal Wall: Unremarkable    Bony Structures: No acute osseous abnormality      Impression:      Impression:  A large calculus is noted within the urinary bladder, although this has decreased in size since prior study. Additional 2 focal calcifications are noted within the region of the prostate gland measuring 1.4 and 1.2 cm presumably within the urethra. These   most likely represent fractured stone that are now present within the urethra.    Mild circumferential bladder wall thickening, nonspecific but may represent cystitis.    Punctate nonobstructing stone in the left lower pole. No hydronephrosis.            Electronically Signed: Zane Bernabe DO    4/16/2024 8:56 PM EDT    Workstation ID: [id number]            Inpatient Meds:   Scheduled Meds:atorvastatin, 10 mg, Oral, Daily  cefTRIAXone, 2,000 mg, Intravenous, Q24H  insulin lispro, 2-7 Units, Subcutaneous, 4x Daily With Meals & Nightly  sodium chloride, 10 mL, Intravenous, Q12H       Continuous Infusions:sodium chloride, 50 mL/hr       PRN Meds:.  senna-docusate sodium **AND** polyethylene glycol **AND** bisacodyl **AND** bisacodyl    dextrose    dextrose    glucagon (human recombinant)     ondansetron    sodium chloride    sodium chloride    sodium chloride      Assessment & Plan     Bladder calculi and urethral calculi despite having a cystolitholopaxy with Dr. Rowley recently.  Will plan on doing a cystolitholopaxy today with Dr. Shah using the laser to remove all the stone fragments.  Discussed with patient all risk benefits and options and he is willing to proceed    I discussed the patient's findings and my recommendations with patient.    Trent Bowden MD  04/17/24  07:30 EDT

## 2024-04-17 NOTE — ANESTHESIA PROCEDURE NOTES
Airway  Urgency: elective    Date/Time: 4/17/2024 5:20 PM  Airway not difficult    General Information and Staff    Patient location during procedure: OR  CRNA/CAA: Radha Schmitz CRNA    Indications and Patient Condition  Indications for airway management: airway protection    Preoxygenated: yes (Pt pre-O2 with 100% O2)  Mask difficulty assessment: 0 - not attempted    Final Airway Details  Final airway type: supraglottic airway      Successful airway: I-gel  Size 5     Number of attempts at approach: 1  Assessment: lips, teeth, and gum same as pre-op and atraumatic intubation    Additional Comments  ATOLMA x1. No change in dentition. + ETCO2. Airway seal pressure <20cm H2O.

## 2024-04-17 NOTE — PROGRESS NOTES
[facility] MEDICINE SERVICE  DAILY PROGRESS NOTE    NAME: Codey Rhodes  : 1955  MRN: [id number]      LOS: 0 days     PROVIDER OF SERVICE: KEVIN Forbes    Chief Complaint: Hematuria    Subjective:     Interval History:  History taken from: patient  Patient Complaints: difficulty urinating  Patient Denies:  SOB, CP, Dizziness, HA    Review of Systems:   Review of Systems   Respiratory:  Negative for shortness of breath.    Cardiovascular:  Negative for chest pain.   Genitourinary:  Positive for difficulty urinating.   Neurological:  Negative for dizziness and headaches.       Objective:     Vital Signs  Temp:  [97.3 °F (36.3 °C)-98.4 °F (36.9 °C)] 98.3 °F (36.8 °C)  Heart Rate:  [69-87] 77  Resp:  [14-20] 14  BP: (101-141)/(46-83) 130/83   Body mass index is 36.27 kg/m².    Physical Exam  Physical Exam  Constitutional:       Appearance: Normal appearance. He is obese.   HENT:      Head: Normocephalic and atraumatic.      Nose: Nose normal.      Mouth/Throat:      Mouth: Mucous membranes are moist.      Pharynx: Oropharynx is clear.   Eyes:      Extraocular Movements: Extraocular movements intact.      Conjunctiva/sclera: Conjunctivae normal.      Pupils: Pupils are equal, round, and reactive to light.   Cardiovascular:      Rate and Rhythm: Normal rate and regular rhythm.      Pulses: Normal pulses.   Pulmonary:      Effort: Pulmonary effort is normal.   Abdominal:      General: Abdomen is flat.      Palpations: Abdomen is soft.   Musculoskeletal:         General: Normal range of motion.      Cervical back: Normal range of motion and neck supple.   Skin:     General: Skin is warm and dry.   Neurological:      General: No focal deficit present.      Mental Status: He is alert and oriented to person, place, and time. Mental status is at baseline.   Psychiatric:         Mood and Affect: Mood normal.         Behavior: Behavior normal.         Thought Content: Thought content normal.          Judgment: Judgment normal.         Scheduled Meds   atorvastatin, 10 mg, Oral, Daily  cefTRIAXone, 2,000 mg, Intravenous, Q24H  insulin lispro, 2-7 Units, Subcutaneous, 4x Daily With Meals & Nightly  sodium chloride, 10 mL, Intravenous, Q12H       PRN Meds     senna-docusate sodium **AND** polyethylene glycol **AND** bisacodyl **AND** bisacodyl    dextrose    dextrose    glucagon (human recombinant)    ondansetron    sodium chloride    sodium chloride    sodium chloride   Infusions  sodium chloride, 50 mL/hr, Last Rate: 50 mL/hr (04/17/24 0746)          Diagnostic Data    Results from last 7 days   Lab Units 04/17/24  0452 04/16/24  1913   WBC 10*3/mm3 6.98 9.32   HEMOGLOBIN g/dL 12.3* 12.8*   HEMATOCRIT % 35.7* 37.4*   PLATELETS 10*3/mm3 204 234   GLUCOSE mg/dL 233* 163*   CREATININE mg/dL 1.66* 1.45*   BUN mg/dL 42* 36*   SODIUM mmol/L 136 136   POTASSIUM mmol/L 3.7 3.0*   AST (SGOT) U/L  --  18   ALT (SGPT) U/L  --  19   ALK PHOS U/L  --  71   BILIRUBIN mg/dL  --  0.5   ANION GAP mmol/L 9.0 12.0       CT Abdomen Pelvis Without Contrast    Result Date: 4/16/2024  Impression: A large calculus is noted within the urinary bladder, although this has decreased in size since prior study. Additional 2 focal calcifications are noted within the region of the prostate gland measuring 1.4 and 1.2 cm presumably within the urethra. These  most likely represent fractured stone that are now present within the urethra. Mild circumferential bladder wall thickening, nonspecific but may represent cystitis. Punctate nonobstructing stone in the left lower pole. No hydronephrosis. Electronically Signed: Zane Bernabe DO  4/16/2024 8:56 PM EDT  Workstation ID: [id number]       I reviewed the patient's new clinical results.    Assessment/Plan:     Active and Resolved Problems  Active Hospital Problems    Diagnosis  POA    **Hematuria [R31.9]  Yes      Resolved Hospital Problems   No resolved problems to display.        Hematuria  Intermittent RLQ pain and LUQ pain   UTI  - CT abdomen and pelvis: A large calculus is noted within the urinary bladder, although this has decreased in size since prior study. Additional 2 focal calcifications are noted within the region of the prostate gland measuring 1.4 and 1.2 cm presumably within the urethra. These   most likely represent fractured stone that are now present within the urethra.Mild circumferential bladder wall thickening, nonspecific but may represent cystitis. Punctate nonobstructing stone in the left lower pole. No hydronephrosis.   - continue Rocephin   - Pt denies need for pain or nausea medication at this time  - hold ASA and plavix for hematuria and procedure  - urology plans for cystolitholapaxy today to remove stone fragments  - discharge pending urology recommendations     Hypokalemia- resolved  - potassium chloride 50kphV7 overnight  - K+ this AM 3.7  -replacement protocol as needed     Acute kidney injury  -Urology had requested no IV fluids at this time  -Likely secondary to obstructive uropathy  -Will monitor labs post procedure     DM II  - accucheck  - SS insulin     HTN  CAD/s/p stent   - BP stable  - hold medication at this time   - resume as appropriate     HLD  - statin      Vocal cord polyps   - chronic    DVT prophylaxis:  Mechanical DVT prophylaxis orders are present.         Code status is   Code Status and Medical Interventions:   Ordered at: 04/17/24 0033     Code Status (Patient has no pulse and is not breathing):    CPR (Attempt to Resuscitate)     Medical Interventions (Patient has pulse or is breathing):    Full Support       Plan for disposition:Home tomorrow morning pending improvement in labs/hematuria    Time: 30 minutes    Signature: Electronically signed by KEVIN Forbes, 04/17/24, 11:41 EDT.  [facility], [place] Hospitalist Team

## 2024-04-17 NOTE — ED NOTES
PT resting bedside. A&Ox4 , perrla . Pt states they are awaiting to be taken to sx . No further c/o

## 2024-04-17 NOTE — ANESTHESIA PREPROCEDURE EVALUATION
Anesthesia Evaluation     Patient summary reviewed and Nursing notes reviewed   NPO Solid Status: > 8 hours             Airway   Mallampati: II  TM distance: >3 FB  Neck ROM: full  No difficulty expected  Dental - normal exam   (+) edentulous    Pulmonary - normal exam   (+) a smoker Current, cigarettes,  Cardiovascular - normal exam    ECG reviewed  PT is on anticoagulation therapy    (+) hypertension well controlled, CAD, cardiac stents , hyperlipidemia  (-) angina, HAYNES    ROS comment: Stopped Plavix 2 days ago    Neuro/Psych- negative ROS  GI/Hepatic/Renal/Endo    (+) obesity, renal disease- stones, diabetes mellitus type 2    Musculoskeletal (-) negative ROS    Abdominal  - normal exam    Bowel sounds: normal.   Substance History - negative use     OB/GYN negative ob/gyn ROS         Other                    Anesthesia Plan    ASA 3     general       Anesthetic plan, risks, benefits, and alternatives have been provided, discussed and informed consent has been obtained with: patient.    CODE STATUS:    Code Status (Patient has no pulse and is not breathing): CPR (Attempt to Resuscitate)  Medical Interventions (Patient has pulse or is breathing): Full Support

## 2024-04-18 ENCOUNTER — APPOINTMENT (OUTPATIENT)
Dept: RESPIRATORY THERAPY | Facility: HOSPITAL | Age: 69
End: 2024-04-18
Payer: MEDICARE

## 2024-04-18 VITALS
SYSTOLIC BLOOD PRESSURE: 147 MMHG | WEIGHT: 238.54 LBS | RESPIRATION RATE: 12 BRPM | OXYGEN SATURATION: 100 % | DIASTOLIC BLOOD PRESSURE: 75 MMHG | TEMPERATURE: 97.9 F | BODY MASS INDEX: 36.15 KG/M2 | HEIGHT: 68 IN | HEART RATE: 75 BPM

## 2024-04-18 LAB
ANION GAP SERPL CALCULATED.3IONS-SCNC: 11 MMOL/L (ref 5–15)
BACTERIA SPEC AEROBE CULT: NO GROWTH
BUN SERPL-MCNC: 26 MG/DL (ref 8–23)
BUN/CREAT SERPL: 21.5 (ref 7–25)
CALCIUM SPEC-SCNC: 9.4 MG/DL (ref 8.6–10.5)
CHLORIDE SERPL-SCNC: 96 MMOL/L (ref 98–107)
CO2 SERPL-SCNC: 30 MMOL/L (ref 22–29)
CREAT SERPL-MCNC: 1.21 MG/DL (ref 0.76–1.27)
EGFRCR SERPLBLD CKD-EPI 2021: 64.4 ML/MIN/1.73
GEN 5 2HR TROPONIN T REFLEX: 13 NG/L
GLUCOSE BLDC GLUCOMTR-MCNC: 229 MG/DL (ref 70–105)
GLUCOSE BLDC GLUCOMTR-MCNC: 349 MG/DL (ref 70–105)
GLUCOSE SERPL-MCNC: 361 MG/DL (ref 65–99)
MAGNESIUM SERPL-MCNC: 2.2 MG/DL (ref 1.6–2.4)
POTASSIUM SERPL-SCNC: 3.3 MMOL/L (ref 3.5–5.2)
SODIUM SERPL-SCNC: 137 MMOL/L (ref 136–145)
TROPONIN T DELTA: -6 NG/L
TROPONIN T SERPL HS-MCNC: 19 NG/L

## 2024-04-18 PROCEDURE — 63710000001 POTASSIUM CHLORIDE 20 MEQ TABLET CONTROLLED-RELEASE: Performed by: INTERNAL MEDICINE

## 2024-04-18 PROCEDURE — 83735 ASSAY OF MAGNESIUM: CPT | Performed by: NURSE PRACTITIONER

## 2024-04-18 PROCEDURE — 63710000001 ASPIRIN 81 MG TABLET DELAYED-RELEASE: Performed by: NURSE PRACTITIONER

## 2024-04-18 PROCEDURE — 80048 BASIC METABOLIC PNL TOTAL CA: CPT | Performed by: NURSE PRACTITIONER

## 2024-04-18 PROCEDURE — A9270 NON-COVERED ITEM OR SERVICE: HCPCS | Performed by: INTERNAL MEDICINE

## 2024-04-18 PROCEDURE — 63710000001 ATORVASTATIN 10 MG TABLET: Performed by: UROLOGY

## 2024-04-18 PROCEDURE — 63710000001 PHENAZOPYRIDINE 200 MG TABLET: Performed by: UROLOGY

## 2024-04-18 PROCEDURE — 82948 REAGENT STRIP/BLOOD GLUCOSE: CPT | Performed by: UROLOGY

## 2024-04-18 PROCEDURE — A9270 NON-COVERED ITEM OR SERVICE: HCPCS | Performed by: UROLOGY

## 2024-04-18 PROCEDURE — G0378 HOSPITAL OBSERVATION PER HR: HCPCS

## 2024-04-18 PROCEDURE — A9270 NON-COVERED ITEM OR SERVICE: HCPCS | Performed by: NURSE PRACTITIONER

## 2024-04-18 PROCEDURE — 63710000001 CLOPIDOGREL 75 MG TABLET: Performed by: NURSE PRACTITIONER

## 2024-04-18 PROCEDURE — 99214 OFFICE O/P EST MOD 30 MIN: CPT | Performed by: NURSE PRACTITIONER

## 2024-04-18 PROCEDURE — 84484 ASSAY OF TROPONIN QUANT: CPT | Performed by: NURSE PRACTITIONER

## 2024-04-18 PROCEDURE — 63710000001 METOPROLOL SUCCINATE XL 50 MG TABLET SUSTAINED-RELEASE 24 HOUR: Performed by: NURSE PRACTITIONER

## 2024-04-18 PROCEDURE — 63710000001 INSULIN LISPRO (HUMAN) PER 5 UNITS: Performed by: UROLOGY

## 2024-04-18 RX ORDER — POTASSIUM CHLORIDE 20 MEQ/1
40 TABLET, EXTENDED RELEASE ORAL ONCE
Status: COMPLETED | OUTPATIENT
Start: 2024-04-18 | End: 2024-04-18

## 2024-04-18 RX ORDER — CEFUROXIME AXETIL 500 MG/1
500 TABLET ORAL 2 TIMES DAILY
Qty: 12 TABLET | Refills: 0 | Status: SHIPPED | OUTPATIENT
Start: 2024-04-18

## 2024-04-18 RX ORDER — CLOPIDOGREL BISULFATE 75 MG/1
75 TABLET ORAL DAILY
Status: DISCONTINUED | OUTPATIENT
Start: 2024-04-18 | End: 2024-04-18 | Stop reason: HOSPADM

## 2024-04-18 RX ORDER — METOPROLOL SUCCINATE 50 MG/1
50 TABLET, EXTENDED RELEASE ORAL
Status: DISCONTINUED | OUTPATIENT
Start: 2024-04-18 | End: 2024-04-18 | Stop reason: HOSPADM

## 2024-04-18 RX ORDER — PHENAZOPYRIDINE HYDROCHLORIDE 200 MG/1
200 TABLET, FILM COATED ORAL 3 TIMES DAILY PRN
Status: DISCONTINUED | OUTPATIENT
Start: 2024-04-18 | End: 2024-04-18 | Stop reason: HOSPADM

## 2024-04-18 RX ORDER — ASPIRIN 81 MG/1
81 TABLET ORAL DAILY
Status: DISCONTINUED | OUTPATIENT
Start: 2024-04-18 | End: 2024-04-18 | Stop reason: HOSPADM

## 2024-04-18 RX ADMIN — Medication 10 ML: at 09:56

## 2024-04-18 RX ADMIN — ATORVASTATIN CALCIUM 10 MG: 10 TABLET, FILM COATED ORAL at 09:55

## 2024-04-18 RX ADMIN — POTASSIUM CHLORIDE 40 MEQ: 1500 TABLET, EXTENDED RELEASE ORAL at 15:08

## 2024-04-18 RX ADMIN — ASPIRIN 81 MG: 81 TABLET, COATED ORAL at 09:55

## 2024-04-18 RX ADMIN — PHENAZOPYRIDINE 200 MG: 200 TABLET ORAL at 15:44

## 2024-04-18 RX ADMIN — INSULIN LISPRO 5 UNITS: 100 INJECTION, SOLUTION INTRAVENOUS; SUBCUTANEOUS at 12:46

## 2024-04-18 RX ADMIN — CLOPIDOGREL BISULFATE 75 MG: 75 TABLET ORAL at 09:55

## 2024-04-18 RX ADMIN — INSULIN LISPRO 3 UNITS: 100 INJECTION, SOLUTION INTRAVENOUS; SUBCUTANEOUS at 09:55

## 2024-04-18 RX ADMIN — METOPROLOL SUCCINATE 50 MG: 50 TABLET, EXTENDED RELEASE ORAL at 09:55

## 2024-04-18 NOTE — CONSULTS
Referring Provider: Kaitlynn Coleman MD    Reason for Consultation:      Reported ventricular tachycardia      Patient Care Team:  Ta Islas MD as PCP - General      SUBJECTIVE     Chief Complaint: Flank pain    History of present illness:  Codey Rhodes is a 69 y.o. male with a history of artery disease who presented to [facility] with complaint of fever, chills, body aches associated with left flank pain.    Evaluation in the emergency room includes CT of his abdomen and pelvis which showed a large calculus noted in the urinary bladder 2 additional focal calcifications are noted within the region of the prostate presumably within the urethra.  Mild circumferential bladder wall thickening a punctate nonobstructing stone left lower pole.  Urology was consulted patient underwent cystolitholopaxy by Dr. Shah.  Cardiology was consulted due to reported ventricular tachycardia    This morning the patient denies chest pain.  He reports he has had some recent dizziness and lightheadedness prior to admission.  He was recently started on losartan with HCTZ by his primary care physician in addition to his other antihypertensive agents.    Review of telemetry here shows sinus rhythm with intermittent isolated PVCs but no ventricular tachycardia.    Review of systems:    Constitutional: No weakness, fatigue, fever, rigors, chills   Eyes: No vision changes, eye pain   ENT/oropharynx: No difficulty swallowing, sore throat, epistaxis, changes in hearing   Cardiovascular: No chest pain, chest tightness, palpitations, paroxysmal nocturnal dyspnea, orthopnea, diaphoresis, occasional dizziness / syncopal episode   Respiratory: No shortness of breath, dyspnea on exertion, cough, wheezing, hemoptysis   Gastrointestinal: No abdominal pain, nausea, vomiting, diarrhea, bloody stools   Genitourinary: No hematuria, dysuria   Neurological: No headache, tremors, numbness, one-sided weakness    Musculoskeletal: No  cramps, myalgias, joint pain, joint swelling   Integument: No rash, edema        Personal History:      Past Medical History:   Diagnosis Date    Coronary artery disease involving native coronary artery of native heart 2016    PCI  PCI RCA 16 PCI to LAD 2018    Hypertension     Mixed hyperlipidemia 2019    Type 2 diabetes mellitus without complications 2019       Past Surgical History:   Procedure Laterality Date    CARDIAC CATHETERIZATION      CARDIAC CATHETERIZATION Right 2023    Procedure: LEFT HEART CATH with possible PCI;  Surgeon: Alcira Garcia MD;  Location: [facility] CATH INVASIVE LOC[facility];  Service: Cardiovascular;  Laterality: Right;  Local and IV sedation    CORONARY STENT PLACEMENT         Family History   Problem Relation Age of Onset    Heart disease Mother     COPD Mother     Cancer Mother     No Known Problems Father        Social History     Tobacco Use    Smoking status: Every Day     Current packs/day: 0.00     Types: Cigarettes     Last attempt to quit:      Years since quittin.3    Smokeless tobacco: Never    Tobacco comments:     Patient advised to stop smoking   Vaping Use    Vaping status: Never Used   Substance Use Topics    Alcohol use: Not Currently    Drug use: Never        Home meds:  Prior to Admission medications    Medication Sig Start Date End Date Taking? Authorizing Provider   amLODIPine (NORVASC) 10 MG tablet Take 1 tablet by mouth Daily. 3/22/23  Yes ProviderKong MD   aspirin 81 MG EC tablet Take 1 tablet by mouth Daily.   Yes ProviderKong MD   clopidogrel (PLAVIX) 75 MG tablet Take 1 tablet by mouth Daily. 23 Yes Young Poon MD   losartan-hydrochlorothiazide (HYZAAR) 100-25 MG per tablet Take 1 tablet by mouth Daily.   Yes ProviderKong MD   cefuroxime (CEFTIN) 500 MG tablet Take 1 tablet by mouth 2 (Two) Times a Day. 24   Kaitlynn Coleman MD   metoprolol succinate  "XL (TOPROL-XL) 50 MG 24 hr tablet Take 1 tablet by mouth Daily. 11/25/22   Kong Brennan MD   nitroglycerin (NITROSTAT) 0.4 MG SL tablet 1 under the tongue as needed for angina, may repeat q5mins for up three doses 6/28/21   Pradip Mcneill MD   pravastatin (PRAVACHOL) 40 MG tablet Take 1 tablet by mouth Every Night. 5/18/18   Kong Brennan MD   terazosin (HYTRIN) 5 MG capsule Take 1 capsule by mouth Every Night. 3/17/23   Kong Brennan MD       Allergies:     Crestor [rosuvastatin]    Scheduled Meds:atorvastatin, 10 mg, Oral, Daily  cefTRIAXone, 2,000 mg, Intravenous, Q24H  insulin lispro, 2-7 Units, Subcutaneous, 4x Daily With Meals & Nightly  sodium chloride, 10 mL, Intravenous, Q12H      Continuous Infusions:lactated ringers, 1,000 mL, Last Rate: Stopped (04/17/24 1805)  sodium chloride, 50 mL/hr, Last Rate: 50 mL/hr (04/17/24 2034)      PRN Meds:  senna-docusate sodium **AND** polyethylene glycol **AND** bisacodyl **AND** bisacodyl    dextrose    dextrose    glucagon (human recombinant)    ondansetron    sodium chloride    sodium chloride    sodium chloride      OBJECTIVE    Vital Signs  Vitals:    04/17/24 1956 04/17/24 2300 04/18/24 0410 04/18/24 0731   BP: 141/72 148/99 156/87 155/82   BP Location: Left arm Left arm Left arm Left arm   Patient Position: Lying Lying Lying Lying   Pulse: 81 81 71 68   Resp: 16 18 18 16   Temp: 97.7 °F (36.5 °C) 97.9 °F (36.6 °C) 97.7 °F (36.5 °C) 97.9 °F (36.6 °C)   TempSrc: Oral Oral Oral Oral   SpO2: 97% 99% 96% 98%   Weight:       Height:           Flowsheet Rows      Flowsheet Row First Filed Value   Admission Height 172.7 cm (68\") Documented at 04/16/2024 1829   Admission Weight 108 kg (238 lb 8.6 oz) Documented at 04/16/2024 1829              Intake/Output Summary (Last 24 hours) at 4/18/2024 0847  Last data filed at 4/18/2024 0406  Gross per 24 hour   Intake 640 ml   Output -2245 ml   Net 2885 ml        Telemetry: Sinus rhythm with " isolated PVCs    Physical Exam:  The patient is alert, oriented and in no distress.  Vital signs as noted above.  Head and neck revealed no carotid bruits or jugular venous distention.  No thyromegaly or lymphadenopathy is present  Lungs clear.  No wheezing.  Breath sounds are normal bilaterally.  Heart: Normal first and second heart sounds. No murmur.  No precordial rub is present.  No gallop is present.  Abdomen: Soft and nontender.  No organomegaly is present.  Extremities with good peripheral pulses without any pedal edema.  Skin: Warm and dry.  Musculoskeletal system is grossly normal.  CNS grossly normal.       Results Review:  I have personally reviewed the results from the time of this admission to 4/18/2024 08:47 EDT and agree with these findings:  [x]  Laboratory  []  Microbiology  [x]  Radiology  [x]  EKG/Telemetry   []  Cardiology/Vascular   []  Pathology  []  Old records  []  Other:    Most notable findings include:     Lab Results (last 24 hours)       Procedure Component Value Units Date/Time    POC Glucose 4x Daily Before Meals & at Bedtime [[id number]]  (Abnormal) Collected: 04/18/24 0730    Specimen: Blood Updated: 04/18/24 0731     Glucose 229 mg/dL      Comment: Serial Number: [id number]:  [id number]       POC Glucose Once [[id number]]  (Abnormal) Collected: 04/17/24 2033    Specimen: Blood Updated: 04/17/24 2035     Glucose 309 mg/dL      Comment: Serial Number: [id number]:  [id number]       Blood Culture - Blood, Arm, Left [[id number]]  (Normal) Collected: 04/16/24 2014    Specimen: Blood from Arm, Left Updated: 04/17/24 2030     Blood Culture No growth at 24 hours    Blood Culture - Blood, Arm, Right [[id number]]  (Normal) Collected: 04/16/24 1913    Specimen: Blood from Arm, Right Updated: 04/17/24 1931     Blood Culture No growth at 24 hours    STONE ANALYSIS - Calculus, Urinary Bladder [[id number]] Collected: 04/17/24 1822    Specimen: Calculus from Urinary Bladder Updated: 04/17/24  1836    POC Glucose STAT [[id number]]  (Abnormal) Collected: 04/17/24 1833    Specimen: Blood Updated: 04/17/24 1835     Glucose 155 mg/dL      Comment: Serial Number: [id number]:  [id number]       POC Glucose Once [[id number]]  (Abnormal) Collected: 04/17/24 1537    Specimen: Blood Updated: 04/17/24 1540     Glucose 147 mg/dL      Comment: Serial Number: [id number]:  [id number]       POC Glucose 4x Daily Before Meals & at Bedtime [[id number]]  (Abnormal) Collected: 04/17/24 1124    Specimen: Blood Updated: 04/17/24 1126     Glucose 193 mg/dL      Comment: Serial Number: [id number]:  [id number]       Urine Culture - Urine, Urine, Clean Catch [[id number]]  (Normal) Collected: 04/16/24 1936    Specimen: Urine, Clean Catch Updated: 04/17/24 1010     Urine Culture No growth            Imaging Results (Last 24 Hours)       ** No results found for the last 24 hours. **            LAB RESULTS (LAST 7 DAYS)    CBC  Results from last 7 days   Lab Units 04/17/24  0452 04/16/24 1913   WBC 10*3/mm3 6.98 9.32   RBC 10*6/mm3 4.25 4.43   HEMOGLOBIN g/dL 12.3* 12.8*   HEMATOCRIT % 35.7* 37.4*   MCV fL 84.0 84.4   PLATELETS 10*3/mm3 204 234       BMP  Results from last 7 days   Lab Units 04/17/24  0452 04/16/24 1913   SODIUM mmol/L 136 136   POTASSIUM mmol/L 3.7 3.0*   CHLORIDE mmol/L 97* 93*   CO2 mmol/L 30.0* 31.0*   BUN mg/dL 42* 36*   CREATININE mg/dL 1.66* 1.45*   GLUCOSE mg/dL 233* 163*   MAGNESIUM mg/dL  --  2.4       CMP   Results from last 7 days   Lab Units 04/17/24  0452 04/16/24 1913   SODIUM mmol/L 136 136   POTASSIUM mmol/L 3.7 3.0*   CHLORIDE mmol/L 97* 93*   CO2 mmol/L 30.0* 31.0*   BUN mg/dL 42* 36*   CREATININE mg/dL 1.66* 1.45*   GLUCOSE mg/dL 233* 163*   ALBUMIN g/dL  --  4.0   BILIRUBIN mg/dL  --  0.5   ALK PHOS U/L  --  71   AST (SGOT) U/L  --  18   ALT (SGPT) U/L  --  19   LIPASE U/L  --  49       BNP        TROPONIN        CoAg        Creatinine Clearance  Estimated Creatinine Clearance: 49.3  mL/min (A) (by C-G formula based on SCr of 1.66 mg/dL (H)).    ABG          Radiology  CT Abdomen Pelvis Without Contrast    Result Date: 4/16/2024  Impression: A large calculus is noted within the urinary bladder, although this has decreased in size since prior study. Additional 2 focal calcifications are noted within the region of the prostate gland measuring 1.4 and 1.2 cm presumably within the urethra. These  most likely represent fractured stone that are now present within the urethra. Mild circumferential bladder wall thickening, nonspecific but may represent cystitis. Punctate nonobstructing stone in the left lower pole. No hydronephrosis. Electronically Signed: Zane Bernabe DO  4/16/2024 8:56 PM EDT  Workstation ID: [id number]       EKG  I personally viewed and interpreted the patient's EKG/Telemetry data:  ECG 12 Lead Rhythm Change   Preliminary Result   HEART RATE= 66  bpm   RR Interval= 912  ms   TN Interval= 153  ms   P Horizontal Axis=   deg   P Front Axis= 72  deg   QRSD Interval= 80  ms   QT Interval= 378  ms   QTcB= 396  ms   QRS Axis= 55  deg   T Wave Axis= 53  deg   - NORMAL ECG -   Sinus rhythm   When compared with ECG of 20-Mar-2024 12:03:02,   No significant change   Electronically Signed By:    Date and Time of Study: 2024-04-17 13:46:36                  Echocardiogram:          Stress Test:  Results for orders placed during the hospital encounter of 12/22/23    Stress Test With Myocardial Perfusion One Day    Interpretation Summary  Indications  Chest pain    This study was performed under the direct supervision and [facility] nurse practitioner..    Resting ECG  Sinus rhythm    The patient was injected with Lexiscan intravenously while constantly monitoring electrocardiogram and vital signs.  Patient did not have any chest discomfort ST abnormalities or ectopy with injection of Lexiscan.    Cardiolite was used as an imaging agent.    Cardiolite images showed mild inferior and apical  ischemia.    Gated SPECT images revealed normal left ventricle size and contractility with ejection fraction of 66%.    Impression  ========  Lexiscan Cardiolite test showed mild inferior and apical ischemia.  Gated SPECT images revealed normal left ventricular size and contractility with ejection fraction of 66%.        Cardiac Catheterization:  Results for orders placed during the hospital encounter of 12/22/23    Cardiac Catheterization/Vascular Study    Conclusion  Table formatting from the original result was not included.  Cardiac Catheterization with PCI Report    Codey Rhodes  [id number]  12/24/2023  @PCP@    He underwent cardiac catheterization and percutaneous coronary intervention.    Indications for the procedure include: acute coronary syndrome.  Patient presented with symptoms of unstable angina and abnormal stress test with refractory recurrent chest pain on maximal medical therapy    Procedure Details:  The risks, benefits, complications, treatment options, and expected outcomes were discussed with the patient. The patient and/or family concurred with the proposed plan, giving informed consent.    After informed consent the patient was brought to the cath lab after appropriate IV hydration was begun and oral premedication was given. He was further sedated with fentanyl. He was prepped and draped in the usual manner. Using the modified Seldinger access technique, a 6 Tajik sheath was placed in the femoral artery. A left heart catheterization with coronary arteriography was performed. Findings are discussed below.    The decision was made to proceed with intervention. He received Heparin as per protocol. The details of the intervention are as follows:    For the interventional procedure we used a JR4 guide catheter with a BMW guidewire to cross the lesion in the distal portion of the right coronary artery lesion is directly stented with a 3.0 x 15 mm Xience drug-eluting stent that was deployed at 14  jeromy with good angiographic result.  Patient tolerated the procedure very well with no immediate postprocedure complications plan to obtain hemostasis by manual pressure.  Procedural anticoagulation was heparin patient received 600 mg of Plavix at the end of the procedure.    After the procedure was completed, sedation was stopped and the sheaths and catheters were all removed according to established hospital protocols.    Conscious sedation:  Conscious sedation was performed according to protocol.  I supervised and directed an independent trained observer with the assistance of monitoring the patient's level of consciousness and physiologic status throughout the procedure.  Intraoperative service time was 90 minutes.    Findings:    Hemodynamics Aortic pressure systolic 166 diastolic 75 with a mean pressure of 110 mmHg  Left Main Large-caliber vessel angiographically normal bifurcates into left artery descending and left circumflex arteries  RCA Large-caliber vessel dominant vessel  Proximal right coronary artery has angiographic 30 to 40% stenosis  Mid right coronary artery has a patent stent with mild in-stent restenosis angiographically 10 to 20%  Distal right coronary artery has a focal area of angiographic 80 to 90% stenosis likely the culprit vessel for patient's symptoms of unstable angina  Right coronary artery provides a moderate caliber PDA and PLB branches that are angiographically normal  LAD Large-caliber vessel  LAD has a patent stent in the proximal and midportion  Distal edge of the LAD stent has angiographic 20 to 30% stenosis  Mid to distal LAD has another focal area of angiographic 50% stenosis  First diagonal branch of the LAD is a moderate caliber vessel angiographically normal  Circ Moderate to large caliber vessel  First marginal branch of the left circumflex artery is almost like a ramus intermediate branch has a patent stent in the proximal portion  Proximal to the stent in the ostial  portion of the ramus inferior branch there is angiographic 30 to 40% stenosis  Mid ramus intermediate branch after the stented segment has another focal area of angiographic 40% stenosis  Continuation of the left circumflex artery has a mid angiographic 30 to 40% stenosis  LV Not done  Coronary dominance Right coronary artery    Interventions/Vessels Successful PCI and stenting of the distal right coronary artery with placement of a drug-eluting stent  Guides/Wires BMW  Devices Xience drug-eluting stent 3.0 x 15 mm  Post % Stenosis  0  Pre-procedure GEORGINA flow  3  Post procedure GEORGINA flow  3  Closure Device None  Complications None    Estimated Blood Loss:  Minimal    Specimens: None    Complications:  None; patient tolerated the procedure well.    Disposition: PACU - hemodynamically stable.    Condition: stable    Impressions:  Severe single-vessel coronary artery disease involving the distal right coronary artery culprit lesion for the patient's symptoms of unstable angina successfully treated with placement of a Xience drug-eluting stent  Moderate disease involving the ramus intermediate branch and mid to distal LAD plan to conservatively manage with medical therapy  Patent stents in the LAD mid right coronary artery and also ramus intermediate branch of the left circumflex artery    Recommendations:  Aspirin 81 mg p.o. once a day  Plavix 75 mg p.o. once a day  Observe patient in PCU bed overnight  Test results reviewed and discussed with patient and family        Other:      ASSESSMENT & PLAN:    Principal Problem:    Hematuria    Reported 7 beat run of nonsustained ventricular tachycardia  Unable to review strips  Telemetry with isolated PVCs  Patient having no chest pain  Potassium 3.7, magnesium 2.4  Resume Toprol-XL  Will place M cot at discharge    Bladder stones  Status post cystolitholopaxy    Coronary artery disease  PCI to the RCA in December 2023  Patient currently not having chest pain  Resume dual  antiplatelet therapy    Hypertension by history  Recent low blood pressures with dizziness and lightheadedness  Started after patient began losartan with HCTZ  Would hold losartan with HCTZ at discharge  Patient advised to log blood pressure at home and bring to office    Dyslipidemia  Continue statin    Resume dual antiplatelet therapy  Hold losartan with HCTZ  Resume beta-blockers  Okay for discharge home outpatient follow-up with Dr. Pepe Abdi, KEVIN  04/18/24  08:47 EDT

## 2024-04-18 NOTE — PROGRESS NOTES
"  [facility] DAILY PROGRESS NOTE    Patient Identification  Name: Codey Rhodes  Age: 69 y.o.  Sex: male  :  1955  MRN: [id number]    Date: 2024             Subjective:  Interval History: Status post bladder stone removal yesterday, upset about having to have [place]    Objective:    Scheduled Meds:aspirin, 81 mg, Oral, Daily  atorvastatin, 10 mg, Oral, Daily  cefTRIAXone, 2,000 mg, Intravenous, Q24H  clopidogrel, 75 mg, Oral, Daily  insulin lispro, 2-7 Units, Subcutaneous, 4x Daily With Meals & Nightly  metoprolol succinate XL, 50 mg, Oral, Q24H  sodium chloride, 10 mL, Intravenous, Q12H      Continuous Infusions:lactated ringers, 1,000 mL, Last Rate: Stopped (24)  sodium chloride, 50 mL/hr, Last Rate: 50 mL/hr (24)      PRN Meds:  senna-docusate sodium **AND** polyethylene glycol **AND** bisacodyl **AND** bisacodyl    dextrose    dextrose    glucagon (human recombinant)    ondansetron    sodium chloride    sodium chloride    sodium chloride    Vital signs in last 24 hours:  Temp:  [96.8 °F (36 °C)-98.4 °F (36.9 °C)] 97.9 °F (36.6 °C)  Heart Rate:  [67-92] 75  Resp:  [12-22] 12  BP: (138-168)/(65-99) 147/75    Intake/Output:    Intake/Output Summary (Last 24 hours) at 2024 1235  Last data filed at 2024 1135  Gross per 24 hour   Intake 880 ml   Output -2450 ml   Net 3330 ml       Exam:  /75 (BP Location: Left arm, Patient Position: Lying)   Pulse 75   Temp 97.9 °F (36.6 °C) (Oral)   Resp 12   Ht 172.7 cm (68\")   Wt 108 kg (238 lb 8.6 oz)   SpO2 100%   BMI 36.27 kg/m²     General Appearance:    Alert, cooperative, NAD   Lungs:     Respirations unlabored, no audible wheezing    Heart:    No cyanosis   Abdomen:     Soft, ND    :    No suprapubic distention            Data Review:  All labs (24hrs):   Recent Results (from the past 24 hour(s))   ECG 12 Lead Rhythm Change    Collection Time: 24  1:46 PM   Result Value Ref Range    QT Interval 378 ms    " QTC Interval 396 ms   POC Glucose Once    Collection Time: 04/17/24  3:37 PM    Specimen: Blood   Result Value Ref Range    Glucose 147 (H) 70 - 105 mg/dL   POC Glucose STAT    Collection Time: 04/17/24  6:33 PM    Specimen: Blood   Result Value Ref Range    Glucose 155 (H) 70 - 105 mg/dL   POC Glucose Once    Collection Time: 04/17/24  8:33 PM    Specimen: Blood   Result Value Ref Range    Glucose 309 (H) 70 - 105 mg/dL   POC Glucose 4x Daily Before Meals & at Bedtime    Collection Time: 04/18/24  7:30 AM    Specimen: Blood   Result Value Ref Range    Glucose 229 (H) 70 - 105 mg/dL   High Sensitivity Troponin T 2Hr    Collection Time: 04/18/24 10:26 AM    Specimen: Blood   Result Value Ref Range    HS Troponin T 13 <22 ng/L    Troponin T Delta -6 (L) >=-4 - <+4 ng/L   Basic Metabolic Panel    Collection Time: 04/18/24 10:26 AM    Specimen: Blood   Result Value Ref Range    Glucose 361 (H) 65 - 99 mg/dL    BUN 26 (H) 8 - 23 mg/dL    Creatinine 1.21 0.76 - 1.27 mg/dL    Sodium 137 136 - 145 mmol/L    Potassium 3.3 (L) 3.5 - 5.2 mmol/L    Chloride 96 (L) 98 - 107 mmol/L    CO2 30.0 (H) 22.0 - 29.0 mmol/L    Calcium 9.4 8.6 - 10.5 mg/dL    BUN/Creatinine Ratio 21.5 7.0 - 25.0    Anion Gap 11.0 5.0 - 15.0 mmol/L    eGFR 64.4 >60.0 mL/min/1.73   Magnesium    Collection Time: 04/18/24 10:26 AM    Specimen: Blood   Result Value Ref Range    Magnesium 2.2 1.6 - 2.4 mg/dL   POC Glucose 4x Daily Before Meals & at Bedtime    Collection Time: 04/18/24 11:34 AM    Specimen: Blood   Result Value Ref Range    Glucose 349 (H) 70 - 105 mg/dL      Imaging Results (Last 24 Hours)       ** No results found for the last 24 hours. **             Assessment:    Hematuria      Bladder stones recurrent    Plan:    Status post cystolitholapaxy  Discussed the need for continuous irrigation postoperatively patient voiced understanding  Okay for discharge from urology standpoint follow-up with Dr. Rowley in the office    Shilo Shah,  MD  [facility]  [address], [address]  [place] [address]  Office: [phone]  Available via [employer] Secure Chat  04/18/24  12:35 EDT

## 2024-04-18 NOTE — DISCHARGE SUMMARY
[facility] Medicine Services  Discharge Summary    Date of Service: 24  Patient Name: Codey Rhodes  : 1955  MRN: [id number]    Date of Admission: 2024  Date of Discharge:  24  Primary Care Physician: Ta Islas MD      Presenting Problem:   Hypokalemia [E87.6]  Urethral stone [N21.1]  Chills [R68.83]  Hematuria [R31.9]  ANTHONY (acute kidney injury) [N17.9]  Urinary tract infection without hematuria, site unspecified [N39.0]    Active and Resolved Hospital Problems:  Active Hospital Problems    Diagnosis POA    **Hematuria [R31.9] Yes      Resolved Hospital Problems   No resolved problems to display.         Hospital Course     HPI:Codey Rhodes is a 69 y.o. male with PMH of kidney stones, bladder stone, diabetes, hypertension, hyperlipidemia, vocal cord polyps presented to the hospital on 2024, and was admitted with a principal diagnosis of Hematuria.      Patient reports on Friday began having symptoms of initially difficulty initiating flow with urgency and pain in anterior left side with severe chills and shivers.  On Saturday he reports pain had subsided but was having hot flashes and was diaphoretic.  He reports then began experiencing color change of the urine that appeared dark and blood-tinged.  Had associated dizziness.  Reports has had a chronic right lower quadrant pain.     In the ED was positive for hypokalemia with potassium 3.0.  Acute kidney injury with creatinine 1.45.  Lactate 1.0.  Negative for leukocytosis.  Hemoglobin 12.8.  Urinalysis cloudy trace blood moderate leukocytes and too numerous to count white blood cells.  No bacteria.  Was given Rocephin.  Urology was consulted as CT of the abdomen pelvis showed a large calculus noted in the urinary bladder and although it was decreased in size there was an additional 2 focal calcifications noted in the region of the prostate and presumedly within the urethra.  Patient does report that he is  able to feel the stone moving within the urethra but does not cause pain.  Was verbally reported by ED provider that urology had requested no IV fluids at this time.  Keep patient n.p.o. and will consult in the morning.  Patient is stable at this time.  Will follow urology's recommendation      Hospital Course:    Intermittent hematuria due to bladder stone/ureteral stone  Acute kidney injury  Diabetes mellitus type 2  7 beat run of nonsustained ventricular tachycardia  CAD s/p RCA stenting in 12/23  Hypertension  Hyperlipidemia  Vocal cord polyp    4/18.  Patient was seen by urology and patient underwent cystoscopy and removal of bladder stone.  Patient has ANTHONY so some of the blood pressure medication was put on hold.  Patient's creatinine needs to be monitored as an outpatient..  Patient also had episode of ventricular tachycardia and cardiology saw this patient and recommended Holter monitor.  urology is okay for discharge          Day of Discharge     Vital Signs:  Temp:  [96.8 °F (36 °C)-98.4 °F (36.9 °C)] 97.9 °F (36.6 °C)  Heart Rate:  [67-92] 68  Resp:  [12-22] 16  BP: (130-168)/(65-99) 155/82    Physical Exam:  General Appearance:    Alert, cooperative, in no acute distress   Head:    Normocephalic, without obvious abnormality, atraumatic   Eyes:            conjunctivae and sclerae normal, no   icterus, no pallor, corneas  clear, PERRLA   Neck:   No adenopathy, supple, trachea midline, no thyromegaly, no   carotid bruit, no JVD   Lungs:     Clear to auscultation,respirations regular, even and                  unlabored    Heart:    Regular rhythm and normal rate, normal S1 and S2, no            murmur, no gallop, no rub, no click   Abdomen:     Normal bowel sounds, no masses, no organomegaly, soft        non-tender, non-distended, no guarding, no rebound                No tenderness   Extremities:   Moves all extremities well, no edema, no cyanosis, no             redness   Lymph nodes:   No palpable  adenopathy   Neurologic:   Cranial nerves 2 - 12 grossly intact, sensation intact, DTR       present and equal bilaterally          Pertinent  and/or Most Recent Results     LAB RESULTS:      Lab 04/17/24 0452 04/16/24 1919 04/16/24 1913   WBC 6.98  --  9.32   HEMOGLOBIN 12.3*  --  12.8*   HEMATOCRIT 35.7*  --  37.4*   PLATELETS 204  --  234   NEUTROS ABS 4.90  --  6.78   IMMATURE GRANS (ABS) 0.02  --  0.03   LYMPHS ABS 1.03  --  1.34   MONOS ABS 0.69  --  0.71   EOS ABS 0.33  --  0.43*   MCV 84.0  --  84.4   LACTATE  --  1.0  --          Lab 04/17/24 0452 04/16/24 1913   SODIUM 136 136   POTASSIUM 3.7 3.0*   CHLORIDE 97* 93*   CO2 30.0* 31.0*   ANION GAP 9.0 12.0   BUN 42* 36*   CREATININE 1.66* 1.45*   EGFR 44.1* 51.8*   GLUCOSE 233* 163*   CALCIUM 9.1 9.5   MAGNESIUM  --  2.4         Lab 04/16/24 1913   TOTAL PROTEIN 7.4   ALBUMIN 4.0   GLOBULIN 3.4   ALT (SGPT) 19   AST (SGOT) 18   BILIRUBIN 0.5   ALK PHOS 71   LIPASE 49         Lab 04/17/24 0452   HSTROP T 19                 Brief Urine Lab Results  (Last result in the past 365 days)        Color   Clarity   Blood   Leuk Est   Nitrite   Protein   CREAT   Urine HCG        04/16/24 1936 Yellow   Cloudy   Trace   Moderate (2+)   Negative   Negative                 Microbiology Results (last 10 days)       Procedure Component Value - Date/Time    Blood Culture - Blood, Arm, Left [[id number]]  (Normal) Collected: 04/16/24 2014    Lab Status: Preliminary result Specimen: Blood from Arm, Left Updated: 04/17/24 2030     Blood Culture No growth at 24 hours    Urine Culture - Urine, Urine, Clean Catch [[id number]]  (Normal) Collected: 04/16/24 1936    Lab Status: Preliminary result Specimen: Urine, Clean Catch Updated: 04/17/24 1010     Urine Culture No growth    COVID-19 and FLU A/B PCR, 1 HR TAT - Swab, Nasopharynx [[id number]]  (Normal) Collected: 04/16/24 1914    Lab Status: Final result Specimen: Swab from Nasopharynx Updated: 04/16/24 1939     COVID19 Not  Detected     Influenza A PCR Not Detected     Influenza B PCR Not Detected    Narrative:      Fact sheet for providers: https://www.fda.gov/media/814031/download    Fact sheet for patients: https://www.fda.gov/media/254109/download    Test performed by PCR.    Blood Culture - Blood, Arm, Right [[id number]]  (Normal) Collected: 04/16/24 1913    Lab Status: Preliminary result Specimen: Blood from Arm, Right Updated: 04/17/24 1931     Blood Culture No growth at 24 hours            CT Abdomen Pelvis Without Contrast    Result Date: 4/16/2024  Impression: Impression: A large calculus is noted within the urinary bladder, although this has decreased in size since prior study. Additional 2 focal calcifications are noted within the region of the prostate gland measuring 1.4 and 1.2 cm presumably within the urethra. These  most likely represent fractured stone that are now present within the urethra. Mild circumferential bladder wall thickening, nonspecific but may represent cystitis. Punctate nonobstructing stone in the left lower pole. No hydronephrosis. Electronically Signed: Zane Bernabe DO  4/16/2024 8:56 PM EDT  Workstation ID: [id number]    US Scrotum & Testicles    Result Date: 3/20/2024  Impression: Impression: No acute process nor significant abnormality identified. Electronically Signed: James Carter MD  3/20/2024 4:31 PM EDT  Workstation ID: [id number]    US Pelvis Limited    Result Date: 3/20/2024  Impression: Impression: No acute process nor significant abnormality identified. Electronically Signed: James Carter MD  3/20/2024 4:31 PM EDT  Workstation ID: [id number]    XR Chest 1 View    Result Date: 3/20/2024  Impression: Impression: No acute process identified. Electronically Signed: James Carter MD  3/20/2024 12:41 PM EDT  Workstation ID: [id number]                 Labs Pending at Discharge:  Pending Labs       Order Current Status    STONE ANALYSIS - Calculus, Urinary Bladder In process    Blood Culture -  Blood, Arm, Left Preliminary result    Blood Culture - Blood, Arm, Right Preliminary result    Urine Culture - Urine, Urine, Clean Catch Preliminary result            Procedures Performed  Procedure(s):  CYSTOSCOPY HOLMIUM LASER, LITHOPAXY, BLADDER STONE         Consults:   Consults       Date and Time Order Name Status Description    4/17/2024  1:40 PM Inpatient Cardiology Consult Completed     4/16/2024  9:46 PM Hospitalist (on-call MD unless specified)      4/16/2024  9:34 PM Urology (on-call MD unless specified)                Discharge Details        Discharge Medications        New Medications        Instructions Start Date   cefuroxime 500 MG tablet  Commonly known as: CEFTIN   500 mg, Oral, 2 Times Daily             Continue These Medications        Instructions Start Date   amLODIPine 10 MG tablet  Commonly known as: NORVASC   10 mg, Oral, Daily      aspirin 81 MG EC tablet   81 mg, Oral, Daily      clopidogrel 75 MG tablet  Commonly known as: PLAVIX   75 mg, Oral, Daily      metoprolol succinate XL 50 MG 24 hr tablet  Commonly known as: TOPROL-XL   1 tablet, Oral, Daily      nitroglycerin 0.4 MG SL tablet  Commonly known as: NITROSTAT   1 under the tongue as needed for angina, may repeat q5mins for up three doses      pravastatin 40 MG tablet  Commonly known as: PRAVACHOL   40 mg, Oral, Nightly      terazosin 5 MG capsule  Commonly known as: HYTRIN   5 mg, Oral, Nightly             Stop These Medications      losartan-hydrochlorothiazide 100-25 MG per tablet  Commonly known as: HYZAAR              Allergies   Allergen Reactions    Crestor [Rosuvastatin] Myalgia         Discharge Disposition:   Home or Self Care    Diet:  Hospital:  Diet Order   Procedures    Diet: Regular/House; Fluid Consistency: Thin (IDDSI 0)         Discharge Activity:         CODE STATUS:  Code Status and Medical Interventions:   Ordered at: 04/17/24 0033     Code Status (Patient has no pulse and is not breathing):    CPR (Attempt to  Resuscitate)     Medical Interventions (Patient has pulse or is breathing):    Full Support         No future appointments.    Additional Instructions for the Follow-ups that You Need to Schedule       Discharge Follow-up with PCP   As directed       Currently Documented PCP:    Ta Islas MD    PCP Phone Number:    [phone]     Follow Up Details: PCP in next 2 weeks        Discharge Follow-up with Specialty: Urology per the recommendation   As directed      Specialty: Urology per the recommendation                Time spent on Discharge including face to face service:  >30 minutes    Signature: Electronically signed by Kaitlynn Coleman MD, 04/18/24, 09:55 EDT.  [facility] Hospitalist Team

## 2024-04-18 NOTE — PLAN OF CARE
Goal Outcome Evaluation:              Outcome Evaluation: Patient slept well during the night.  No signs of blood in urine,  Bladder irrigating going slow at this time.  Patient with no pain.  Stable at this time.

## 2024-04-19 NOTE — CASE MANAGEMENT/SOCIAL WORK
Case Management Discharge Note                    Transportation Services  Private: Car    Final Discharge Disposition Code: 01 - home or self-care

## 2024-04-21 LAB
BACTERIA SPEC AEROBE CULT: NORMAL
BACTERIA SPEC AEROBE CULT: NORMAL

## 2024-04-22 NOTE — H&P
[facility] Medicine Services    Hospitalist History and Physical     Codey Rhodes : 1955 MRN:[id number] LOS:0 ROOM:      Reason for admission: Hematuria     Assessment / Plan     Hematuria  Intermittent RLQ pain and LUQ pain   UTI  - CT abdomen and pelvis: A large calculus is noted within the urinary bladder, although this has decreased in size since prior study. Additional 2 focal calcifications are noted within the region of the prostate gland measuring 1.4 and 1.2 cm presumably within the urethra. These   most likely represent fractured stone that are now present within the urethra.Mild circumferential bladder wall thickening, nonspecific but may represent cystitis. Punctate nonobstructing stone in the left lower pole. No hydronephrosis.   - continue Rocephin   - Per verbal report from ED provider urology specifically requested to not provide pt with IVF but keep pt Npo after midnight  - Pt denies need for pain or nausea medication at this time  - hold ASA and plavix for hematuria and pending procedure  - follow urology recommendations    Hypokalemia  - potassium chloride [id number]    Acute kidney injury  -Urology had requested no IV fluids at this time  -Likely secondary to obstructive uropathy  -Will monitor    DM II  - accucheck  - SS insulin    HTN  CAD/s/p stent   - BP stable  - hold medication at this time   - resume as appropriate    HLD  - statin     Vocal cord polyps   - chronic    Nutrition:   No diet orders on file     DVT prophylaxis:  No DVT prophylaxis order currently exists.         History of Present illness     Codey Rhodes is a 69 y.o. male with PMH of kidney stones, bladder stone, diabetes, hypertension, hyperlipidemia, vocal cord polyps presented to the hospital on 2024, and was admitted with a principal diagnosis of Hematuria.     Patient reports on Friday began having symptoms of initially difficulty initiating flow with urgency and pain in anterior left side with  "SATURDAY PREDNISONE- 50-60MG, PERCOCET AND TYLENOL 3625MG    SUNDAY PREDNISONE 60-70, PERCOCET 4450MG    MONDAY 2:30 AM TO 11 AM PRED 60, PERCOCET 2475 MG    SHE NEEDS TO TALK TO YOU REGARDING ALL THE OTHER DETAILS THAT YOU YOU SHOULD HAVE KNOWN ON FRIDAY BEFORE THE MRI  IN CASE SHE NEEDS TO GO TO ER BEFORE NEUROLOGIST OR HAVE EMERGENCY SURGERY.    SHE STILL HAS MRI APPT TOMORROW AND NEEDS TO TALK TO YOU BEFORE.     PS THE NECK WAS \"VERY VERY EXACERBATED YESTERDAY\".     " severe chills and shivers.  On Saturday he reports pain had subsided but was having hot flashes and was diaphoretic.  He reports then began experiencing color change of the urine that appeared dark and blood-tinged.  Had associated dizziness.  Reports has had a chronic right lower quadrant pain.    In the ED was positive for hypokalemia with potassium 3.0.  Acute kidney injury with creatinine 1.45.  Lactate 1.0.  Negative for leukocytosis.  Hemoglobin 12.8.  Urinalysis cloudy trace blood moderate leukocytes and too numerous to count white blood cells.  No bacteria.  Was given Rocephin.  Urology was consulted as CT of the abdomen pelvis showed a large calculus noted in the urinary bladder and although it was decreased in size there was an additional 2 focal calcifications noted in the region of the prostate and presumedly within the urethra.  Patient does report that he is able to feel the stone moving within the urethra but does not cause pain.  Was verbally reported by ED provider that urology had requested no IV fluids at this time.  Keep patient n.p.o. and will consult in the morning.  Patient is stable at this time.  Will follow urology's recommendation    Patient was seen and examined on 04/16/24 at 00:24 EDT .    Subjective / Review of systems     Review of Systems   Constitutional:  Positive for chills.   Gastrointestinal:  Positive for abdominal pain.   Genitourinary:  Positive for hematuria.          Past Medical/Surgical/Social/Family History & Allergies     Past Medical History:   Diagnosis Date    Coronary artery disease involving native coronary artery of native heart 12/8/2016    PCI 12/1/12006 PCI RCA 12/14/16 PCI to LAD 11/2018    Hypertension     Mixed hyperlipidemia 11/19/2019    Type 2 diabetes mellitus without complications 11/19/2019      Past Surgical History:   Procedure Laterality Date    CARDIAC CATHETERIZATION      CARDIAC CATHETERIZATION Right 12/24/2023    Procedure: LEFT HEART CATH with  possible PCI;  Surgeon: Alcira Garcia MD;  Location: [facility] CATH INVASIVE LOCATION;  Service: Cardiovascular;  Laterality: Right;  Local and IV sedation    CORONARY STENT PLACEMENT        Social History     Socioeconomic History    Marital status:    Tobacco Use    Smoking status: Every Day     Current packs/day: 0.00     Types: Cigarettes     Last attempt to quit:      Years since quittin.3    Smokeless tobacco: Never    Tobacco comments:     Patient advised to stop smoking   Vaping Use    Vaping status: Never Used   Substance and Sexual Activity    Alcohol use: Not Currently    Drug use: Never    Sexual activity: Defer      Family History   Problem Relation Age of Onset    Heart disease Mother     COPD Mother     Cancer Mother     No Known Problems Father       Allergies   Allergen Reactions    Crestor [Rosuvastatin] Myalgia      Social Determinants of Health     Tobacco Use: Medium Risk (2024)    Received from [facility]    Patient History     Smoking Tobacco Use: Former     Smokeless Tobacco Use: Never     Passive Exposure: Not on file   Alcohol Use: Not At Risk (2023)    AUDIT-C     Frequency of Alcohol Consumption: Never     Average Number of Drinks: Patient does not drink     Frequency of Binge Drinking: Never   Financial Resource Strain: Not on file   Food Insecurity: Not on file   Transportation Needs: Not on file   Physical Activity: Not on file   Stress: Not on file   Social Connections: Unknown (10/11/2023)    Family and Community Support     Help with Day-to-Day Activities: Not on file     Lonely or Isolated: Not on file   Interpersonal Safety: Not At Risk (2024)    Abuse Screen     Unsafe at Home or Work/School: no     Feels Threatened by Someone?: no     Does Anyone Keep You from Contacting Others or Doint Things Outside the Home?: no     Physical Sign of Abuse Present: no   Depression: Not on file   Housing Stability: Unknown (2023)    Housing  Stability     Current Living Arrangements: home     Potentially Unsafe Housing Conditions: Not on file   Utilities: Not on file   Health Literacy: Unknown (10/11/2023)    Education     Help with school or training?: Not on file     Preferred Language: Not on file   Employment: Unknown (10/11/2023)    Employment     Do you want help finding or keeping work or a job?: Not on file   Disabilities: Not At Risk (12/22/2023)    Disabilities     Concentrating, Remembering, or Making Decisions Difficulty: no     Doing Errands Independently Difficulty: no        Home Medications     Prior to Admission medications    Medication Sig Start Date End Date Taking? Authorizing Provider   amLODIPine (NORVASC) 10 MG tablet Take 1 tablet by mouth Daily. 3/22/23   Kong Brennan MD   aspirin 81 MG EC tablet Take 1 tablet by mouth Daily.    Kong Brennan MD   clopidogrel (PLAVIX) 75 MG tablet Take 1 tablet by mouth Daily. 12/26/23 12/25/24  Young Poon MD   losartan (COZAAR) 100 MG tablet Take 1 tablet by mouth Every Night. 3/6/23   Kong Brennan MD   metoprolol succinate XL (TOPROL-XL) 50 MG 24 hr tablet Take 1 tablet by mouth Daily. 11/25/22   Kong Brennan MD   nitroglycerin (NITROSTAT) 0.4 MG SL tablet 1 under the tongue as needed for angina, may repeat q5mins for up three doses 6/28/21   Pradip Mcneill MD   pravastatin (PRAVACHOL) 40 MG tablet Take 1 tablet by mouth Every Night. 5/18/18   Kong Brennan MD   terazosin (HYTRIN) 5 MG capsule Take 1 capsule by mouth Every Night. 3/17/23   Kong Brennan MD        Objective / Physical Exam     Vital signs:  Temp: 98 °F (36.7 °C)  BP: 114/58  Heart Rate: 78  Resp: 20  SpO2: 100 %  Weight: 108 kg (238 lb 8.6 oz)    Admission Weight: Weight: 108 kg (238 lb 8.6 oz)    Physical Exam  Constitutional:       Appearance: Normal appearance.   Eyes:      Pupils: Pupils are equal, round, and reactive to light.   Cardiovascular:      Rate  and Rhythm: Normal rate and regular rhythm.   Abdominal:      General: There is no distension.      Palpations: Abdomen is soft.      Tenderness: There is no abdominal tenderness.   Musculoskeletal:         General: Normal range of motion.   Skin:     General: Skin is dry.   Neurological:      Mental Status: He is alert and oriented to person, place, and time.   Psychiatric:         Behavior: Behavior normal.            Labs     Results from last 7 days   Lab Units 04/16/24 1913   WBC 10*3/mm3 9.32   HEMOGLOBIN g/dL 12.8*   HEMATOCRIT % 37.4*   PLATELETS 10*3/mm3 234      Results from last 7 days   Lab Units 04/16/24 1913   ALK PHOS U/L 71   AST (SGOT) U/L 18   ALT (SGPT) U/L 19           Results from last 7 days   Lab Units 04/16/24 1913   SODIUM mmol/L 136   POTASSIUM mmol/L 3.0*   CHLORIDE mmol/L 93*   CO2 mmol/L 31.0*   BUN mg/dL 36*   CREATININE mg/dL 1.45*   GLUCOSE mg/dL 163*        Imaging     CT Abdomen Pelvis Without Contrast    Result Date: 4/16/2024  CT ABDOMEN PELVIS WO CONTRAST Date of Exam: 4/16/2024 8:18 PM EDT Indication: flank pain, blood in urine, fever. Comparison: 9/20/2023 Technique: Axial CT images were obtained of the abdomen and pelvis without the administration of contrast. Sagittal and coronal reconstructions were performed.  Automated exposure control and iterative reconstruction methods were used. Findings: Visualized Chest:  The visualized lung bases and lower mediastinal structures are unremarkable. Liver: Liver is normal in size and CT density. No focal lesions. Gallbladder: Status post cholecystectomy Bile Ducts: No billiary dcutal dilation. Spleen: Spleen is normal in size and CT density. Pancreas: Pancreas is normal. There is no evidence of pancreatic mass or peripancreatic fluid. Adrenals: Adrenal glands are unremarkable. Kidneys: Punctate nonobstructing stone in the left lower pole. No obstructing stones or hydronephrosis. Gastrointestinal: Very limited due to lack of IV and  oral contrast. Bladder: A large calculus is noted within the urinary bladder measuring approximately 2.5 x 1.2 cm. Mild circumferential bladder wall thickening Pelvis: 2 focal calcifications are noted in the region of the prostate measuring 1.4 and 1.2 cm, presumably within the urethra. These are concerning for urethral stones. Peritoneum/Mesentery: No fluid collection, ascities, or free air.   Lymph Nodes: No lymphadenopathy. Vasculature: Scattered vascular calcifications of the abdominal aorta. Otherwise unremarkable. Abdominal Wall: Unremarkable Bony Structures: No acute osseous abnormality     Impression: A large calculus is noted within the urinary bladder, although this has decreased in size since prior study. Additional 2 focal calcifications are noted within the region of the prostate gland measuring 1.4 and 1.2 cm presumably within the urethra. These  most likely represent fractured stone that are now present within the urethra. Mild circumferential bladder wall thickening, nonspecific but may represent cystitis. Punctate nonobstructing stone in the left lower pole. No hydronephrosis. Electronically Signed: Zane Bernabe DO  4/16/2024 8:56 PM EDT  Workstation ID: [id number]      No orders to display        Current Medications     Scheduled Meds:  atorvastatin, 10 mg, Oral, Daily  cefTRIAXone, 2,000 mg, Intravenous, Q24H  insulin lispro, 2-7 Units, Subcutaneous, Q6H  potassium chloride, 40 mEq, Oral, Once         Continuous Infusions:          Olga He, [facility] Medicine  04/17/24   00:24 EDT

## 2024-04-24 LAB
COLOR STONE: NORMAL
COM MFR STONE: 10 %
COMPN STONE: NORMAL
LABORATORY COMMENT REPORT: NORMAL
LABORATORY COMMENT REPORT: NORMAL
Lab: NORMAL
Lab: NORMAL
PHOTO: NORMAL
SIZE STONE: NORMAL MM
SPEC SOURCE SUBJ: NORMAL
URATE MFR STONE: 90 %
WT STONE: 1770 MG

## 2024-04-29 LAB
QT INTERVAL: 378 MS
QTC INTERVAL: 396 MS

## 2024-04-30 ENCOUNTER — TELEPHONE (OUTPATIENT)
Dept: CARDIOLOGY | Facility: CLINIC | Age: 69
End: 2024-04-30
Payer: COMMERCIAL

## 2024-04-30 NOTE — TELEPHONE ENCOUNTER
Caller: Codey Rhodes    Relationship to patient: Self    Best call back number: [phone]    Patient is needing: PATIENT REQUESTING TO SEE AMANDA WOODRUFF FOR BLOOD PRESSURE. PATIENT REPORTING LOW /48 . PATIENT MAILED HEART MONITOR IN TODAY. PATIENT REPORTING FATIGUE

## 2024-06-25 ENCOUNTER — TELEPHONE (OUTPATIENT)
Dept: CARDIOLOGY | Facility: CLINIC | Age: 69
End: 2024-06-25

## 2024-06-25 NOTE — TELEPHONE ENCOUNTER
Caller: Codey Rhodes    Relationship to patient: Self    Best call back number: [phone] (home)      Type of visit: FU    If rescheduling, when is the original appointment: 6.25.24     Additional notes:PT CALLED IN TO R/S APPT TODAY 6.25.24, NEXT WALTER IS NOT UNTIL END OF JULY. PLEASE ADVISE WHEN TO R/S PT AND CALL BACK.

## 2024-08-05 ENCOUNTER — OFFICE VISIT (OUTPATIENT)
Dept: CARDIOLOGY | Facility: CLINIC | Age: 69
End: 2024-08-05
Payer: COMMERCIAL

## 2024-08-05 VITALS
WEIGHT: 243.8 LBS | HEART RATE: 85 BPM | BODY MASS INDEX: 36.95 KG/M2 | SYSTOLIC BLOOD PRESSURE: 138 MMHG | RESPIRATION RATE: 20 BRPM | HEIGHT: 68 IN | OXYGEN SATURATION: 97 % | DIASTOLIC BLOOD PRESSURE: 77 MMHG

## 2024-08-05 DIAGNOSIS — I25.10 CORONARY ARTERY DISEASE INVOLVING NATIVE CORONARY ARTERY OF NATIVE HEART WITHOUT ANGINA PECTORIS: Primary | ICD-10-CM

## 2024-08-05 DIAGNOSIS — E78.2 MIXED HYPERLIPIDEMIA: ICD-10-CM

## 2024-08-05 DIAGNOSIS — I10 ESSENTIAL HYPERTENSION: ICD-10-CM

## 2024-08-05 PROCEDURE — 93000 ELECTROCARDIOGRAM COMPLETE: CPT | Performed by: NURSE PRACTITIONER

## 2024-08-05 PROCEDURE — 99213 OFFICE O/P EST LOW 20 MIN: CPT | Performed by: NURSE PRACTITIONER

## 2024-08-05 NOTE — PROGRESS NOTES
Cardiology Office Follow Up Visit      Primary Care Provider:  Ta Islas MD    Reason for f/u:     Coronary artery disease  Nonsustained ventricular tachycardia  Hospital follow-up      Subjective     CC:    Denies chest pain or worsening dyspnea    History of Present Illness       Codey Rhodes is a 69 y.o. male.  Patient has a history of coronary artery disease who presented to [facility] with complaint of fever, chills, body aches associated with left flank pain back in April 2024     Evaluation in the emergency room at that time included CT of his abdomen and pelvis which showed a large calculus noted in the urinary bladder 2 additional focal calcifications are noted within the region of the prostate presumably within the urethra.  Mild circumferential bladder wall thickening a punctate nonobstructing stone left lower pole.  Urology was consulted patient underwent cystolitholopaxy by Dr. Shah.  Cardiology was consulted due to reported ventricular tachycardia    In December 2023 patient underwent cardiac catheterization with PCI and stenting of the distal RCA with drug-eluting stent.  There was moderate disease involving the ramus intermedius branch and mid to distal LAD with plans to conservatively manage with medical therapy.  He had patent stents in the LAD and mid RCA also the ramus intermedius branch of the left circumflex.     Patient was not having symptomatic's.  His losartan with HCTZ was discontinued.  He was discharged home with an M cot monitor in place.Mobile cardiac telemetry demonstrated no atrial fibrillation, no SVT, no ventricular tachycardia.  There was 5% burden of PACs and 2% burden of PVCs.                ASSESSMENT/PLAN:      Diagnoses and all orders for this visit:    1. Coronary artery disease involving native coronary artery of native heart without angina pectoris (Primary)    2. Essential hypertension    3. Mixed hyperlipidemia            MEDICAL DECISION  MAKING:      Patient is not having any symptoms suggestive of unstable angina.  He has had no palpitations or feelings of his heart racing.  No near syncope.  He reports compliance with prescribed medical therapy.  Blood pressure today is stable.    EKG shows sinus rhythm with PACs.    Patient had short episode of nonsustained ventricular tachycardia during recent hospitalization but outpatient M cot did not show any recurrent ventricular tachyarrhythmias.  He is not having any other signs or symptoms of cardiac decompensation.  We have discussed his tobacco abuse and recommended cessation.    We will plan on seeing him back for scheduled follow-up in 6 months if there is any new or worsening problems of asked him to contact the office sooner.      Past Medical History:   Diagnosis Date    Coronary artery disease involving native coronary artery of native heart 12/8/2016    PCI 12/1/12006 PCI RCA 12/14/16 PCI to LAD 11/2018    Hypertension     Mixed hyperlipidemia 11/19/2019    Type 2 diabetes mellitus without complications 11/19/2019       Past Surgical History:   Procedure Laterality Date    CARDIAC CATHETERIZATION      CARDIAC CATHETERIZATION Right 12/24/2023    Procedure: LEFT HEART CATH with possible PCI;  Surgeon: Alcira Garcia MD;  Location: [facility] CATH INVASIVE LOCATION;  Service: Cardiovascular;  Laterality: Right;  Local and IV sedation    CORONARY STENT PLACEMENT      CYSTOSCOPY, URETEROSCOPY, RETROGRADE PYELOGRAM, STENT INSERTION N/A 4/17/2024    Procedure: CYSTOSCOPY HOLMIUM LASER, LITHOPAXY, BLADDER STONE;  Surgeon: Shilo Shah MD;  Location: [facility] OR;  Service: Urology;  Laterality: N/A;         Current Outpatient Medications:     amLODIPine (NORVASC) 10 MG tablet, Take 1 tablet by mouth Daily., Disp: , Rfl:     aspirin 81 MG EC tablet, Take 1 tablet by mouth Daily., Disp: , Rfl:     clopidogrel (PLAVIX) 75 MG tablet, Take 1 tablet by mouth Daily., Disp: 30 tablet, Rfl: 11     "metoprolol succinate XL (TOPROL-XL) 50 MG 24 hr tablet, Take 1 tablet by mouth Daily., Disp: , Rfl:     nitroglycerin (NITROSTAT) 0.4 MG SL tablet, 1 under the tongue as needed for angina, may repeat q5mins for up three doses, Disp: 25 tablet, Rfl: 11    pravastatin (PRAVACHOL) 40 MG tablet, Take 1 tablet by mouth Every Night., Disp: , Rfl:     terazosin (HYTRIN) 5 MG capsule, Take 1 capsule by mouth Every Night., Disp: , Rfl:     Social History     Socioeconomic History    Marital status:    Tobacco Use    Smoking status: Every Day     Current packs/day: 0.00     Types: Cigarettes     Last attempt to quit:      Years since quittin.6    Smokeless tobacco: Never    Tobacco comments:     Patient advised to stop smoking   Vaping Use    Vaping status: Never Used   Substance and Sexual Activity    Alcohol use: Not Currently    Drug use: Never    Sexual activity: Defer       Family History   Problem Relation Age of Onset    Heart disease Mother     COPD Mother     Cancer Mother     No Known Problems Father        The following portions of the patient's history were reviewed and updated as appropriate: allergies, current medications, past family history, past medical history, past social history, past surgical history and problem list.    Review of Systems   Cardiovascular:  Positive for dyspnea on exertion.   Respiratory:  Positive for shortness of breath and snoring.    All other systems reviewed and are negative.      Pertinent items are noted in HPI, all other systems reviewed and negative    /77 (BP Location: Right arm, Patient Position: Sitting, Cuff Size: Large Adult)   Pulse 85   Resp 20   Ht 172.7 cm (68\")   Wt 111 kg (243 lb 12.8 oz)   SpO2 97%   BMI 37.07 kg/m² .  Objective     Vitals reviewed.   Constitutional:       General: Not in acute distress.     Appearance: Normal appearance. Well-developed.   Eyes:      Pupils: Pupils are equal, round, and reactive to light.   HENT:      " Head: Normocephalic and atraumatic.   Neck:      Vascular: No JVD.   Pulmonary:      Effort: Pulmonary effort is normal.      Breath sounds: Normal breath sounds.   Cardiovascular:      Normal rate. Regular rhythm.   Edema:     Peripheral edema absent.   Abdominal:      General: There is no distension.      Palpations: Abdomen is soft.      Tenderness: There is no abdominal tenderness.   Musculoskeletal: Normal range of motion.      Cervical back: Normal range of motion and neck supple. Skin:     General: Skin is warm and dry.   Neurological:      Mental Status: Alert and oriented to person, place, and time.             ECG 12 Lead    Date/Time: 8/5/2024 2:40 PM  Performed by: Bhavna Abdi APRN    Authorized by: Bhavna Abdi APRN  Rhythm: sinus rhythm  Ectopy: atrial premature contractions  Rate: normal  BPM: 85  Conduction: conduction normal  ST Segments: ST segments normal  T Waves: T waves normal  QRS axis: normal  Other: no other findings    Clinical impression: non-specific ECG          EKG ordered by and reviewed by me in office

## 2025-04-10 ENCOUNTER — OFFICE VISIT (OUTPATIENT)
Dept: CARDIOLOGY | Facility: CLINIC | Age: 70
End: 2025-04-10
Payer: MEDICARE

## 2025-04-10 VITALS
DIASTOLIC BLOOD PRESSURE: 83 MMHG | WEIGHT: 245 LBS | SYSTOLIC BLOOD PRESSURE: 164 MMHG | OXYGEN SATURATION: 97 % | BODY MASS INDEX: 37.13 KG/M2 | RESPIRATION RATE: 16 BRPM | HEIGHT: 68 IN | HEART RATE: 77 BPM

## 2025-04-10 DIAGNOSIS — J44.9 CHRONIC OBSTRUCTIVE PULMONARY DISEASE, UNSPECIFIED COPD TYPE: Primary | ICD-10-CM

## 2025-04-10 DIAGNOSIS — R07.89 CHEST PAIN, ATYPICAL: ICD-10-CM

## 2025-04-10 DIAGNOSIS — R06.09 DOE (DYSPNEA ON EXERTION): ICD-10-CM

## 2025-04-10 DIAGNOSIS — I25.10 CORONARY ARTERY DISEASE INVOLVING NATIVE CORONARY ARTERY OF NATIVE HEART WITHOUT ANGINA PECTORIS: ICD-10-CM

## 2025-04-10 RX ORDER — CLOPIDOGREL BISULFATE 75 MG/1
75 TABLET ORAL DAILY
COMMUNITY

## 2025-04-10 RX ORDER — HYDROCHLOROTHIAZIDE 25 MG/1
25 TABLET ORAL DAILY
Qty: 30 TABLET | Refills: 11 | Status: SHIPPED | OUTPATIENT
Start: 2025-04-10

## 2025-04-10 RX ORDER — AMLODIPINE AND VALSARTAN 10; 160 MG/1; MG/1
1 TABLET ORAL DAILY
COMMUNITY

## 2025-04-10 NOTE — PROGRESS NOTES
Cardiology Clinic Note  Zak Cantu MD, PhD    Subjective:     Encounter Date:04/10/2025      Patient ID: Codey Rhodes is a 70 y.o. male.    Chief Complaint:  Chief Complaint   Patient presents with    Follow-up     6 months       HPI:      I had the pleasure to see this very pleasant 67-year-old gentleman previously followed by cardiology with CAD, prior PCI remotely 2006 2016 and 2018, essential hypertension hyperlipidemia and diabetes.  Stress Myoview 2020 revealed no reversible ischemia with preserved EF.  He has had occasional palpitations, underwent preoperative clearance with stress testing which was also unremarkable.  His blood pressures are controlled at home less than 135 systolic with heart rates in the 60s, his weight is down to 244 from 253, he displays no chest pain, unstable angina, EKG demonstrates sinus rhythm with normal conduction at a rate of 60.  No heart failure signs or symptoms no unstable angina no unexplained palpitations or presyncope    Today presents back with some lower extremity edema also some dyspnea on exertion and chest heaviness.Syncope or palpitations.  His primary care recently increased his antihypertensives with amlodipine valsartan.  He is off his thiazide diuretic that had been on historically.  We discussed goals for lipids blood pressure diet exercise, smoking cessation was also addressed but he is not really wish to quit smoking at this time     Review of systems otherwise negative x14 point review of systems except as mentioned above     History reviewed  PCI 2006 to the RCA, 2016 LAD     Historical data copied forward from previous encounters in EMR including the history, exam, and assessment/plan has been reviewed and is unchanged unless noted otherwise.     Cardiac medicines reviewed with risk, benefits, and necessity of each discussed.     Risk and benefit of cardiac testing reviewed including death heart attack stroke pain bleeding infection need for vascular  "/cardiovascular surgery were discussed and the patient      Objective:      Objective  /79 (BP Location: Left arm, Patient Position: Sitting)   Pulse 60   Resp 18   Ht 172.7 cm (68\")   Wt 111 kg (244 lb)   BMI 37.10 kg/m²      Physical Exam  Regular rate and rhythm with no rubs gallops heave or lift  Faint 1 out of 6 murmur benign, unchanged  No clubbing cyanosis, 1+ pitting edema bilaterally  Overweight, BMI greater than 35, soft nontender nondistended  Intact grossly  Wheezes expiratory bilaterally, no rhonchi  Normocephalic/atraumatic pupils equal round  Normal pulses normal cap refill  No carotid bruits or JVD        Assessment  Essential hypertension  Hyperlipidemia  Diabetes is comorbidity  Dyspnea on exertion  Obesity BMI greater than 35  Multivessel CAD, RCA and LAD with interventions 2016 2016 respectively  Secondary prevention goals discussed  Goal LDL is less than 70 with CAD and diabetes  Goal blood pressure really less than 130 systolic, more elevated, recent change in his medications from primary care with amlodipine valsartan combination  Add back thiazide diuretic with lower extremity edema    Lexiscan stress with dyspnea exertion and chest discomfort with known coronary disease  Refer to pulmonary  PFTs ordered       Encourage diet and exercise, weight loss caloric restriction low-cholesterol low-sodium diet     Follow-up results when able  Zak Cantu MD, PhD    Objective:         /83 (BP Location: Right arm, Patient Position: Sitting, Cuff Size: Large Adult)   Pulse 77   Resp 16   Ht 172.7 cm (68\")   Wt 111 kg (245 lb)   SpO2 97%   BMI 37.25 kg/m²               The pleasure to be involved in this patient's cardiovascular care.  Please call with any questions or concerns  Zak Cantu MD, PhD    Most recent EKG as reviewed and interpreted by me:  Procedures     Most recent echo as reviewed and interpreted by me:      Most recent stress test as reviewed and interpreted by " me:  Results for orders placed during the hospital encounter of 12/22/23    Stress Test With Myocardial Perfusion One Day    Interpretation Summary  Indications  Chest pain    This study was performed under the direct supervision and [facility] nurse practitioner..    Resting ECG  Sinus rhythm    The patient was injected with Lexiscan intravenously while constantly monitoring electrocardiogram and vital signs.  Patient did not have any chest discomfort ST abnormalities or ectopy with injection of Lexiscan.    Cardiolite was used as an imaging agent.    Cardiolite images showed mild inferior and apical ischemia.    Gated SPECT images revealed normal left ventricle size and contractility with ejection fraction of 66%.    Impression  ========  Lexiscan Cardiolite test showed mild inferior and apical ischemia.  Gated SPECT images revealed normal left ventricular size and contractility with ejection fraction of 66%.      Most recent cardiac catheterization as reviewed interpreted by me:  Results for orders placed during the hospital encounter of 12/22/23    Cardiac Catheterization/Vascular Study    Conclusion  Table formatting from the original result was not included.  Cardiac Catheterization with PCI Report    Codey Rhodes  [id number]  12/24/2023  @PCP@    He underwent cardiac catheterization and percutaneous coronary intervention.    Indications for the procedure include: acute coronary syndrome.  Patient presented with symptoms of unstable angina and abnormal stress test with refractory recurrent chest pain on maximal medical therapy    Procedure Details:  The risks, benefits, complications, treatment options, and expected outcomes were discussed with the patient. The patient and/or family concurred with the proposed plan, giving informed consent.    After informed consent the patient was brought to the cath lab after appropriate IV hydration was begun and oral premedication was given. He was further sedated with  fentanyl. He was prepped and draped in the usual manner. Using the modified Seldinger access technique, a 6 Telugu sheath was placed in the femoral artery. A left heart catheterization with coronary arteriography was performed. Findings are discussed below.    The decision was made to proceed with intervention. He received Heparin as per protocol. The details of the intervention are as follows:    For the interventional procedure we used a JR4 guide catheter with a BMW guidewire to cross the lesion in the distal portion of the right coronary artery lesion is directly stented with a 3.0 x 15 mm Xience drug-eluting stent that was deployed at 14 jeromy with good angiographic result.  Patient tolerated the procedure very well with no immediate postprocedure complications plan to obtain hemostasis by manual pressure.  Procedural anticoagulation was heparin patient received 600 mg of Plavix at the end of the procedure.    After the procedure was completed, sedation was stopped and the sheaths and catheters were all removed according to established hospital protocols.    Conscious sedation:  Conscious sedation was performed according to protocol.  I supervised and directed an independent trained observer with the assistance of monitoring the patient's level of consciousness and physiologic status throughout the procedure.  Intraoperative service time was 90 minutes.    Findings:    Hemodynamics Aortic pressure systolic 166 diastolic 75 with a mean pressure of 110 mmHg  Left Main Large-caliber vessel angiographically normal bifurcates into left artery descending and left circumflex arteries  RCA Large-caliber vessel dominant vessel  Proximal right coronary artery has angiographic 30 to 40% stenosis  Mid right coronary artery has a patent stent with mild in-stent restenosis angiographically 10 to 20%  Distal right coronary artery has a focal area of angiographic 80 to 90% stenosis likely the culprit vessel for patient's  symptoms of unstable angina  Right coronary artery provides a moderate caliber PDA and PLB branches that are angiographically normal  LAD Large-caliber vessel  LAD has a patent stent in the proximal and midportion  Distal edge of the LAD stent has angiographic 20 to 30% stenosis  Mid to distal LAD has another focal area of angiographic 50% stenosis  First diagonal branch of the LAD is a moderate caliber vessel angiographically normal  Circ Moderate to large caliber vessel  First marginal branch of the left circumflex artery is almost like a ramus intermediate branch has a patent stent in the proximal portion  Proximal to the stent in the ostial portion of the ramus inferior branch there is angiographic 30 to 40% stenosis  Mid ramus intermediate branch after the stented segment has another focal area of angiographic 40% stenosis  Continuation of the left circumflex artery has a mid angiographic 30 to 40% stenosis  LV Not done  Coronary dominance Right coronary artery    Interventions/Vessels Successful PCI and stenting of the distal right coronary artery with placement of a drug-eluting stent  Guides/Wires BMW  Devices Xience drug-eluting stent 3.0 x 15 mm  Post % Stenosis  0  Pre-procedure GEORGINA flow  3  Post procedure GEORGINA flow  3  Closure Device None  Complications None    Estimated Blood Loss:  Minimal    Specimens: None    Complications:  None; patient tolerated the procedure well.    Disposition: PACU - hemodynamically stable.    Condition: stable    Impressions:  Severe single-vessel coronary artery disease involving the distal right coronary artery culprit lesion for the patient's symptoms of unstable angina successfully treated with placement of a Xience drug-eluting stent  Moderate disease involving the ramus intermediate branch and mid to distal LAD plan to conservatively manage with medical therapy  Patent stents in the LAD mid right coronary artery and also ramus intermediate branch of the left circumflex  artery    Recommendations:  Aspirin 81 mg p.o. once a day  Plavix 75 mg p.o. once a day  Observe patient in PCU bed overnight  Test results reviewed and discussed with patient and family    The following portions of the patient's history were reviewed and updated as appropriate: allergies, current medications, past family history, past medical history, past social history, past surgical history, and problem list.      ROS:  14 point review of systems negative except as mentioned above    Current Outpatient Medications:     amLODIPine-valsartan (EXFORGE)  MG per tablet, Take 1 tablet by mouth Daily., Disp: , Rfl:     aspirin 81 MG EC tablet, Take 1 tablet by mouth Daily., Disp: , Rfl:     clopidogrel (PLAVIX) 75 MG tablet, Take 1 tablet by mouth Daily., Disp: , Rfl:     metoprolol succinate XL (TOPROL-XL) 50 MG 24 hr tablet, Take 1 tablet by mouth Daily., Disp: , Rfl:     nitroglycerin (NITROSTAT) 0.4 MG SL tablet, 1 under the tongue as needed for angina, may repeat q5mins for up three doses, Disp: 25 tablet, Rfl: 11    pravastatin (PRAVACHOL) 40 MG tablet, Take 1 tablet by mouth Every Night., Disp: , Rfl:     terazosin (HYTRIN) 5 MG capsule, Take 1 capsule by mouth Every Night., Disp: , Rfl:     Problem List:  Patient Active Problem List   Diagnosis    Coronary artery disease involving native coronary artery of native heart    Mixed hyperlipidemia    Essential hypertension    Type 2 diabetes mellitus without complications    Pre-operative clearance    Vocal cord polyps    S/P coronary artery stent placement    S/P blane    Borderline diabetes    Preop cardiovascular exam    Chest pain    Hematuria     Past Medical History:  Past Medical History:   Diagnosis Date    Coronary artery disease involving native coronary artery of native heart 12/8/2016    PCI 12/1/12006 PCI RCA 12/14/16 PCI to LAD 11/2018    Hypertension     Mixed hyperlipidemia 11/19/2019    Type 2 diabetes mellitus without complications 11/19/2019      Past Surgical History:  Past Surgical History:   Procedure Laterality Date    CARDIAC CATHETERIZATION      CARDIAC CATHETERIZATION Right 2023    Procedure: LEFT HEART CATH with possible PCI;  Surgeon: Alcira Garcia MD;  Location: [facility] CATH INVASIVE LOCATION;  Service: Cardiovascular;  Laterality: Right;  Local and IV sedation    CORONARY STENT PLACEMENT      CYSTOSCOPY, URETEROSCOPY, RETROGRADE PYELOGRAM, STENT INSERTION N/A 2024    Procedure: CYSTOSCOPY HOLMIUM LASER, LITHOPAXY, BLADDER STONE;  Surgeon: Shilo Shah MD;  Location: [facility] MAIN OR;  Service: Urology;  Laterality: N/A;     Social History:  Social History     Socioeconomic History    Marital status:    Tobacco Use    Smoking status: Every Day     Current packs/day: 0.00     Types: Cigarettes     Last attempt to quit:      Years since quittin.2     Passive exposure: Current    Smokeless tobacco: Never    Tobacco comments:     Patient advised to stop smoking   Vaping Use    Vaping status: Never Used   Substance and Sexual Activity    Alcohol use: Not Currently    Drug use: Never    Sexual activity: Defer     Allergies:  Allergies   Allergen Reactions    Crestor [Rosuvastatin] Myalgia     Immunizations:  Immunization History   Administered Date(s) Administered    COVID-19 (PFIZER) Purple Cap Monovalent 2021, 2021, 2021    Covid-19 (Pfizer) Gray Cap Monovalent 2022    Flu Vaccine Quad PF 6-35MO 2021    Fluzone High-Dose 65+yrs 2022    Influenza Split Preservative Free ID [id number]    Influenza, Unspecified 10/22/2021    Pneumococcal Conjugate 20-Valent (PCV20) 2023    Pneumococcal Polysaccharide (PPSV23) 2013, 2020            In-Office Procedure(s):  No orders to display        ASCVD RIsk Score::  The ASCVD Risk score (Mario DK, et al., 2019) failed to calculate for the following reasons:    The valid total cholesterol range is 130 to 320  mg/dL    Imaging:    Results for orders placed during the hospital encounter of 03/20/24    XR Chest 1 View    Narrative  XR CHEST 1 VW    Date of Exam: 3/20/2024 12:30 PM EDT    Indication: wheezing    Comparison: 12/22/2023    Findings:  Cardiomediastinal silhouette is unremarkable. There are calcified hilar granulomata. No airspace disease, pneumothorax, nor pleural effusion. No acute osseous abnormality identified.    Impression  Impression:  No acute process identified.      Electronically Signed: James Carter MD  3/20/2024 12:41 PM EDT  Workstation ID: [id number]       Results for orders placed during the hospital encounter of 04/16/24    CT Abdomen Pelvis Without Contrast    Narrative  CT ABDOMEN PELVIS WO CONTRAST    Date of Exam: 4/16/2024 8:18 PM EDT    Indication: flank pain, blood in urine, fever.    Comparison: 9/20/2023    Technique: Axial CT images were obtained of the abdomen and pelvis without the administration of contrast. Sagittal and coronal reconstructions were performed.  Automated exposure control and iterative reconstruction methods were used.      Findings:  Visualized Chest:  The visualized lung bases and lower mediastinal structures are unremarkable.    Liver: Liver is normal in size and CT density. No focal lesions.    Gallbladder: Status post cholecystectomy    Bile Ducts: No billiary dcutal dilation.    Spleen: Spleen is normal in size and CT density.    Pancreas: Pancreas is normal. There is no evidence of pancreatic mass or peripancreatic fluid.    Adrenals: Adrenal glands are unremarkable.    Kidneys: Punctate nonobstructing stone in the left lower pole. No obstructing stones or hydronephrosis.    Gastrointestinal: Very limited due to lack of IV and oral contrast.    Bladder: A large calculus is noted within the urinary bladder measuring approximately 2.5 x 1.2 cm. Mild circumferential bladder wall thickening    Pelvis: 2 focal calcifications are noted in the region of the prostate  measuring 1.4 and 1.2 cm, presumably within the urethra. These are concerning for urethral stones.    Peritoneum/Mesentery: No fluid collection, ascities, or free air.    Lymph Nodes: No lymphadenopathy.    Vasculature: Scattered vascular calcifications of the abdominal aorta. Otherwise unremarkable.    Abdominal Wall: Unremarkable    Bony Structures: No acute osseous abnormality    Impression  Impression:  A large calculus is noted within the urinary bladder, although this has decreased in size since prior study. Additional 2 focal calcifications are noted within the region of the prostate gland measuring 1.4 and 1.2 cm presumably within the urethra. These  most likely represent fractured stone that are now present within the urethra.    Mild circumferential bladder wall thickening, nonspecific but may represent cystitis.    Punctate nonobstructing stone in the left lower pole. No hydronephrosis.            Electronically Signed: Zane Bernabe DO  4/16/2024 8:56 PM EDT  Workstation ID: [id number]      Results for orders placed during the hospital encounter of 04/16/24    CT Abdomen Pelvis Without Contrast    Narrative  CT ABDOMEN PELVIS WO CONTRAST    Date of Exam: 4/16/2024 8:18 PM EDT    Indication: flank pain, blood in urine, fever.    Comparison: 9/20/2023    Technique: Axial CT images were obtained of the abdomen and pelvis without the administration of contrast. Sagittal and coronal reconstructions were performed.  Automated exposure control and iterative reconstruction methods were used.      Findings:  Visualized Chest:  The visualized lung bases and lower mediastinal structures are unremarkable.    Liver: Liver is normal in size and CT density. No focal lesions.    Gallbladder: Status post cholecystectomy    Bile Ducts: No billiary dcutal dilation.    Spleen: Spleen is normal in size and CT density.    Pancreas: Pancreas is normal. There is no evidence of pancreatic mass or peripancreatic  fluid.    Adrenals: Adrenal glands are unremarkable.    Kidneys: Punctate nonobstructing stone in the left lower pole. No obstructing stones or hydronephrosis.    Gastrointestinal: Very limited due to lack of IV and oral contrast.    Bladder: A large calculus is noted within the urinary bladder measuring approximately 2.5 x 1.2 cm. Mild circumferential bladder wall thickening    Pelvis: 2 focal calcifications are noted in the region of the prostate measuring 1.4 and 1.2 cm, presumably within the urethra. These are concerning for urethral stones.    Peritoneum/Mesentery: No fluid collection, ascities, or free air.    Lymph Nodes: No lymphadenopathy.    Vasculature: Scattered vascular calcifications of the abdominal aorta. Otherwise unremarkable.    Abdominal Wall: Unremarkable    Bony Structures: No acute osseous abnormality    Impression  Impression:  A large calculus is noted within the urinary bladder, although this has decreased in size since prior study. Additional 2 focal calcifications are noted within the region of the prostate gland measuring 1.4 and 1.2 cm presumably within the urethra. These  most likely represent fractured stone that are now present within the urethra.    Mild circumferential bladder wall thickening, nonspecific but may represent cystitis.    Punctate nonobstructing stone in the left lower pole. No hydronephrosis.            Electronically Signed: Zane Bernabe DO  4/16/2024 8:56 PM EDT  Workstation ID: [id number]      Lab Review:   No visits with results within 6 Month(s) from this visit.   Latest known visit with results is:   Admission on 04/16/2024, Discharged on 04/18/2024   Component Date Value    Glucose 04/16/2024 163 (H)     BUN 04/16/2024 36 (H)     Creatinine 04/16/2024 1.45 (H)     Sodium 04/16/2024 136     Potassium 04/16/2024 3.0 (L)     Chloride 04/16/2024 93 (L)     CO2 04/16/2024 31.0 (H)     Calcium 04/16/2024 9.5     Total Protein 04/16/2024 7.4     Albumin  04/16/2024 4.0     ALT (SGPT) 04/16/2024 19     AST (SGOT) 04/16/2024 18     Alkaline Phosphatase 04/16/2024 71     Total Bilirubin 04/16/2024 0.5     Globulin 04/16/2024 3.4     A/G Ratio 04/16/2024 1.2     BUN/Creatinine Ratio 04/16/2024 24.8     Anion Gap 04/16/2024 12.0     eGFR 04/16/2024 51.8 (L)     Lipase 04/16/2024 49     Color, UA 04/16/2024 Yellow     Appearance, UA 04/16/2024 Cloudy (A)     pH, UA 04/16/2024 <=5.0     Specific Gravity, UA 04/16/2024 1.016     Glucose, UA 04/16/2024 Negative     Ketones, UA 04/16/2024 Negative     Bilirubin, UA 04/16/2024 Negative     Blood, UA 04/16/2024 Trace (A)     Protein, UA 04/16/2024 Negative     Leuk Esterase, UA 04/16/2024 Moderate (2+) (A)     Nitrite, UA 04/16/2024 Negative     Urobilinogen, UA 04/16/2024 1.0 E.U./dL     Blood Culture 04/16/2024 No growth at 5 days     Blood Culture 04/16/2024 No growth at 5 days     COVID19 04/16/2024 Not Detected     Influenza A PCR 04/16/2024 Not Detected     Influenza B PCR 04/16/2024 Not Detected     WBC 04/16/2024 9.32     RBC 04/16/2024 4.43     Hemoglobin 04/16/2024 12.8 (L)     Hematocrit 04/16/2024 37.4 (L)     MCV 04/16/2024 84.4     MCH 04/16/2024 28.9     MCHC 04/16/2024 34.2     RDW 04/16/2024 13.2     RDW-SD 04/16/2024 40.7     MPV 04/16/2024 9.8     Platelets 04/16/2024 234     Neutrophil % 04/16/2024 72.8     Lymphocyte % 04/16/2024 14.4 (L)     Monocyte % 04/16/2024 7.6     Eosinophil % 04/16/2024 4.6     Basophil % 04/16/2024 0.3     Immature Grans % 04/16/2024 0.3     Neutrophils, Absolute 04/16/2024 6.78     Lymphocytes, Absolute 04/16/2024 1.34     Monocytes, Absolute 04/16/2024 0.71     Eosinophils, Absolute 04/16/2024 0.43 (H)     Basophils, Absolute 04/16/2024 0.03     Immature Grans, Absolute 04/16/2024 0.03     nRBC 04/16/2024 0.0     Lactate 04/16/2024 1.0     RBC, UA 04/16/2024 0-2     WBC, UA 04/16/2024 Too Numerous to Count (A)     Bacteria, UA 04/16/2024 None Seen     Squamous Epithelial Cell*  04/16/2024 0-2     Hyaline Casts, UA 04/16/2024 3-6     Methodology 04/16/2024 Automated Microscopy     Urine Culture 04/16/2024 No growth     Magnesium 04/16/2024 2.4     Glucose 04/17/2024 166 (H)     Glucose 04/17/2024 193 (H)     WBC 04/17/2024 6.98     RBC 04/17/2024 4.25     Hemoglobin 04/17/2024 12.3 (L)     Hematocrit 04/17/2024 35.7 (L)     MCV 04/17/2024 84.0     MCH 04/17/2024 28.9     MCHC 04/17/2024 34.5     RDW 04/17/2024 13.2     RDW-SD 04/17/2024 40.4     MPV 04/17/2024 9.9     Platelets 04/17/2024 204     Neutrophil % 04/17/2024 70.2     Lymphocyte % 04/17/2024 14.8 (L)     Monocyte % 04/17/2024 9.9     Eosinophil % 04/17/2024 4.7     Basophil % 04/17/2024 0.1     Immature Grans % 04/17/2024 0.3     Neutrophils, Absolute 04/17/2024 4.90     Lymphocytes, Absolute 04/17/2024 1.03     Monocytes, Absolute 04/17/2024 0.69     Eosinophils, Absolute 04/17/2024 0.33     Basophils, Absolute 04/17/2024 0.01     Immature Grans, Absolute 04/17/2024 0.02     nRBC 04/17/2024 0.0     Glucose 04/17/2024 233 (H)     BUN 04/17/2024 42 (H)     Creatinine 04/17/2024 1.66 (H)     Sodium 04/17/2024 136     Potassium 04/17/2024 3.7     Chloride 04/17/2024 97 (L)     CO2 04/17/2024 30.0 (H)     Calcium 04/17/2024 9.1     BUN/Creatinine Ratio 04/17/2024 25.3 (H)     Anion Gap 04/17/2024 9.0     eGFR 04/17/2024 44.1 (L)     Glucose 04/17/2024 300 (H)     Glucose 04/18/2024 229 (H)     Glucose 04/18/2024 349 (H)     QT Interval 04/17/2024 378     QTC Interval 04/17/2024 396     Glucose 04/17/2024 147 (H)     Glucose 04/17/2024 155 (H)     Stone Source 04/17/2024 Comment     Color 04/17/2024 Orange     Size 04/17/2024 6x5     Stone Weight 04/17/2024 1770     Composition 04/17/2024 Comment     Ca Oxalate - Monohydrate* 04/17/2024 10     Uric Acid, Stone 04/17/2024 90     Comment 04/17/2024 Comment     Photo 04/17/2024 Comment     Comments: 04/17/2024 Comment     Please note 04/17/2024 Comment     Disclaimer: 04/17/2024 Comment      Glucose 04/17/2024 309 (H)     HS Troponin T 04/17/2024 19     HS Troponin T 04/18/2024 13     Troponin T Numeric Delta 04/18/2024 -6 (L)     Glucose 04/18/2024 361 (H)     BUN 04/18/2024 26 (H)     Creatinine 04/18/2024 1.21     Sodium 04/18/2024 137     Potassium 04/18/2024 3.3 (L)     Chloride 04/18/2024 96 (L)     CO2 04/18/2024 30.0 (H)     Calcium 04/18/2024 9.4     BUN/Creatinine Ratio 04/18/2024 21.5     Anion Gap 04/18/2024 11.0     eGFR 04/18/2024 64.4     Magnesium 04/18/2024 2.2      Recent labs reviewed and interpreted for clinical significance and application            Level of Care:           Zak Cantu MD  04/10/25  .

## 2025-04-22 ENCOUNTER — HOSPITAL ENCOUNTER (OUTPATIENT)
Dept: NUCLEAR MEDICINE | Facility: HOSPITAL | Age: 70
Discharge: HOME | End: 2025-04-22
Payer: MEDICARE

## 2025-04-22 DIAGNOSIS — R07.89 CHEST PAIN, ATYPICAL: ICD-10-CM

## 2025-04-22 DIAGNOSIS — I25.10 CORONARY ARTERY DISEASE INVOLVING NATIVE CORONARY ARTERY OF NATIVE HEART WITHOUT ANGINA PECTORIS: ICD-10-CM

## 2025-04-22 DIAGNOSIS — R06.09 DOE (DYSPNEA ON EXERTION): ICD-10-CM

## 2025-04-22 LAB
BH CV REST NUCLEAR ISOTOPE DOSE: 11 MCI
BH CV STRESS BP STAGE 1: NORMAL
BH CV STRESS BP STAGE 2: NORMAL
BH CV STRESS BP STAGE 3: NORMAL
BH CV STRESS COMMENTS STAGE 1: NORMAL
BH CV STRESS DOSE REGADENOSON STAGE 1: 0.4
BH CV STRESS DURATION MIN STAGE 1: 1
BH CV STRESS DURATION MIN STAGE 2: 1
BH CV STRESS DURATION MIN STAGE 3: 1
BH CV STRESS DURATION SEC STAGE 2: 0
BH CV STRESS HR STAGE 1: 94
BH CV STRESS HR STAGE 2: 100
BH CV STRESS HR STAGE 3: 97
BH CV STRESS NUCLEAR ISOTOPE DOSE: 33 MCI
BH CV STRESS PROTOCOL 1: NORMAL
BH CV STRESS RECOVERY BP: NORMAL MMHG
BH CV STRESS RECOVERY HR: 88 BPM
BH CV STRESS STAGE 1: 1
BH CV STRESS STAGE 2: 2
BH CV STRESS STAGE 3: 3
MAXIMAL PREDICTED HEART RATE: 149 BPM
PERCENT MAX PREDICTED HR: 67.11 %
SPECT HRT GATED+EF W RNC IV: 73 %
STRESS BASELINE BP: NORMAL MMHG
STRESS BASELINE HR: 70 BPM
STRESS PERCENT HR: 79 %
STRESS POST PEAK BP: NORMAL MMHG
STRESS POST PEAK HR: 100 BPM
STRESS TARGET HR: 127 BPM

## 2025-04-22 PROCEDURE — 93017 CV STRESS TEST TRACING ONLY: CPT

## 2025-04-22 PROCEDURE — 78452 HT MUSCLE IMAGE SPECT MULT: CPT | Performed by: STUDENT IN AN ORGANIZED HEALTH CARE EDUCATION/TRAINING PROGRAM

## 2025-04-22 PROCEDURE — A9502 TC99M TETROFOSMIN: HCPCS | Performed by: INTERNAL MEDICINE

## 2025-04-22 PROCEDURE — 78452 HT MUSCLE IMAGE SPECT MULT: CPT

## 2025-04-22 PROCEDURE — 93018 CV STRESS TEST I&R ONLY: CPT | Performed by: STUDENT IN AN ORGANIZED HEALTH CARE EDUCATION/TRAINING PROGRAM

## 2025-04-22 PROCEDURE — 25010000002 REGADENOSON 0.4 MG/5ML SOLUTION: Performed by: INTERNAL MEDICINE

## 2025-04-22 PROCEDURE — 34310000005 TECHNETIUM TETROFOSMIN KIT: Performed by: INTERNAL MEDICINE

## 2025-04-22 RX ORDER — REGADENOSON 0.08 MG/ML
0.4 INJECTION, SOLUTION INTRAVENOUS
Status: COMPLETED | OUTPATIENT
Start: 2025-04-22 | End: 2025-04-22

## 2025-04-22 RX ADMIN — TETROFOSMIN 1 DOSE: 1.38 INJECTION, POWDER, LYOPHILIZED, FOR SOLUTION INTRAVENOUS at 08:45

## 2025-04-22 RX ADMIN — REGADENOSON 0.4 MG: 0.08 INJECTION, SOLUTION INTRAVENOUS at 09:45

## 2025-04-22 RX ADMIN — TETROFOSMIN 1 DOSE: 1.38 INJECTION, POWDER, LYOPHILIZED, FOR SOLUTION INTRAVENOUS at 09:45

## 2025-06-15 ENCOUNTER — HOSPITAL ENCOUNTER (EMERGENCY)
Facility: HOSPITAL | Age: 70
Discharge: HOME | End: 2025-06-15
Admitting: EMERGENCY MEDICINE
Payer: MEDICARE

## 2025-06-15 ENCOUNTER — APPOINTMENT (OUTPATIENT)
Dept: GENERAL RADIOLOGY | Facility: HOSPITAL | Age: 70
End: 2025-06-15
Payer: MEDICARE

## 2025-06-15 VITALS
TEMPERATURE: 97.7 F | RESPIRATION RATE: 17 BRPM | HEIGHT: 68 IN | OXYGEN SATURATION: 97 % | SYSTOLIC BLOOD PRESSURE: 137 MMHG | DIASTOLIC BLOOD PRESSURE: 74 MMHG | HEART RATE: 69 BPM | BODY MASS INDEX: 36.69 KG/M2 | WEIGHT: 242.06 LBS

## 2025-06-15 DIAGNOSIS — R07.9 CHEST PAIN, UNSPECIFIED TYPE: ICD-10-CM

## 2025-06-15 DIAGNOSIS — E87.6 HYPOKALEMIA: Primary | ICD-10-CM

## 2025-06-15 LAB
ALBUMIN SERPL-MCNC: 4.4 G/DL (ref 3.5–5.2)
ALBUMIN/GLOB SERPL: 1.9 G/DL
ALP SERPL-CCNC: 60 U/L (ref 39–117)
ALT SERPL W P-5'-P-CCNC: 23 U/L (ref 1–41)
ANION GAP SERPL CALCULATED.3IONS-SCNC: 11.8 MMOL/L (ref 5–15)
APTT PPP: 26.1 SECONDS (ref 22.7–35.4)
AST SERPL-CCNC: 20 U/L (ref 1–40)
BASOPHILS # BLD AUTO: 0.04 10*3/MM3 (ref 0–0.2)
BASOPHILS NFR BLD AUTO: 0.4 % (ref 0–1.5)
BILIRUB SERPL-MCNC: 0.5 MG/DL (ref 0–1.2)
BUN SERPL-MCNC: 18 MG/DL (ref 8–23)
BUN/CREAT SERPL: 16.7 (ref 7–25)
CALCIUM SPEC-SCNC: 9.9 MG/DL (ref 8.6–10.5)
CHLORIDE SERPL-SCNC: 99 MMOL/L (ref 98–107)
CO2 SERPL-SCNC: 30.2 MMOL/L (ref 22–29)
CREAT SERPL-MCNC: 1.08 MG/DL (ref 0.76–1.27)
DEPRECATED RDW RBC AUTO: 43.4 FL (ref 37–54)
EGFRCR SERPLBLD CKD-EPI 2021: 73.4 ML/MIN/1.73
EOSINOPHIL # BLD AUTO: 0.38 10*3/MM3 (ref 0–0.4)
EOSINOPHIL NFR BLD AUTO: 4.1 % (ref 0.3–6.2)
ERYTHROCYTE [DISTWIDTH] IN BLOOD BY AUTOMATED COUNT: 14.2 % (ref 12.3–15.4)
GEN 5 1HR TROPONIN T REFLEX: 17 NG/L
GLOBULIN UR ELPH-MCNC: 2.3 GM/DL
GLUCOSE SERPL-MCNC: 246 MG/DL (ref 65–99)
HCT VFR BLD AUTO: 42.1 % (ref 37.5–51)
HGB BLD-MCNC: 14.3 G/DL (ref 13–17.7)
HOLD SPECIMEN: NORMAL
HOLD SPECIMEN: NORMAL
IMM GRANULOCYTES # BLD AUTO: 0.04 10*3/MM3 (ref 0–0.05)
IMM GRANULOCYTES NFR BLD AUTO: 0.4 % (ref 0–0.5)
INR PPP: 1.06 (ref 0.9–1.1)
LYMPHOCYTES # BLD AUTO: 1.58 10*3/MM3 (ref 0.7–3.1)
LYMPHOCYTES NFR BLD AUTO: 17.2 % (ref 19.6–45.3)
MCH RBC QN AUTO: 28.6 PG (ref 26.6–33)
MCHC RBC AUTO-ENTMCNC: 34 G/DL (ref 31.5–35.7)
MCV RBC AUTO: 84.2 FL (ref 79–97)
MONOCYTES # BLD AUTO: 0.49 10*3/MM3 (ref 0.1–0.9)
MONOCYTES NFR BLD AUTO: 5.3 % (ref 5–12)
NEUTROPHILS NFR BLD AUTO: 6.65 10*3/MM3 (ref 1.7–7)
NEUTROPHILS NFR BLD AUTO: 72.6 % (ref 42.7–76)
NRBC BLD AUTO-RTO: 0 /100 WBC (ref 0–0.2)
NT-PROBNP SERPL-MCNC: 78 PG/ML (ref 0–900)
PLATELET # BLD AUTO: 230 10*3/MM3 (ref 140–450)
PMV BLD AUTO: 9.9 FL (ref 6–12)
POTASSIUM SERPL-SCNC: 3.4 MMOL/L (ref 3.5–5.2)
PROT SERPL-MCNC: 6.7 G/DL (ref 6–8.5)
PROTHROMBIN TIME: 13.7 SECONDS (ref 11.7–14.2)
RBC # BLD AUTO: 5 10*6/MM3 (ref 4.14–5.8)
SODIUM SERPL-SCNC: 141 MMOL/L (ref 136–145)
TROPONIN T NUMERIC DELTA: -3 NG/L
TROPONIN T SERPL HS-MCNC: 20 NG/L
WBC NRBC COR # BLD AUTO: 9.18 10*3/MM3 (ref 3.4–10.8)
WHOLE BLOOD HOLD COAG: NORMAL
WHOLE BLOOD HOLD SPECIMEN: NORMAL

## 2025-06-15 PROCEDURE — 85025 COMPLETE CBC W/AUTO DIFF WBC: CPT

## 2025-06-15 PROCEDURE — 36415 COLL VENOUS BLD VENIPUNCTURE: CPT

## 2025-06-15 PROCEDURE — 83880 ASSAY OF NATRIURETIC PEPTIDE: CPT

## 2025-06-15 PROCEDURE — 71045 X-RAY EXAM CHEST 1 VIEW: CPT

## 2025-06-15 PROCEDURE — 93005 ELECTROCARDIOGRAM TRACING: CPT

## 2025-06-15 PROCEDURE — 84484 ASSAY OF TROPONIN QUANT: CPT

## 2025-06-15 PROCEDURE — 85730 THROMBOPLASTIN TIME PARTIAL: CPT

## 2025-06-15 PROCEDURE — 80053 COMPREHEN METABOLIC PANEL: CPT

## 2025-06-15 PROCEDURE — 99284 EMERGENCY DEPT VISIT MOD MDM: CPT

## 2025-06-15 PROCEDURE — 85610 PROTHROMBIN TIME: CPT

## 2025-06-15 RX ORDER — POTASSIUM CHLORIDE 1500 MG/1
40 TABLET, EXTENDED RELEASE ORAL ONCE
Status: COMPLETED | OUTPATIENT
Start: 2025-06-15 | End: 2025-06-15

## 2025-06-15 RX ORDER — ASPIRIN 81 MG/1
324 TABLET, CHEWABLE ORAL ONCE
Status: COMPLETED | OUTPATIENT
Start: 2025-06-15 | End: 2025-06-15

## 2025-06-15 RX ORDER — SODIUM CHLORIDE 0.9 % (FLUSH) 0.9 %
10 SYRINGE (ML) INJECTION AS NEEDED
Status: DISCONTINUED | OUTPATIENT
Start: 2025-06-15 | End: 2025-06-15 | Stop reason: HOSPADM

## 2025-06-15 RX ADMIN — ASPIRIN 81 MG CHEWABLE TABLET 324 MG: 81 TABLET CHEWABLE at 07:37

## 2025-06-15 RX ADMIN — POTASSIUM CHLORIDE 40 MEQ: 1500 TABLET, EXTENDED RELEASE ORAL at 08:59

## 2025-06-15 NOTE — DISCHARGE INSTRUCTIONS
Rest.  Drink plenty of fluids.  Take home medications as prescribed.  Follow-up with your cardiologist, call tomorrow to schedule appointment.  Follow-up with primary care, call tomorrow to schedule appointment.  Return to the ER for any new or worsening symptoms.

## 2025-06-15 NOTE — ED PROVIDER NOTES
Subjective   History of Present Illness  Chief complaint: Chest tightness, dyspnea, dizziness and left arm pain that started yesterday      Context: Patient is a 70-year-old male with a history of CAD, type 2 diabetes, hypertension and hyperlipidemia who presents to the ER with complaint of chest pressure, left arm pain, shortness of air and dizziness that started yesterday while he was at work.  Patient reports he was outside working and he started having the pain in his left elbow that shot to his left forearm and then became short of air and dizzy.  Patient reports that he started having some intermittent chest pressure, with no aggravating or alleviating factors.  Patient states he took 3 nitros at home with some improvement in the chest pressure.  Patient denies any fever, abdominal pain, nausea/vomiting/diarrhea.      PCP: Ta Islas  Cardiologist: Pepe              Review of Systems   Constitutional:  Negative for fever.       Past Medical History:   Diagnosis Date    Coronary artery disease involving native coronary artery of native heart 12/8/2016    PCI 12/1/12006 PCI RCA 12/14/16 PCI to LAD 11/2018    Hypertension     Mixed hyperlipidemia 11/19/2019    Type 2 diabetes mellitus without complications 11/19/2019       Allergies   Allergen Reactions    Crestor [Rosuvastatin] Myalgia       Past Surgical History:   Procedure Laterality Date    CARDIAC CATHETERIZATION      CARDIAC CATHETERIZATION Right 12/24/2023    Procedure: LEFT HEART CATH with possible PCI;  Surgeon: Alcira Garcia MD;  Location: [facility] CATH INVASIVE LOCATION;  Service: Cardiovascular;  Laterality: Right;  Local and IV sedation    CORONARY STENT PLACEMENT      CYSTOSCOPY, URETEROSCOPY, RETROGRADE PYELOGRAM, STENT INSERTION N/A 4/17/2024    Procedure: CYSTOSCOPY HOLMIUM LASER, LITHOPAXY, BLADDER STONE;  Surgeon: Shilo Shah MD;  Location: [facility] MAIN OR;  Service: Urology;  Laterality: N/A;       Family History   Problem  Relation Age of Onset    Heart disease Mother     COPD Mother     Cancer Mother     No Known Problems Father        Social History     Socioeconomic History    Marital status:    Tobacco Use    Smoking status: Every Day     Current packs/day: 0.00     Types: Cigarettes     Last attempt to quit: 2005     Years since quittin.4     Passive exposure: Current    Smokeless tobacco: Never    Tobacco comments:     Patient advised to stop smoking   Vaping Use    Vaping status: Never Used   Substance and Sexual Activity    Alcohol use: Not Currently    Drug use: Never    Sexual activity: Defer           Objective   Physical Exam  Vitals and nursing note reviewed.   Constitutional:       Appearance: He is well-developed.   HENT:      Head: Normocephalic.   Eyes:      Extraocular Movements: Extraocular movements intact.   Neck:      Vascular: No hepatojugular reflux or JVD.   Cardiovascular:      Rate and Rhythm: Normal rate and regular rhythm.      Heart sounds: Normal heart sounds.   Pulmonary:      Effort: Pulmonary effort is normal.      Breath sounds: Examination of the right-lower field reveals wheezing. Wheezing present. No decreased breath sounds.   Chest:      Chest wall: No mass, tenderness or crepitus.   Abdominal:      Palpations: Abdomen is soft. There is no hepatomegaly or splenomegaly.      Tenderness: There is no abdominal tenderness.   Musculoskeletal:         General: Normal range of motion.      Cervical back: Normal range of motion.      Right lower leg: No edema.      Left lower leg: No edema.   Skin:     General: Skin is warm and dry.      Capillary Refill: Capillary refill takes less than 2 seconds.   Neurological:      General: No focal deficit present.      Mental Status: He is alert and oriented to person, place, and time.   Psychiatric:         Mood and Affect: Mood normal.         Behavior: Behavior normal.         Procedures           ED Course             /74   Pulse 69   Temp  "97.7 °F (36.5 °C) (Tympanic)   Resp 17   Ht 172.7 cm (68\")   Wt 110 kg (242 lb 1 oz)   SpO2 97%   BMI 36.81 kg/m²   Labs Reviewed   COMPREHENSIVE METABOLIC PANEL - Abnormal; Notable for the following components:       Result Value    Glucose 246 (*)     Potassium 3.4 (*)     CO2 30.2 (*)     All other components within normal limits    Narrative:     GFR Categories in Chronic Kidney Disease (CKD)              GFR Category          GFR (mL/min/1.73)    Interpretation  G1                    90 or greater        Normal or high (1)  G2                    60-89                Mild decrease (1)  G3a                   45-59                Mild to moderate decrease  G3b                   30-44                Moderate to severe decrease  G4                    15-29                Severe decrease  G5                    14 or less           Kidney failure    (1)In the absence of evidence of kidney disease, neither GFR category G1 or G2 fulfill the criteria for CKD.    eGFR calculation 2021 CKD-EPI creatinine equation, which does not include race as a factor   CBC WITH AUTO DIFFERENTIAL - Abnormal; Notable for the following components:    Lymphocyte % 17.2 (*)     All other components within normal limits   BNP (IN-HOUSE) - Normal    Narrative:     This assay is used as an aid in the diagnosis of individuals suspected of having heart failure. It can be used as an aid in the diagnosis of acute decompensated heart failure (ADHF) in patients presenting with signs and symptoms of ADHF to the emergency department (ED). In addition, NT-proBNP of <300 pg/mL indicates ADHF is not likely.    Age Range Result Interpretation  NT-proBNP Concentration (pg/mL:      <50             Positive            >450                   Gray                 300-450                    Negative             <300    50-75           Positive            >900                  Gray                300-900                  Negative            <300      >75     "         Positive            >1800                  Gray                300-1800                  Negative            <300   TROPONIN - Normal    Narrative:     High Sensitive Troponin T Reference Range:  <14.0 ng/L- Negative Female for AMI  <22.0 ng/L- Negative Male for AMI  >=14 - Abnormal Female indicating possible myocardial injury.  >=22 - Abnormal Male indicating possible myocardial injury.   Clinicians would have to utilize clinical acumen, EKG, Troponin, and serial changes to determine if it is an Acute Myocardial Infarction or myocardial injury due to an underlying chronic condition.        PROTIME-INR - Normal   APTT - Normal   HIGH SENSITIVITIY TROPONIN T 1HR - Normal    Narrative:     High Sensitive Troponin T Reference Range:  <14.0 ng/L- Negative Female for AMI  <22.0 ng/L- Negative Male for AMI  >=14 - Abnormal Female indicating possible myocardial injury.  >=22 - Abnormal Male indicating possible myocardial injury.   Clinicians would have to utilize clinical acumen, EKG, Troponin, and serial changes to determine if it is an Acute Myocardial Infarction or myocardial injury due to an underlying chronic condition.        RAINBOW DRAW    Narrative:     The following orders were created for panel order [facility] Draw.  Procedure                               Abnormality         Status                     ---------                               -----------         ------                     Green Top (Gel)[[id number]]                                  Final result               Lavender Top[[id number]]                                     Final result               Gold Top - SST[[id number]]                                   Final result               Light Blue Top[[id number]]                                   Final result                 Please view results for these tests on the individual orders.   GREEN TOP   LAVENDER TOP   GOLD TOP - SST   LIGHT BLUE TOP   CBC AND DIFFERENTIAL    Narrative:     The following  orders were created for panel order CBC & Differential.  Procedure                               Abnormality         Status                     ---------                               -----------         ------                     CBC Auto Differential[[id number]]        Abnormal            Final result                 Please view results for these tests on the individual orders.     Medications   sodium chloride 0.9 % flush 10 mL (has no administration in time range)   aspirin chewable tablet 324 mg (324 mg Oral Given 6/15/25 0737)   potassium chloride (KLOR-CON M20) CR tablet 40 mEq (40 mEq Oral Given 6/15/25 0859)     XR Chest 1 View  Result Date: 6/15/2025  Impression: No acute cardiopulmonary process. Electronically Signed: Norma Curran MD  6/15/2025 7:32 AM EDT  Workstation ID: [id number]                                              Medical Decision Making  Chart review:  Stress dated 04/22/25  Myocardial perfusion imaging indicates a normal myocardial perfusion study with no evidence of ischemia. Impressions are consistent with a low risk study.  ·  Left ventricular ejection fraction is hyperdynamic (Calculated EF > 70%).  ·  Findings consistent with a normal ECG stress test.      Radiology interpretation:  X-rays chest reviewed and interpreted by Sweta: No acute cardiopulmonary process.  Further interpretation by radiologist as above    Lab interpretation:  Labs all viewed by me and significant for: White count 9.18, hemoglobin 14.3, BUN 18, creatinine 1.08, potassium 3.4, ALT 23, AST 20, BNP 78, troponin 20, 1 hour troponin 17.    EKG viewed and interpreted by Dr. Swanson: Sinus rhythm rate 62, QTc 397, no acute ST elevation noted.  comparison: Dated 4/17/2024.  Sinus rhythm, rate 66, QTc 496.    IV established and labs and scans were obtained to evaluate for infection, electrolyte abnormalities, anemia, dehydration, UTI, MI, bronchitis, pneumonia, this is not an all-inclusive list.  Patient is a 70-year-old  male with a history of CAD, type 2 diabetes, hypertension and hyperlipidemia who presents to the ER for above complaint.  On initial examination patient was resting comfortably in the bed, nontoxic in appearance with no signs and symptoms of distress.  Physical exam revealed slight wheeze to right lower lobe on auscultation, no tenderness to chest on palpation, no tenderness to abdomen on palpation no swelling in bilateral lower extremities noted.  Patient was given 324 of aspirin.  Patient reports he did take 3 nitros at home with some relief.  Patient had previous stress test on 4/22/2025 with findings that were consistent of a normal EKG stress test as listed above.  Both troponins were within normal limits, BNP was also within normal limits.  Discussed patient and findings with Dr. Sol, who agreed patient could be discharged home with follow-up with cardiology or could be brought into the OBS unit overnight with cardiology consult.  Patient's labs were indicative of hypokalemia and patient was given p.o. potassium.  On reexamination patient was resting comfortably in the bed, nontoxic in appearance with no signs and symptoms of distress stating pain was improved.  Discussed findings with patient discussed options of placing patient in the OBS unit overnight with a cardiology consult or having patient discharged home with a close follow-up with cardiologist.  Discussed that patient's troponin levels were within the normal limits, EKG was not indicative of acute MI, and review shared decision making patient opted to be discharged home with a follow-up with his cardiologist.  Advised patient to rest, drink plenty of fluids, take all medications as prescribed.  Advised him to follow-up with his primary care provider and to call tomorrow to schedule appointment and his cardiologist and to call tomorrow to schedule appointment.  Advised patient to return to the ER if he had any new or worsening symptoms.  Patient  verbalized understanding of all discharge instructions.    Appropriate PPE worn during exam.  Discussed care with: Sweta    i discussed findings with patient who voices understanding of discharge instructions, signs and symptoms requiring return to ED; discharged improved and in stable condition with follow up for re-evaluation.  This document is intended for medical expert use only. Reading of this document by patients and/or patient's family without participating medical staff guidance may result in misinterpretation and unintended morbidity.  Any interpretation of such data is the responsibility of the patient and/or family member responsible for the patient in concert with their primary or specialist providers, not to be left for sources of online searches such as [employer], [employer] or similar queries. Relying on these approaches to knowledge may result in misinterpretation, misguided goals of care and even death should patients or family members try recommendations outside of the realm of professional medical care in a supervised inpatient environment.         Problems Addressed:  Chest pain, unspecified type: complicated acute illness or injury  Hypokalemia: complicated acute illness or injury    Amount and/or Complexity of Data Reviewed  Labs: ordered.  Radiology: ordered.    Risk  OTC drugs.  Prescription drug management.        Final diagnoses:   Hypokalemia   Chest pain, unspecified type       ED Disposition  ED Disposition       ED Disposition   Discharge    Condition   Stable    Comment   --               Zak Cantu MD  [address] [place] [address] [phone]    Schedule an appointment as soon as possible for a visit   Call tomorrow to schedule an appointment for follow-up.    Ta Islas MD  [address] [facility] [place] [address] [phone]    Schedule an appointment as soon as possible for a visit   Call tomorrow to schedule an appointment for  follow-up.         Medication List      No changes were made to your prescriptions during this visit.            [place] Mena, KEVIN  06/15/25 1026

## 2025-06-16 LAB
QT INTERVAL: 389 MS
QTC INTERVAL: 397 MS

## 2025-08-28 ENCOUNTER — OFFICE VISIT (OUTPATIENT)
Dept: PULMONOLOGY | Facility: HOSPITAL | Age: 70
End: 2025-08-28
Payer: MEDICARE

## 2025-08-28 VITALS
SYSTOLIC BLOOD PRESSURE: 136 MMHG | DIASTOLIC BLOOD PRESSURE: 70 MMHG | HEIGHT: 68 IN | BODY MASS INDEX: 36.53 KG/M2 | HEART RATE: 73 BPM | WEIGHT: 241 LBS | RESPIRATION RATE: 18 BRPM | OXYGEN SATURATION: 100 %

## 2025-08-28 DIAGNOSIS — J44.9 CHRONIC OBSTRUCTIVE PULMONARY DISEASE, UNSPECIFIED COPD TYPE: Primary | ICD-10-CM

## 2025-08-28 PROCEDURE — G0463 HOSPITAL OUTPT CLINIC VISIT: HCPCS

## 2025-08-28 RX ORDER — ALBUTEROL SULFATE 90 UG/1
2 INHALANT RESPIRATORY (INHALATION)
COMMUNITY
Start: 2025-05-29

## 2025-08-28 RX ORDER — ALBUTEROL SULFATE 0.83 MG/ML
SOLUTION RESPIRATORY (INHALATION)
COMMUNITY

## (undated) DEVICE — ST ACC MICROPUNCTURE STFF/CANN PLAT/TP 4F 21G 40CM

## (undated) DEVICE — CATH DIAG IMPULSE FL4 6F 100CM

## (undated) DEVICE — PK TRY HEART CATH 50

## (undated) DEVICE — KT SURG TURNOVER 050

## (undated) DEVICE — CATH DIAG IMPULSE PIG .056 6F 110CM

## (undated) DEVICE — PREP SPRY PVPI 10P 2OZ

## (undated) DEVICE — GW PTFE EMERALD HEPCOAT FC J TIP STD .035 3MM 150CM

## (undated) DEVICE — CVR PROB ULTRASND CIVFLEX GEN/PURP TELESCP/FOLD 5.5X96IN LF

## (undated) DEVICE — CATH FOL SIMPLASTIC 3WY 22F 30CC

## (undated) DEVICE — CATH DIAG IMPULSE FR4 6F 100CM

## (undated) DEVICE — PINNACLE INTRODUCER SHEATH: Brand: PINNACLE

## (undated) DEVICE — Device

## (undated) DEVICE — CATH DIAG IMPULSE FL5 6F 100CM

## (undated) DEVICE — BAG,DRAINAGE,4L,A/R TOWER,LL,SLIDE TAP: Brand: MEDLINE

## (undated) DEVICE — STPCK 3/WY HP M/RA W/OFF/HNDL 1050PSI STRL

## (undated) DEVICE — SOL IRRG H2O BG 3000ML STRL

## (undated) DEVICE — DEV INFL COMPAK W/ACCESSPLUS IN4530

## (undated) DEVICE — TUBING, SUCTION, 1/4" X 12', STRAIGHT: Brand: MEDLINE

## (undated) DEVICE — HI-TORQUE BALANCE MIDDLEWEIGHT UNIVERSAL II GUIDE WIRE J TIP PAK 190 CM: Brand: HI-TORQUE BALANCE MIDDLEWEIGHT UNIVERSAL II

## (undated) DEVICE — GUIDE CATHETER: Brand: MACH1™

## (undated) DEVICE — SOLUTION,WATER,IRRIGATION,1000ML,STERILE: Brand: MEDLINE

## (undated) DEVICE — GLV SURG SIGNATURE ESSENTIAL PF LTX SZ7

## (undated) DEVICE — PK CYSTO 50